# Patient Record
Sex: MALE | Race: WHITE | NOT HISPANIC OR LATINO | Employment: OTHER | URBAN - METROPOLITAN AREA
[De-identification: names, ages, dates, MRNs, and addresses within clinical notes are randomized per-mention and may not be internally consistent; named-entity substitution may affect disease eponyms.]

---

## 2020-06-19 ENCOUNTER — TELEPHONE (OUTPATIENT)
Dept: UROLOGY | Facility: MEDICAL CENTER | Age: 85
End: 2020-06-19

## 2020-07-17 ENCOUNTER — TELEPHONE (OUTPATIENT)
Dept: UROLOGY | Facility: AMBULATORY SURGERY CENTER | Age: 85
End: 2020-07-17

## 2020-07-17 NOTE — TELEPHONE ENCOUNTER
Called pt to confirm appointment/covid screening  Phone rang and then went silent  Unable to leave message

## 2020-07-20 ENCOUNTER — OFFICE VISIT (OUTPATIENT)
Dept: UROLOGY | Facility: HOSPITAL | Age: 85
End: 2020-07-20
Payer: COMMERCIAL

## 2020-07-20 ENCOUNTER — TELEPHONE (OUTPATIENT)
Dept: UROLOGY | Facility: HOSPITAL | Age: 85
End: 2020-07-20

## 2020-07-20 VITALS
HEIGHT: 69 IN | SYSTOLIC BLOOD PRESSURE: 132 MMHG | BODY MASS INDEX: 26.07 KG/M2 | WEIGHT: 176 LBS | DIASTOLIC BLOOD PRESSURE: 72 MMHG | TEMPERATURE: 97 F | HEART RATE: 59 BPM

## 2020-07-20 DIAGNOSIS — R31.0 GROSS HEMATURIA: Primary | ICD-10-CM

## 2020-07-20 PROCEDURE — 99204 OFFICE O/P NEW MOD 45 MIN: CPT | Performed by: UROLOGY

## 2020-07-20 NOTE — PROGRESS NOTES
Assessment/Plan:    No problem-specific Assessment & Plan notes found for this encounter  Diagnoses and all orders for this visit:    Gross hematuria  -     CT abdomen pelvis w wo contrast; Future    Other orders  -     betamethasone valerate (VALISONE) 0 1 % cream; Apply topically 2 (two) times a day  -     Multiple Vitamins-Minerals (ONE DAILY MULTIVITAMIN MEN PO); Take by mouth  -     Cholecalciferol (VITAMIN D3) 50 MCG (2000 UT) CHEW; Chew  -     Benzocaine (ORAL GEL ANESTHETIC MT); Apply to the mouth or throat  -     Carboxymeth-Glyc-Polysorb PF (Refresh Optive Akbar-3) 0 5-1-0 5 % SOLN; Apply to eye  -     hydrocortisone 2 5 % cream; Apply topically 4 (four) times a day as needed          Total visit time was 60 minutes of which over 50% was spent on counseling  Subjective:     Patient ID: Beena Willson is a 81 y o  male    81-year-old male presents for evaluation of gross hematuria  The patient states he had 2 episodes of painless gross hematuria  He states the last episode was approximately 3 weeks ago  He has not had any more hematuria since  He states that the episodes last between 1 and 3 days  He denies any clots  He states the beginning of the stream would be completely blood and then it would lighten towards the end  He may have had some mild dysuria but denies any significant discomfort or pain  He denies any history of smoking  He does have a family history of bladder cancer in his father who was also nonsmoker  He has no other complaints  The following portions of the patient's history were reviewed and updated as appropriate: allergies, current medications, past family history, past medical history, past social history, past surgical history and problem list     Review of Systems   Constitutional: Negative  HENT: Negative  Eyes: Negative  Respiratory: Negative  Cardiovascular: Negative  Gastrointestinal: Negative  Endocrine: Negative      Genitourinary: As noted per HPI   Musculoskeletal: Negative  Skin: Negative  Allergic/Immunologic: Negative  Neurological: Negative  Hematological: Negative  Psychiatric/Behavioral: Negative  AUA SYMPTOM SCORE      Most Recent Value   AUA SYMPTOM SCORE   How often have you had a sensation of not emptying your bladder completely after you finished urinating? 1   How often have you had to urinate again less than two hours after you finished urinating? 2   How often have you found you stopped and started again several times when you urinate? 2   How often have you found it difficult to postpone urination? 1   How often have you had a weak urinary stream?  0   How often have you had to push or strain to begin urination? 0   How many times did you most typically get up to urinate from the time you went to bed at night until the time you got up in the morning? 5   Quality of Life: If you were to spend the rest of your life with your urinary condition just the way it is now, how would you feel about that?  0   AUA SYMPTOM SCORE  11              Objective:    Physical Exam   Constitutional: He is oriented to person, place, and time  He appears well-developed and well-nourished  Neck: Normal range of motion  Cardiovascular: Intact distal pulses  Pulmonary/Chest: Effort normal    Abdominal: Soft  Bowel sounds are normal  He exhibits no distension and no mass  There is no tenderness  There is no rebound and no guarding  Genitourinary: Rectum normal    Genitourinary Comments: Prostate 50 g, smooth, no nodules, nontender  Musculoskeletal: Normal range of motion  Neurological: He is alert and oriented to person, place, and time  Skin: Skin is warm and dry  Psychiatric: He has a normal mood and affect  Vitals reviewed          Results  No results found for: PSA  No results found for: GLUCOSE, CALCIUM, NA, K, CO2, CL, BUN, CREATININE  No results found for: WBC, HGB, HCT, MCV, PLT    No results found for this or any previous visit (from the past 1 hour(s)) ]

## 2020-07-21 ENCOUNTER — APPOINTMENT (OUTPATIENT)
Dept: LAB | Facility: HOSPITAL | Age: 85
End: 2020-07-21
Payer: COMMERCIAL

## 2020-07-21 DIAGNOSIS — R31.0 GROSS HEMATURIA: ICD-10-CM

## 2020-07-21 LAB
ANION GAP SERPL CALCULATED.3IONS-SCNC: 6 MMOL/L (ref 4–13)
BUN SERPL-MCNC: 20 MG/DL (ref 5–25)
CALCIUM SERPL-MCNC: 9.5 MG/DL (ref 8.3–10.1)
CHLORIDE SERPL-SCNC: 103 MMOL/L (ref 100–108)
CO2 SERPL-SCNC: 30 MMOL/L (ref 21–32)
CREAT SERPL-MCNC: 1.07 MG/DL (ref 0.6–1.3)
GFR SERPL CREATININE-BSD FRML MDRD: 63 ML/MIN/1.73SQ M
GLUCOSE P FAST SERPL-MCNC: 95 MG/DL (ref 65–99)
POTASSIUM SERPL-SCNC: 4.2 MMOL/L (ref 3.5–5.3)
SODIUM SERPL-SCNC: 139 MMOL/L (ref 136–145)

## 2020-07-21 PROCEDURE — 36415 COLL VENOUS BLD VENIPUNCTURE: CPT

## 2020-07-21 PROCEDURE — 80048 BASIC METABOLIC PNL TOTAL CA: CPT

## 2020-07-24 ENCOUNTER — HOSPITAL ENCOUNTER (OUTPATIENT)
Dept: CT IMAGING | Facility: HOSPITAL | Age: 85
Discharge: HOME | End: 2020-07-24
Attending: UROLOGY
Payer: COMMERCIAL

## 2020-07-24 DIAGNOSIS — R31.0 GROSS HEMATURIA: ICD-10-CM

## 2020-07-24 PROCEDURE — 74178 CT ABD&PLV WO CNTR FLWD CNTR: CPT

## 2020-07-24 RX ADMIN — IOHEXOL 100 ML: 350 INJECTION, SOLUTION INTRAVENOUS at 13:56

## 2020-07-27 ENCOUNTER — TELEPHONE (OUTPATIENT)
Dept: UROLOGY | Facility: MEDICAL CENTER | Age: 85
End: 2020-07-27

## 2020-07-27 NOTE — TELEPHONE ENCOUNTER
Patient returned call, patient confirmed appt for 7/28/2020 at 10:00am  Also, patient is inquiring CT results    Patient can be reached at [phone]

## 2020-07-27 NOTE — TELEPHONE ENCOUNTER
Patient of Dr Michelle Harrison seen in [address] [place] [address] office  Radiology called to advise cat scan results are abnormal please review epic

## 2020-07-27 NOTE — TELEPHONE ENCOUNTER
Patient of Benson/Shashank  History of gross hematuria  Last seen 7/20/2020, ordered for CT and cysto (which is scheduled 8/28/2020)  Will route to provider to review CT

## 2020-07-27 NOTE — TELEPHONE ENCOUNTER
Call placed to patient, son Harish Mora answered  I did speak with him per communication consent form  Advised that patient had called for results, advised that they would be discussed at length during appointment tomorrow  Son verbalized understanding and agrees with plan

## 2020-07-27 NOTE — TELEPHONE ENCOUNTER
Call placed to patient, left message to call office back  Office number provided on voicemail  When patient returns call, will offer 7/28 10am at [facility]

## 2020-07-28 ENCOUNTER — PROCEDURE VISIT (OUTPATIENT)
Dept: UROLOGY | Facility: HOSPITAL | Age: 85
End: 2020-07-28
Payer: COMMERCIAL

## 2020-07-28 VITALS
DIASTOLIC BLOOD PRESSURE: 62 MMHG | BODY MASS INDEX: 26.07 KG/M2 | TEMPERATURE: 97 F | WEIGHT: 176 LBS | HEIGHT: 69 IN | SYSTOLIC BLOOD PRESSURE: 138 MMHG | HEART RATE: 87 BPM

## 2020-07-28 DIAGNOSIS — R31.0 GROSS HEMATURIA: Primary | ICD-10-CM

## 2020-07-28 DIAGNOSIS — N32.89 BLADDER MASS: ICD-10-CM

## 2020-07-28 LAB
SL AMB  POCT GLUCOSE, UA: NORMAL
SL AMB LEUKOCYTE ESTERASE,UA: NORMAL
SL AMB POCT BILIRUBIN,UA: NORMAL
SL AMB POCT BLOOD,UA: NORMAL
SL AMB POCT CLARITY,UA: CLEAR
SL AMB POCT COLOR,UA: YELLOW
SL AMB POCT KETONES,UA: NORMAL
SL AMB POCT NITRITE,UA: NORMAL
SL AMB POCT PH,UA: 6
SL AMB POCT SPECIFIC GRAVITY,UA: 1.02
SL AMB POCT URINE PROTEIN: NORMAL
SL AMB POCT UROBILINOGEN: 0.2

## 2020-07-28 PROCEDURE — 81002 URINALYSIS NONAUTO W/O SCOPE: CPT | Performed by: UROLOGY

## 2020-07-28 PROCEDURE — 99214 OFFICE O/P EST MOD 30 MIN: CPT | Performed by: UROLOGY

## 2020-07-28 RX ORDER — ASPIRIN 81 MG/1
TABLET, CHEWABLE ORAL
COMMUNITY
Start: 2012-09-19

## 2020-07-28 RX ORDER — CEFAZOLIN SODIUM 1 G/50ML
1000 SOLUTION INTRAVENOUS ONCE
Status: CANCELLED | OUTPATIENT
Start: 2020-07-28 | End: 2020-07-28

## 2020-07-28 RX ORDER — ACETAMINOPHEN 160 MG
TABLET,DISINTEGRATING ORAL DAILY
COMMUNITY
Start: 2019-10-11

## 2020-07-28 NOTE — ASSESSMENT & PLAN NOTE
I reviewed the CT results with the patient  Unfortunately there was a large bladder mass  We will need to proceed with transurethral resection of bladder tumor  We will hold off on office cystoscopy today  We discussed the possibility of left-sided ureteroscopy given the findings of possible left ureteral involvement  Risks and benefits were discussed and he was consented for the surgery  We will schedule this in the near future

## 2020-07-28 NOTE — PROGRESS NOTES
Assessment/Plan:    Bladder mass  I reviewed the CT results with the patient  Unfortunately there was a large bladder mass  We will need to proceed with transurethral resection of bladder tumor  We will hold off on office cystoscopy today  We discussed the possibility of left-sided ureteroscopy given the findings of possible left ureteral involvement  Risks and benefits were discussed and he was consented for the surgery  We will schedule this in the near future  Diagnoses and all orders for this visit:    Gross hematuria  -     POCT urine dip    Bladder mass  -     Case request operating room: TRANSURETHRAL RESECTION OF BLADDER TUMOR (TURBT), CYSTOSCOPY RETROGRADE PYELOGRAM WITH INSERTION STENT URETERAL, URETEROSCOPY; Standing  -     PAT Covid Screening; Future  -     Case request operating room: TRANSURETHRAL RESECTION OF BLADDER TUMOR (TURBT), CYSTOSCOPY RETROGRADE PYELOGRAM WITH INSERTION STENT URETERAL, URETEROSCOPY    Other orders  -     Cholecalciferol (VITAMIN D3) 50 MCG (2000 UT) capsule; Take by mouth Daily  -     Discontinue: Multiple Vitamin (MULTI VITAMIN DAILY PO); once a day  -     aspirin 81 mg chewable tablet; chew 1 tablet (81MG)  by oral route  every day  -     Diet NPO; Sips with meds; Standing  -     Place sequential compression device; Standing  -     ceFAZolin (ANCEF) IVPB (premix) 1,000 mg 50 mL          Total visit time was 25 minutes of which over 50% was spent on counseling  Subjective:     Patient ID: Liborio Tesfaye is a 81 y o  male    81-year-old male presents for follow-up of gross hematuria  He is here for cystoscopy and review of recent CT scan  He has no new complaints  The following portions of the patient's history were reviewed and updated as appropriate: allergies, current medications, past family history, past medical history, past social history, past surgical history and problem list     Review of Systems   Constitutional: Negative  HENT: Negative  Eyes: Negative  Respiratory: Negative  Cardiovascular: Negative  Gastrointestinal: Negative  Endocrine: Negative  Genitourinary:        As noted per HPI   Musculoskeletal: Negative  Skin: Negative  Allergic/Immunologic: Negative  Neurological: Negative  Hematological: Negative  Psychiatric/Behavioral: Negative  Objective:    Physical Exam   Constitutional: He is oriented to person, place, and time  He appears well-developed and well-nourished  Neck: Normal range of motion  Cardiovascular: Intact distal pulses  Pulmonary/Chest: Effort normal    Abdominal: Soft  Bowel sounds are normal  He exhibits no distension and no mass  There is no tenderness  There is no rebound and no guarding  Musculoskeletal: Normal range of motion  Neurological: He is alert and oriented to person, place, and time  Skin: Skin is warm and dry  Psychiatric: He has a normal mood and affect  Vitals reviewed          Results  No results found for: PSA  Lab Results   Component Value Date    CALCIUM 9 5 07/21/2020    K 4 2 07/21/2020    CO2 30 07/21/2020     07/21/2020    BUN 20 07/21/2020    CREATININE 1 07 07/21/2020     No results found for: WBC, HGB, HCT, MCV, PLT    Recent Results (from the past 1 hour(s))   POCT urine dip    Collection Time: 07/28/20 10:02 AM   Result Value Ref Range    LEUKOCYTE ESTERASE,UA trace     NITRITE,UA -     SL AMB POCT UROBILINOGEN 0 2     POCT URINE PROTEIN +      PH,UA 6 0     BLOOD,UA ++     SPECIFIC GRAVITY,UA 1 025     KETONES,UA -     BILIRUBIN,UA -     GLUCOSE, UA -      COLOR,UA yellow     CLARITY,UA clear    ]

## 2020-07-29 ENCOUNTER — TELEPHONE (OUTPATIENT)
Dept: UROLOGY | Facility: AMBULATORY SURGERY CENTER | Age: 85
End: 2020-07-29

## 2020-07-29 NOTE — TELEPHONE ENCOUNTER
Confirmed 9/14 @   [facility] [address] with Dr Sandra Mendoza for TURBT  Instructions and testing reviewed  Paperwork sent  POST OP made 10/2 @11:30  Patient has questions about his medication from Dr Sandra Mendoza and inquired about BCG  Please call to answer questions  [phone]  Thank you!

## 2020-07-29 NOTE — TELEPHONE ENCOUNTER
Called Toni Sharma back and went over the surgery and what BCG would consist of but that we don't know if he would receive BCG treatment  It all depends on result of biopsy, which takes about 2 weeks to get the results  He understood  He also had questions about his medications if he was to continue his Vit D3, aspirin and multivitiamin and or if he is to stop this medication prior to surgery   Please advise

## 2020-07-30 NOTE — TELEPHONE ENCOUNTER
Patient called and advised that he spoke with a friend that is a physician and he advised him that he should get the surgery sooner than 09/14/2020  Please advise

## 2020-07-30 NOTE — TELEPHONE ENCOUNTER
LMOM to inform Patient his surgery date is earliest available and was confirmed by Dr De La Cruz Has to acceptable time frame  Provided # for return call

## 2020-07-30 NOTE — TELEPHONE ENCOUNTER
Patient returned call  Advised patient of Matthieu Kramer message patient was upset and advised that he would like this taken care of earlier and would not mind going to another hospital  Please advise

## 2020-07-30 NOTE — TELEPHONE ENCOUNTER
Lm for Talon Elizabeth that he would need to stop aspirin and multivitamin prior to surgery   Also , that arturo had been trying to cantact chayo

## 2020-07-31 NOTE — TELEPHONE ENCOUNTER
Spoke to Patient and advised him that I spoke to Dr Sofia Martinez and he was comfortable with this timeframe for surgery  He was ok with it at this call and thanked me for looking into it  I did offer to speak to Dr Sofia Martinez and see if another Surgeon in the practice could do this, but Patient stated he would like to stay with Dr Sofia Martinez  I assured him that if I had any cancellations I would move his date up  By the end of the call Patient was satisfied with plan going forward  Reminded him to call the office if he had any change in symptoms or concerns

## 2020-08-06 ENCOUNTER — TELEPHONE (OUTPATIENT)
Dept: UROLOGY | Facility: AMBULATORY SURGERY CENTER | Age: 85
End: 2020-08-06

## 2020-08-06 ENCOUNTER — TELEPHONE (OUTPATIENT)
Dept: UROLOGY | Facility: MEDICAL CENTER | Age: 85
End: 2020-08-06

## 2020-08-06 NOTE — TELEPHONE ENCOUNTER
Patient returned phone call stated that he is passing blood clots and have bloody urine   I personally spoke to Dr Anay Keen as he advised patient to drink a lot of water to clear out bladder   If patient is unable to pee or becomes lighted or dizzy he should give our office a call or go to the ER   Patient stated he feels fine just bloody urine and wanted to make sure it was ok , will call us back if symptoms worsen

## 2020-08-06 NOTE — TELEPHONE ENCOUNTER
Patient of Dr Luisana Newman seen in [address] [place] [address]  Patient states he started to get clots and blood in his urine yesterday  Please advise    Patient would also like to know if it is safe to get the shingles vaccine since he is due for upcoming surgery  Please advise

## 2020-08-06 NOTE — TELEPHONE ENCOUNTER
Called patient to address symptoms   No answer left detailed message on machine   Instructed patient to call our office back

## 2020-08-17 ENCOUNTER — TELEPHONE (OUTPATIENT)
Dept: UROLOGY | Facility: MEDICAL CENTER | Age: 85
End: 2020-08-17

## 2020-08-17 NOTE — TELEPHONE ENCOUNTER
Patient of Toño Mcnair in the [place] office  Patient calling to inquire if he may have the shingles vaccines prior to surgery  Or if he should wait until post op    Please call at [phone]  Thank you

## 2020-08-17 NOTE — TELEPHONE ENCOUNTER
Call placed to patient, advised per provider to wait to have shingles vaccine after surgery  Patient verbalized understanding and agrees with plan

## 2020-08-17 NOTE — TELEPHONE ENCOUNTER
Patient is schedule for TUBT on 9/14 he is questioning if he can have shingles vaccine prior to surgery

## 2020-08-31 ENCOUNTER — APPOINTMENT (OUTPATIENT)
Dept: LAB | Facility: HOSPITAL | Age: 85
End: 2020-08-31
Attending: UROLOGY
Payer: COMMERCIAL

## 2020-08-31 ENCOUNTER — TELEPHONE (OUTPATIENT)
Dept: UROLOGY | Facility: HOSPITAL | Age: 85
End: 2020-08-31

## 2020-08-31 ENCOUNTER — HOSPITAL ENCOUNTER (OUTPATIENT)
Dept: NON INVASIVE DIAGNOSTICS | Facility: HOSPITAL | Age: 85
Discharge: HOME | End: 2020-08-31
Attending: UROLOGY
Payer: COMMERCIAL

## 2020-08-31 DIAGNOSIS — N32.89 BLADDER MASS: ICD-10-CM

## 2020-08-31 LAB
ATRIAL RATE: 58 BPM
P AXIS: 19 DEGREES
PR INTERVAL: 176 MS
QRS AXIS: -43 DEGREES
QRSD INTERVAL: 122 MS
QT INTERVAL: 440 MS
QTC INTERVAL: 431 MS
T WAVE AXIS: -19 DEGREES
VENTRICULAR RATE: 58 BPM

## 2020-08-31 PROCEDURE — 93005 ELECTROCARDIOGRAM TRACING: CPT

## 2020-08-31 PROCEDURE — 87086 URINE CULTURE/COLONY COUNT: CPT

## 2020-08-31 PROCEDURE — 93010 ELECTROCARDIOGRAM REPORT: CPT | Performed by: INTERNAL MEDICINE

## 2020-09-01 LAB — BACTERIA UR CULT: NORMAL

## 2020-09-04 ENCOUNTER — ANESTHESIA EVENT (OUTPATIENT)
Dept: PERIOP | Facility: HOSPITAL | Age: 85
End: 2020-09-04
Payer: COMMERCIAL

## 2020-09-04 NOTE — PRE-PROCEDURE INSTRUCTIONS
Pre-Surgery Instructions:   Medication Instructions    Benzocaine (ORAL GEL ANESTHETIC MT) Instructed patient per Anesthesia Guidelines   Carboxymeth-Glyc-Polysorb PF (Refresh Optive Akbar-3) 0 5-1-0 5 % SOLN Instructed patient per Anesthesia Guidelines   Cholecalciferol (VITAMIN D3) 50 MCG (2000 UT) capsule Instructed patient per Anesthesia Guidelines   Multiple Vitamins-Minerals (ONE DAILY MULTIVITAMIN MEN PO) Instructed patient per Anesthesia Guidelines  Pre-op Showering Instructions for Surgery Patients    Before your operation, you play an important role in decreasing your risk for infection by washing with special antiseptic soap  This is an effective way to reduce bacteria on the skin which may help to prevent infections at the surgical site  Please read the following directions in advance  1  In the week before your operation, purchase a 4 ounce bottle of antiseptic soap containing chlorhexidine gluconate (CHG)  4%  Some brand names include: Aplicare®, Endure, and Hibiclens®  The cost is usually less than $5 00   For your convenience, the [employer] carries the soap   It may also be available at your doctors office or pre-admission testing center, and at most retail pharmacies   If you are allergic or sensitive to soaps containing CHG, please let your doctor know so another antiseptic can be suggested   CHG antiseptic soap is for external use only  2   The day before your operation, follow these instructions carefully to get ready   Please clean linens (sheets) on your bed; you should sleep on clean sheets after your evening shower   Get clean towels and washcloth ready - you need enough for 2 showers   Set aside clean underwear, pajamas, and clothing to wear after the showers     Reminders:   DO NOT use any other soap or body rinse on your skin during or after the antiseptic showers   DO NOT use lotion, powder, deodorant, or perfume/aftershave of any kind on your skin after your antiseptic shower   DO NOT shave any body parts in the 24 hours/day before your operation   DO NOT get the antiseptic soap in your eyes, ears, nose, mouth, or vaginal area    3  You will need to shower the night before AND the morning of your surgery  Shower 1:   The first evening before the operation, take the first shower   First, shampoo your hair with regular shampoo and rinse it completely before you use the antiseptic soap  After washing and rinsing your hair, rinse your body   Next, use a clean washcloth to apply the antiseptic soap and wash your body from the neck down to your toes using ½ bottle of the antiseptic soap  You will use the other ½ bottle for the second shower   Clean the area where your incision will be; lather this area well for about 2 minutes   If you are having head or neck surgery, wash areas with the antiseptic soap   Rinse yourself completely with running water   Use a clean towel to dry off   Wear clean underwear and clothing/pajamas  Shower 2   The morning of your operation, take the second shower following the same steps as Shower 1 using the second ½ of the bottle of antiseptic soap   Use clean cloths and towels to wash and dry yourself   Wear clean underwear and clothing  You will receive a phone call from hospital for arrival time  Please call surgeons office if any changes in your condition  Wear easy on/off clothing; consider type of surgery;  valuables and jewelry please keep at home  **COVID-19  education done  Please: No contacts or eye make up or artificial eyelashes    Please bring special ordered sling or braces if needed for  Your particular surgery  n/a    Please secure transportation     Follow pre surgery showering or cleaning instructions as  Reviewed by nurse or surgeons office   Patient will use an antibacterial soap Dial      Questions answered and concerns addressed

## 2020-09-14 ENCOUNTER — ANESTHESIA (OUTPATIENT)
Dept: PERIOP | Facility: HOSPITAL | Age: 85
End: 2020-09-14
Payer: COMMERCIAL

## 2020-09-14 ENCOUNTER — HOSPITAL ENCOUNTER (OUTPATIENT)
Facility: HOSPITAL | Age: 85
Setting detail: OUTPATIENT SURGERY
Discharge: HOME | End: 2020-09-14
Attending: UROLOGY | Admitting: UROLOGY
Payer: COMMERCIAL

## 2020-09-14 ENCOUNTER — APPOINTMENT (OUTPATIENT)
Dept: RADIOLOGY | Facility: HOSPITAL | Age: 85
End: 2020-09-14
Payer: COMMERCIAL

## 2020-09-14 VITALS
OXYGEN SATURATION: 94 % | HEIGHT: 70 IN | SYSTOLIC BLOOD PRESSURE: 147 MMHG | BODY MASS INDEX: 24.71 KG/M2 | WEIGHT: 172.6 LBS | RESPIRATION RATE: 18 BRPM | TEMPERATURE: 98.6 F | DIASTOLIC BLOOD PRESSURE: 66 MMHG | HEART RATE: 56 BPM

## 2020-09-14 VITALS — HEART RATE: 69 BPM

## 2020-09-14 DIAGNOSIS — N32.89 BLADDER MASS: Primary | ICD-10-CM

## 2020-09-14 PROCEDURE — 88363 XM ARCHIVE TISSUE MOLEC ANAL: CPT | Performed by: PATHOLOGY

## 2020-09-14 PROCEDURE — NC001 PR NO CHARGE: Performed by: UROLOGY

## 2020-09-14 PROCEDURE — 88307 TISSUE EXAM BY PATHOLOGIST: CPT | Performed by: PATHOLOGY

## 2020-09-14 PROCEDURE — 52240 CYSTOSCOPY AND TREATMENT: CPT | Performed by: UROLOGY

## 2020-09-14 RX ORDER — PROPOFOL 10 MG/ML
INJECTION, EMULSION INTRAVENOUS AS NEEDED
Status: DISCONTINUED | OUTPATIENT
Start: 2020-09-14 | End: 2020-09-14

## 2020-09-14 RX ORDER — DEXAMETHASONE SODIUM PHOSPHATE 10 MG/ML
INJECTION, SOLUTION INTRAMUSCULAR; INTRAVENOUS AS NEEDED
Status: DISCONTINUED | OUTPATIENT
Start: 2020-09-14 | End: 2020-09-14

## 2020-09-14 RX ORDER — GLYCINE 1.5 G/100ML
SOLUTION IRRIGATION AS NEEDED
Status: DISCONTINUED | OUTPATIENT
Start: 2020-09-14 | End: 2020-09-14 | Stop reason: HOSPADM

## 2020-09-14 RX ORDER — MAGNESIUM HYDROXIDE 1200 MG/15ML
LIQUID ORAL AS NEEDED
Status: DISCONTINUED | OUTPATIENT
Start: 2020-09-14 | End: 2020-09-14 | Stop reason: HOSPADM

## 2020-09-14 RX ORDER — FENTANYL CITRATE 50 UG/ML
INJECTION, SOLUTION INTRAMUSCULAR; INTRAVENOUS AS NEEDED
Status: DISCONTINUED | OUTPATIENT
Start: 2020-09-14 | End: 2020-09-14

## 2020-09-14 RX ORDER — FENTANYL CITRATE/PF 50 MCG/ML
25 SYRINGE (ML) INJECTION
Status: DISCONTINUED | OUTPATIENT
Start: 2020-09-14 | End: 2020-09-14 | Stop reason: HOSPADM

## 2020-09-14 RX ORDER — PHENAZOPYRIDINE HYDROCHLORIDE 100 MG/1
100 TABLET, FILM COATED ORAL 3 TIMES DAILY PRN
Qty: 20 TABLET | Refills: 0 | Status: SHIPPED | OUTPATIENT
Start: 2020-09-14

## 2020-09-14 RX ORDER — CEFAZOLIN SODIUM 1 G/50ML
1000 SOLUTION INTRAVENOUS ONCE
Status: COMPLETED | OUTPATIENT
Start: 2020-09-14 | End: 2020-09-14

## 2020-09-14 RX ORDER — TRAMADOL HYDROCHLORIDE 50 MG/1
50 TABLET ORAL EVERY 6 HOURS PRN
Status: DISCONTINUED | OUTPATIENT
Start: 2020-09-14 | End: 2020-09-14 | Stop reason: HOSPADM

## 2020-09-14 RX ORDER — PROPOFOL 10 MG/ML
INJECTION, EMULSION INTRAVENOUS CONTINUOUS PRN
Status: DISCONTINUED | OUTPATIENT
Start: 2020-09-14 | End: 2020-09-14

## 2020-09-14 RX ORDER — HYDROMORPHONE HCL/PF 1 MG/ML
0.5 SYRINGE (ML) INJECTION
Status: DISCONTINUED | OUTPATIENT
Start: 2020-09-14 | End: 2020-09-14 | Stop reason: HOSPADM

## 2020-09-14 RX ORDER — SODIUM CHLORIDE, SODIUM LACTATE, POTASSIUM CHLORIDE, CALCIUM CHLORIDE 600; 310; 30; 20 MG/100ML; MG/100ML; MG/100ML; MG/100ML
75 INJECTION, SOLUTION INTRAVENOUS CONTINUOUS
Status: DISCONTINUED | OUTPATIENT
Start: 2020-09-14 | End: 2020-09-14 | Stop reason: HOSPADM

## 2020-09-14 RX ORDER — NEOSTIGMINE METHYLSULFATE 1 MG/ML
INJECTION INTRAVENOUS AS NEEDED
Status: DISCONTINUED | OUTPATIENT
Start: 2020-09-14 | End: 2020-09-14

## 2020-09-14 RX ORDER — TRAMADOL HYDROCHLORIDE 50 MG/1
50 TABLET ORAL EVERY 6 HOURS PRN
Qty: 20 TABLET | Refills: 0 | Status: SHIPPED | OUTPATIENT
Start: 2020-09-14 | End: 2020-09-24

## 2020-09-14 RX ORDER — GLYCOPYRROLATE 0.2 MG/ML
INJECTION INTRAMUSCULAR; INTRAVENOUS AS NEEDED
Status: DISCONTINUED | OUTPATIENT
Start: 2020-09-14 | End: 2020-09-14

## 2020-09-14 RX ORDER — ROCURONIUM BROMIDE 10 MG/ML
INJECTION, SOLUTION INTRAVENOUS AS NEEDED
Status: DISCONTINUED | OUTPATIENT
Start: 2020-09-14 | End: 2020-09-14

## 2020-09-14 RX ORDER — CEPHALEXIN 500 MG/1
500 CAPSULE ORAL EVERY 8 HOURS SCHEDULED
Qty: 9 CAPSULE | Refills: 0 | Status: SHIPPED | OUTPATIENT
Start: 2020-09-14 | End: 2020-09-14 | Stop reason: SDUPTHER

## 2020-09-14 RX ORDER — HYDRALAZINE HYDROCHLORIDE 20 MG/ML
5 INJECTION INTRAMUSCULAR; INTRAVENOUS ONCE
Status: COMPLETED | OUTPATIENT
Start: 2020-09-14 | End: 2020-09-14

## 2020-09-14 RX ORDER — CEPHALEXIN 500 MG/1
500 CAPSULE ORAL EVERY 8 HOURS SCHEDULED
Qty: 9 CAPSULE | Refills: 0 | Status: SHIPPED | OUTPATIENT
Start: 2020-09-14 | End: 2020-09-17

## 2020-09-14 RX ORDER — ONDANSETRON 2 MG/ML
INJECTION INTRAMUSCULAR; INTRAVENOUS AS NEEDED
Status: DISCONTINUED | OUTPATIENT
Start: 2020-09-14 | End: 2020-09-14

## 2020-09-14 RX ADMIN — ONDANSETRON 4 MG: 2 INJECTION INTRAMUSCULAR; INTRAVENOUS at 08:04

## 2020-09-14 RX ADMIN — NEOSTIGMINE METHYLSULFATE 3 MG: 1 INJECTION, SOLUTION INTRAVENOUS at 08:50

## 2020-09-14 RX ADMIN — FENTANYL CITRATE 100 MCG: 50 INJECTION, SOLUTION INTRAMUSCULAR; INTRAVENOUS at 08:12

## 2020-09-14 RX ADMIN — CEFAZOLIN SODIUM 1000 MG: 1 SOLUTION INTRAVENOUS at 07:35

## 2020-09-14 RX ADMIN — FENTANYL CITRATE 25 MCG: 50 INJECTION, SOLUTION INTRAMUSCULAR; INTRAVENOUS at 09:19

## 2020-09-14 RX ADMIN — GLYCOPYRROLATE 0.4 MG: 0.2 INJECTION, SOLUTION INTRAMUSCULAR; INTRAVENOUS at 08:50

## 2020-09-14 RX ADMIN — ROCURONIUM BROMIDE 35 MG: 10 INJECTION, SOLUTION INTRAVENOUS at 07:42

## 2020-09-14 RX ADMIN — FENTANYL CITRATE 50 MCG: 50 INJECTION, SOLUTION INTRAMUSCULAR; INTRAVENOUS at 07:42

## 2020-09-14 RX ADMIN — HYDRALAZINE HYDROCHLORIDE 5 MG: 20 INJECTION INTRAMUSCULAR; INTRAVENOUS at 10:06

## 2020-09-14 RX ADMIN — SODIUM CHLORIDE, SODIUM LACTATE, POTASSIUM CHLORIDE, AND CALCIUM CHLORIDE 75 ML/HR: .6; .31; .03; .02 INJECTION, SOLUTION INTRAVENOUS at 07:09

## 2020-09-14 RX ADMIN — PROPOFOL 100 MG: 10 INJECTION, EMULSION INTRAVENOUS at 07:42

## 2020-09-14 RX ADMIN — FENTANYL CITRATE 50 MCG: 50 INJECTION, SOLUTION INTRAMUSCULAR; INTRAVENOUS at 07:35

## 2020-09-14 RX ADMIN — PROPOFOL 75 MCG/KG/MIN: 10 INJECTION, EMULSION INTRAVENOUS at 07:42

## 2020-09-14 RX ADMIN — DEXAMETHASONE SODIUM PHOSPHATE 4 MG: 10 INJECTION, SOLUTION INTRAMUSCULAR; INTRAVENOUS at 08:04

## 2020-09-14 NOTE — DISCHARGE INSTRUCTIONS
CYSTOSCOPY, TURBT, PROSTATE BIOPSY, BLADDER BIOPSY   DISCHARGE INSTRUCTIONS    Care after your surgery:  1  You may have burning or red colored urine the first 24 hours  Your burning should  stop in 24 hours and your urine should clear in 24-48 hours  2  You should drink more fluids unless Dr Adry Hwang advises you not to    3  You should return to normal activities when your urine is clear again  4  You may have a small amount of red drainage from your rectum if you had a prostate  biopsy performed  This should stop in 24 hours  5  Take pain medication as prescribed by your doctor  Call Dr Adry Hwang if you have any of the followin  You can not urinate or you have active or persistent bleeding, clots, back pain, or  pain when you urinate  2  You have chills, fever above 101 degrees, or feel sick  3  You have persistent nausea or vomiting, persistent dizziness, or lightheadedness  4  Call Dr Adry Hwang if you have any questions or concerns about your procedure at:  [phone]      Follow-up with Dr Adry Hwang in 1-2 weeks  Hold your aspirin for 1 week  The office will contact you to schedule Spangler removal on Friday  Spangler Catheter Placement and Care   WHAT YOU NEED TO KNOW:   A Spangler catheter is a sterile tube that is inserted into your bladder to drain urine  It is also called an indwelling urinary catheter  The tip of the catheter has a small balloon filled with solution that holds the catheter inside your bladder  DISCHARGE INSTRUCTIONS:   Return to the emergency department if:   · Your catheter comes out  · You suddenly have material that looks like sand in the tubing or drainage bag  · No urine is draining into the bag and you have checked the system  · You have pain in your hip, back, pelvis, or lower abdomen  · You are confused or cannot think clearly  Contact your healthcare provider if:   · You have a fever      · You have bladder spasms for more than 1 day after the catheter is placed  · You see blood in the tubing or drainage bag  · You have a rash or itching where the catheter tube is secured to your skin  · Urine leaks from or around the catheter, tubing, or drainage bag  · The closed drainage system has accidently come open or apart  · You see a layer of crystals inside the tubing  · You have questions or concerns about your condition or care  Care for your Spangler catheter:   · Clean your genital area 2 times every day  Clean your catheter and the area around where it was inserted  Use soap and water  Clean your anal opening and catheter area after every bowel movement  · Secure the catheter tube  so you do not pull or move the catheter  This helps prevent pain and bladder spasms  Healthcare providers will show you how to use medical tape or a strap to secure the catheter tube to your body  · Keep a closed drainage system  Your Spangler catheter should always be attached to the drainage bag to form a closed system  Do not disconnect any part of the closed system unless you need to change the bag  Care for your drainage bag:   · Ask if a leg bag is right for you  A leg bag can be worn under your clothes  Ask your healthcare provider for more information about a leg bag  · Keep the drainage bag below the level of your waist   This helps stop urine from moving back up the tubing and into your bladder  Do not loop or kink the tubing  This can cause urine to back up and collect in your bladder  Do not let the drainage bag touch or lie on the floor  · Empty the drainage bag when needed  The weight of a full drainage bag can be painful  Empty the drainage bag every 3 to 6 hours or when it is ? full  · Clean and change the drainage bag as directed  Ask your healthcare provider how often you should change the drainage bag and what cleaning solution to use  Wear disposable gloves when you change the bag   Do not allow the end of the catheter or tubing to touch anything  Clean the ends with an alcohol pad before you reconnect them  What to do if problems develop:   · No urine is draining into the bag:      ¨ Check for kinks in the tubing and straighten them out  ¨ Check the tape or strap used to secure the catheter tube to your skin  Make sure it is not blocking the tube  ¨ Make sure you are not sitting or lying on the tubing  ¨ Make sure the urine bag is hanging below the level of your waist     · Urine leaks from or around the catheter, tubing, or drainage bag:  Check if the closed drainage system has accidently come open or apart  Clean the catheter and tubing ends with a new alcohol pad and reconnect them  Prevent an infection:   · Wash your hands often  Wash before and after you touch your catheter, tubing, or drainage bag  Use soap and water  Wear clean disposable gloves when you care for your catheter or disconnect the drainage bag  Wash your hands before you prepare or eat food  · Drink liquids as directed  Ask your healthcare provider how much liquid to drink each day and which liquids are best for you  Liquids will help flush your kidneys and bladder to help prevent infection  Follow up with your healthcare provider as directed:  Write down your questions so you remember to ask them during your visits  © 2017 [address] [facility] Information is for End User's use only and may not be sold, redistributed or otherwise used for commercial purposes  All illustrations and images included in CareNotes® are the copyrighted property of A D [employer] M [employer]  or Rahat Gandhi  The above information is an  only  It is not intended as medical advice for individual conditions or treatments  Talk to your doctor, nurse or pharmacist before following any medical regimen to see if it is safe and effective for you  Urinary Leg Bag   WHAT YOU NEED TO KNOW:   What is a urinary leg bag?   A urinary leg bag holds urine that drains from your catheter  It fits under your clothes and allows you to do your normal daily activities  How do I use a urinary leg bag? · Wash your hands  before and after you touch your catheter, tubing, or drainage bag  Use soap and water  This reduces the risk of infection  · Strap your leg bag to your thigh or calf  Make sure the straps are comfortable  The straps can cause problems with blood flow in your leg if they are too tight  · Clean the tip of the drainage tube with alcohol  before attaching it to your catheter  This helps prevent bacteria from getting into your catheter  · The connecting tube should not pull on your catheter  Skin breakdown can occur if there is constant pulling on the catheter  · Check the tube often to make sure it is not kinked or twisted  Blockage in the tube can cause urine to back up into your bladder  Your urine must flow straight through the tube into your leg bag  · Always keep the leg bag below your bladder  This prevents urine from the bag going back into your bladder, which may cause an infection  · Empty your leg bag when it is ½ full, or every 3 hours  A full bag may break or disconnect from the catheter  · Change to your bedside bag before you go to bed  Your bedside bag can hold more urine  Do not use your leg bag at night because it could become too full or break  · Clean your leg bag after every use  Fill the bag with 2 parts vinegar and 3 parts water  Let it soak for 20 minutes, then rinse and let dry  Follow your healthcare provider's instruction on replacing your leg bag with a new one  CARE AGREEMENT:   You have the right to help plan your care  Learn about your health condition and how it may be treated  Discuss treatment options with your caregivers to decide what care you want to receive  You always have the right to refuse treatment  The above information is an  only   It is not intended as medical advice for individual conditions or treatments  Talk to your doctor, nurse or pharmacist before following any medical regimen to see if it is safe and effective for you  © 2017 [address] [employer] Information is for End User's use only and may not be sold, redistributed or otherwise used for commercial purposes  All illustrations and images included in CareNotes® are the copyrighted property of A D A M [employer]  or Rahat Gandhi

## 2020-09-14 NOTE — OP NOTE
OPERATIVE REPORT  PATIENT NAME: Sadia Marquez    :  1935  MRN: [id number]  Pt Location: [facility] OR ROOM 01    SURGERY DATE: 2020    Surgeon(s) and Role:     Shanon Dillard MD - Primary    Preop Diagnosis:  Bladder mass [N32 89]    Post-Op Diagnosis Codes: * Bladder mass [N32 89]    Procedure(s) (LRB):  TRANSURETHRAL RESECTION OF BLADDER TUMOR (TURBT) (N/A) greater than 5 cm    Specimen(s):  ID Type Source Tests Collected by Time Destination   1 : Bladder tumor Tissue Urinary Bladder TISSUE EXAM Imtiaz Whitman MD 2020 [id number]    2 : Deep Bladder biopsy Tissue Urinary Bladder TISSUE EXAM Imtiaz Whitman MD 2020 [id number]        Estimated Blood Loss:   Minimal    Drains:  Urethral Catheter Non-latex 18 Fr  (Active)   Number of days: 0       Anesthesia Type:   General    Operative Indications:  Bladder mass [N32 89]      Operative Findings:  Large 5 5 cm infiltrative necrotic mass noted along the left lateral wall of the bladder  Large necrotic, infiltrative bladder mass noted along the left lateral wall  Left ureteral orifice was uninvolved  Complications:   None    Procedure and Technique:  After informed consent was obtained the patient was brought to the operating room  Preoperative antibiotics were given for infection prophylaxis  Bilateral sequential compressive devices placed on lower extremities for DVT prophylaxis  A time-out was performed to identify the patient, surgery to be performed, and correct laterality  The patient was placed under general anesthesia prepped and draped in standard sterile fashion in dorsal lithotomy position  A 22 [place] rigid cystoscope was inserted per urethra into the bladder and the bladder was inspected with both 30 and 70 degree lenses  A large mass was noted along the left lateral wall just above the left ureteral orifice  The mass was infiltrative and necrotic  It was not very papillary    I then placed a guidewire up the left ureteral orifice to make sure we had identified it correctly and that it was uninvolved with the tumor  It was  The cystoscope was then removed and a 26 [employer] dual flow resectoscope was inserted  The tumor was then resected with loop electrocautery  The bladder tumor chips were then evacuated with an [employer] evacuator and sent off as a specimen  We then took deep biopsies of the resection bed and sent that as a separate specimen  Using electrocautery the resection site was fulgurated and good hemostasis was obtained  The bladder was inspected under low volumes and no bleeding was noted  The resectoscope was then removed and a 18 [place] Spangler catheter was placed  The patient was then woken and taken to the postanesthesia care unit stable condition     I was present for the entire procedure    Patient Disposition:  PACU     SIGNATURE: Francisco Garcia MD  DATE: September 14, 2020  TIME: 8:50 AM

## 2020-09-14 NOTE — ANESTHESIA POSTPROCEDURE EVALUATION
Post-Op Assessment Note    CV Status:  Stable       Mental Status:  Combative and confused   Hydration Status:  Stable   PONV Controlled:  None      Post Op Vitals Reviewed: Yes      Staff: CRNA   Comments: O2 satb95%, hr 92        No complications documented      BP     Temp      Pulse     Resp      SpO2

## 2020-09-14 NOTE — ANESTHESIA PREPROCEDURE EVALUATION
Procedure:  TRANSURETHRAL RESECTION OF BLADDER TUMOR (TURBT) (N/A Bladder)  CYSTOSCOPY RETROGRADE PYELOGRAM WITH INSERTION STENT URETERAL (Left Bladder)  URETEROSCOPY (Left Ureter)    Relevant Problems   ANESTHESIA (within normal limits)      CARDIO (within normal limits)      ENDO (within normal limits)      GI/HEPATIC (within normal limits)      /RENAL (within normal limits)  hematuria      GYN (within normal limits)      HEMATOLOGY (within normal limits)      MUSCULOSKELETAL (within normal limits)      NEURO/PSYCH (within normal limits)      PULMONARY (within normal limits)        Physical Exam    Airway    Mallampati score: II  TM Distance: >3 FB  Neck ROM: full     Dental   No notable dental hx     Cardiovascular  Rhythm: regular, Rate: normal, Cardiovascular exam normal    Pulmonary  Pulmonary exam normal Breath sounds clear to auscultation,     Other Findings        Anesthesia Plan  ASA Score- 2     Anesthesia Type- general with ASA Monitors  Additional Monitors:   Airway Plan: LMA  Comment: Benefits and risks of planned anesthetic discussed with patient, and agrees to proceed  I, Dr Parmjit Mena, the attending anesthesiologist, have personally seen and evaluated the patient prior to anesthetic care  I have reviewed the preanesthetic record, and other medical records if appropriate to the anesthetic care  If a CRNA is involved in the case, I have reviewed the CRNA assessment, if present, and agree  The patient is in a suitable condition to proceed with my formulated anesthetic plan          Plan Factors-    Chart reviewed  EKG reviewed  Induction- intravenous  Postoperative Plan- Plan for postoperative opioid use  Informed Consent- Anesthetic plan and risks discussed with patient  I personally reviewed this patient with the CRNA  Discussed and agreed on the Anesthesia Plan with the CRNA  Markus Martel

## 2020-09-14 NOTE — H&P
Patient seen and examined  No changes in health or examination since his last office visit that is copied below  Plan for today is to proceed with TURBT  His vital signs today are:  BP (!) 200/86   Pulse 77   Temp 98 °F (36 7 °C) (Temporal)   Resp 20   Ht 5' 10" (1 778 m)   Wt 78 3 kg (172 lb 9 6 oz)   SpO2 99%   BMI 24 77 kg/m²   Lungs are could auscultation bilaterally  Heart is regular rate and rhythm  Progress Notes     Assessment/Plan:     Bladder mass  I reviewed the CT results with the patient  Unfortunately there was a large bladder mass  We will need to proceed with transurethral resection of bladder tumor  We will hold off on office cystoscopy today  We discussed the possibility of left-sided ureteroscopy given the findings of possible left ureteral involvement  Risks and benefits were discussed and he was consented for the surgery  We will schedule this in the near future         Diagnoses and all orders for this visit:     Gross hematuria  -     POCT urine dip     Bladder mass  -     Case request operating room: TRANSURETHRAL RESECTION OF BLADDER TUMOR (TURBT), CYSTOSCOPY RETROGRADE PYELOGRAM WITH INSERTION STENT URETERAL, URETEROSCOPY; Standing  -     PAT Covid Screening; Future  -     Case request operating room: TRANSURETHRAL RESECTION OF BLADDER TUMOR (TURBT), CYSTOSCOPY RETROGRADE PYELOGRAM WITH INSERTION STENT URETERAL, URETEROSCOPY     Other orders  -     Cholecalciferol (VITAMIN D3) 50 MCG (2000 UT) capsule; Take by mouth Daily  -     Discontinue: Multiple Vitamin (MULTI VITAMIN DAILY PO); once a day  -     aspirin 81 mg chewable tablet; chew 1 tablet (81MG)  by oral route  every day  -     Diet NPO; Sips with meds; Standing  -     Place sequential compression device; Standing  -     ceFAZolin (ANCEF) IVPB (premix) 1,000 mg 50 mL            Total visit time was 25 minutes of which over 50% was spent on counseling      Subjective:      Patient ID: Gabrielle Peralta is a 81 y  o  male     52-year-old male presents for follow-up of gross hematuria  He is here for cystoscopy and review of recent CT scan  He has no new complaints            The following portions of the patient's history were reviewed and updated as appropriate: allergies, current medications, past family history, past medical history, past social history, past surgical history and problem list      Review of Systems   Constitutional: Negative  HENT: Negative  Eyes: Negative  Respiratory: Negative  Cardiovascular: Negative  Gastrointestinal: Negative  Endocrine: Negative  Genitourinary:        As noted per HPI   Musculoskeletal: Negative  Skin: Negative  Allergic/Immunologic: Negative  Neurological: Negative  Hematological: Negative  Psychiatric/Behavioral: Negative                       Objective:     Physical Exam   Constitutional: He is oriented to person, place, and time  He appears well-developed and well-nourished  Neck: Normal range of motion  Cardiovascular: Intact distal pulses  Pulmonary/Chest: Effort normal    Abdominal: Soft  Bowel sounds are normal  He exhibits no distension and no mass  There is no tenderness  There is no rebound and no guarding  Musculoskeletal: Normal range of motion  Neurological: He is alert and oriented to person, place, and time  Skin: Skin is warm and dry  Psychiatric: He has a normal mood and affect     Vitals reviewed            Results  No results found for: PSA    Lab Results  Component Value Date    CALCIUM 9 5 07/21/2020    K 4 2 07/21/2020    CO2 30 07/21/2020     07/21/2020    BUN 20 07/21/2020    CREATININE 1 07 07/21/2020    No results found for: WBC, HGB, HCT, MCV, PLT

## 2020-09-15 ENCOUNTER — TELEPHONE (OUTPATIENT)
Dept: UROLOGY | Facility: AMBULATORY SURGERY CENTER | Age: 85
End: 2020-09-15

## 2020-09-15 NOTE — TELEPHONE ENCOUNTER
Post Op Note    Beena Willson is a 81 y o  male s/p TURBT performed 09/14/2020  Beena Willson is a patient of Dr Gwyn Pack and is seen at the [facility]  How would you rate your pain on a scale from 1 to 10, 10 being the worst pain ever? 0  Have you had a fever? No  Have your bowel movements been regular? No just starting to eat granola bars    If the patient has a iniguez- are you comfortable caring for your iniguez? No Is it draining urine? Yes urine is clearing up - an orangish color  Do you have any other questions or concerns that I can address at this time?  Patient has no problems coming to [place] office

## 2020-09-18 ENCOUNTER — PROCEDURE VISIT (OUTPATIENT)
Dept: UROLOGY | Facility: AMBULATORY SURGERY CENTER | Age: 85
End: 2020-09-18
Payer: COMMERCIAL

## 2020-09-18 VITALS
TEMPERATURE: 96.9 F | HEIGHT: 70 IN | BODY MASS INDEX: 24.77 KG/M2 | HEART RATE: 90 BPM | DIASTOLIC BLOOD PRESSURE: 68 MMHG | SYSTOLIC BLOOD PRESSURE: 130 MMHG

## 2020-09-18 DIAGNOSIS — Z46.6 ENCOUNTER FOR FOLEY CATHETER REMOVAL: Primary | ICD-10-CM

## 2020-09-18 PROCEDURE — 99211 OFF/OP EST MAY X REQ PHY/QHP: CPT

## 2020-09-18 NOTE — PROGRESS NOTES
9/18/2020    Beena Willson is a 81 y o  male  [id number]    Diagnosis:  Chief Complaint     Post-op          Patient presents for iniguez removal s/p TRANSURETHRAL RESECTION OF BLADDER TUMOR (TURBT) (N/A Bladder)  on 09/14/2020 with Dr Luis Alberto Kaiser:  · Patient to follow up as scheduled 10/19/2020 in [facility] with Dr Gwyn Pack for post op appointment  · Patient to call office with questions or concerns in the interim  Procedure:  Patient arrived to office in wheelchair due to "weak knees/trouble walking"  Two person assist to procedure table  Iniguze catheter removed after deflation of an intact balloon  Patient tolerated well  Encouraged patient to hydrate well with water and avoid bladder irritants and constipation  He knows he may return early if uncomfortable and unable to urinate  Patient agrees to this plan  Knows to call office/go to ER with fever, chills, nausea, vomiting, or difficulty/inabilty to urinate      Vitals:    09/18/20 0907   BP: 130/68   BP Location: Left arm   Patient Position: Sitting   Cuff Size: Adult   Pulse: 90   Temp: (!) 96 9 °F (36 1 °C)   Height: 5' 10" (1 778 m)         Kitty Lauren, RN, BSN

## 2020-10-13 ENCOUNTER — PATIENT OUTREACH (OUTPATIENT)
Dept: UROLOGY | Facility: CLINIC | Age: 85
End: 2020-10-13

## 2020-10-13 ENCOUNTER — OFFICE VISIT (OUTPATIENT)
Dept: UROLOGY | Facility: HOSPITAL | Age: 85
End: 2020-10-13
Payer: COMMERCIAL

## 2020-10-13 VITALS
WEIGHT: 174 LBS | TEMPERATURE: 97 F | DIASTOLIC BLOOD PRESSURE: 82 MMHG | SYSTOLIC BLOOD PRESSURE: 132 MMHG | HEART RATE: 70 BPM | HEIGHT: 70 IN | BODY MASS INDEX: 24.91 KG/M2

## 2020-10-13 DIAGNOSIS — C67.9 MALIGNANT NEOPLASM OF URINARY BLADDER, UNSPECIFIED SITE (HCC): Primary | ICD-10-CM

## 2020-10-13 PROCEDURE — 99215 OFFICE O/P EST HI 40 MIN: CPT | Performed by: UROLOGY

## 2020-10-14 ENCOUNTER — APPOINTMENT (OUTPATIENT)
Dept: LAB | Facility: HOSPITAL | Age: 85
End: 2020-10-14
Attending: UROLOGY
Payer: COMMERCIAL

## 2020-10-14 DIAGNOSIS — C67.9 MALIGNANT NEOPLASM OF URINARY BLADDER, UNSPECIFIED SITE (HCC): ICD-10-CM

## 2020-10-14 LAB
ALBUMIN SERPL BCP-MCNC: 3.8 G/DL (ref 3.5–5)
ALP SERPL-CCNC: 77 U/L (ref 46–116)
ALT SERPL W P-5'-P-CCNC: 22 U/L (ref 12–78)
ANION GAP SERPL CALCULATED.3IONS-SCNC: 7 MMOL/L (ref 4–13)
AST SERPL W P-5'-P-CCNC: 17 U/L (ref 5–45)
BASOPHILS # BLD AUTO: 0.04 THOUSANDS/ΜL (ref 0–0.1)
BASOPHILS NFR BLD AUTO: 1 % (ref 0–1)
BILIRUB SERPL-MCNC: 0.6 MG/DL (ref 0.2–1)
BUN SERPL-MCNC: 19 MG/DL (ref 5–25)
CALCIUM SERPL-MCNC: 9.3 MG/DL (ref 8.3–10.1)
CHLORIDE SERPL-SCNC: 101 MMOL/L (ref 100–108)
CO2 SERPL-SCNC: 29 MMOL/L (ref 21–32)
CREAT SERPL-MCNC: 1.08 MG/DL (ref 0.6–1.3)
EOSINOPHIL # BLD AUTO: 0.14 THOUSAND/ΜL (ref 0–0.61)
EOSINOPHIL NFR BLD AUTO: 2 % (ref 0–6)
ERYTHROCYTE [DISTWIDTH] IN BLOOD BY AUTOMATED COUNT: 11.9 % (ref 11.6–15.1)
GFR SERPL CREATININE-BSD FRML MDRD: 63 ML/MIN/1.73SQ M
GLUCOSE SERPL-MCNC: 106 MG/DL (ref 65–140)
HCT VFR BLD AUTO: 43.6 % (ref 36.5–49.3)
HGB BLD-MCNC: 14.5 G/DL (ref 12–17)
IMM GRANULOCYTES # BLD AUTO: 0.02 THOUSAND/UL (ref 0–0.2)
IMM GRANULOCYTES NFR BLD AUTO: 0 % (ref 0–2)
LYMPHOCYTES # BLD AUTO: 1.54 THOUSANDS/ΜL (ref 0.6–4.47)
LYMPHOCYTES NFR BLD AUTO: 20 % (ref 14–44)
MCH RBC QN AUTO: 33.4 PG (ref 26.8–34.3)
MCHC RBC AUTO-ENTMCNC: 33.3 G/DL (ref 31.4–37.4)
MCV RBC AUTO: 101 FL (ref 82–98)
MONOCYTES # BLD AUTO: 0.69 THOUSAND/ΜL (ref 0.17–1.22)
MONOCYTES NFR BLD AUTO: 9 % (ref 4–12)
NEUTROPHILS # BLD AUTO: 5.36 THOUSANDS/ΜL (ref 1.85–7.62)
NEUTS SEG NFR BLD AUTO: 68 % (ref 43–75)
NRBC BLD AUTO-RTO: 0 /100 WBCS
PLATELET # BLD AUTO: 169 THOUSANDS/UL (ref 149–390)
PMV BLD AUTO: 9.3 FL (ref 8.9–12.7)
POTASSIUM SERPL-SCNC: 4.2 MMOL/L (ref 3.5–5.3)
PROT SERPL-MCNC: 8.2 G/DL (ref 6.4–8.2)
RBC # BLD AUTO: 4.34 MILLION/UL (ref 3.88–5.62)
SODIUM SERPL-SCNC: 137 MMOL/L (ref 136–145)
WBC # BLD AUTO: 7.79 THOUSAND/UL (ref 4.31–10.16)

## 2020-10-14 PROCEDURE — 36415 COLL VENOUS BLD VENIPUNCTURE: CPT

## 2020-10-14 PROCEDURE — 85025 COMPLETE CBC W/AUTO DIFF WBC: CPT

## 2020-10-14 PROCEDURE — 80053 COMPREHEN METABOLIC PANEL: CPT

## 2020-10-15 ENCOUNTER — TELEPHONE (OUTPATIENT)
Dept: SURGICAL ONCOLOGY | Facility: CLINIC | Age: 85
End: 2020-10-15

## 2020-10-19 ENCOUNTER — HOSPITAL ENCOUNTER (OUTPATIENT)
Dept: CT IMAGING | Facility: HOSPITAL | Age: 85
Discharge: HOME | End: 2020-10-19
Attending: UROLOGY
Payer: COMMERCIAL

## 2020-10-19 DIAGNOSIS — C67.9 MALIGNANT NEOPLASM OF URINARY BLADDER, UNSPECIFIED SITE (HCC): ICD-10-CM

## 2020-10-19 PROCEDURE — 74177 CT ABD & PELVIS W/CONTRAST: CPT

## 2020-10-19 PROCEDURE — G1004 CDSM NDSC: HCPCS

## 2020-10-19 PROCEDURE — 71260 CT THORAX DX C+: CPT

## 2020-10-19 RX ADMIN — IOHEXOL 100 ML: 350 INJECTION, SOLUTION INTRAVENOUS at 10:11

## 2020-10-21 ENCOUNTER — APPOINTMENT (EMERGENCY)
Dept: CT IMAGING | Facility: HOSPITAL | Age: 85
End: 2020-10-21
Payer: COMMERCIAL

## 2020-10-21 ENCOUNTER — HOSPITAL ENCOUNTER (EMERGENCY)
Facility: HOSPITAL | Age: 85
Discharge: HOME | End: 2020-10-21
Attending: EMERGENCY MEDICINE | Admitting: EMERGENCY MEDICINE
Payer: COMMERCIAL

## 2020-10-21 VITALS
DIASTOLIC BLOOD PRESSURE: 72 MMHG | HEIGHT: 70 IN | TEMPERATURE: 97.1 F | RESPIRATION RATE: 18 BRPM | HEART RATE: 83 BPM | WEIGHT: 172.19 LBS | OXYGEN SATURATION: 95 % | SYSTOLIC BLOOD PRESSURE: 132 MMHG | BODY MASS INDEX: 24.65 KG/M2

## 2020-10-21 DIAGNOSIS — I26.99 PULMONARY EMBOLISM ON RIGHT (HCC): Primary | ICD-10-CM

## 2020-10-21 LAB
ALBUMIN SERPL BCP-MCNC: 3.2 G/DL (ref 3.5–5)
ALP SERPL-CCNC: 84 U/L (ref 46–116)
ALT SERPL W P-5'-P-CCNC: 46 U/L (ref 12–78)
ANION GAP SERPL CALCULATED.3IONS-SCNC: 6 MMOL/L (ref 4–13)
APTT PPP: 28 SECONDS (ref 23–37)
AST SERPL W P-5'-P-CCNC: 30 U/L (ref 5–45)
ATRIAL RATE: 91 BPM
BASOPHILS # BLD AUTO: 0.02 THOUSANDS/ΜL (ref 0–0.1)
BASOPHILS NFR BLD AUTO: 0 % (ref 0–1)
BILIRUB SERPL-MCNC: 0.5 MG/DL (ref 0.2–1)
BUN SERPL-MCNC: 24 MG/DL (ref 5–25)
CALCIUM ALBUM COR SERPL-MCNC: 9.6 MG/DL (ref 8.3–10.1)
CALCIUM SERPL-MCNC: 9 MG/DL (ref 8.3–10.1)
CHLORIDE SERPL-SCNC: 99 MMOL/L (ref 100–108)
CO2 SERPL-SCNC: 29 MMOL/L (ref 21–32)
CREAT SERPL-MCNC: 1.1 MG/DL (ref 0.6–1.3)
EOSINOPHIL # BLD AUTO: 0.06 THOUSAND/ΜL (ref 0–0.61)
EOSINOPHIL NFR BLD AUTO: 1 % (ref 0–6)
ERYTHROCYTE [DISTWIDTH] IN BLOOD BY AUTOMATED COUNT: 11.8 % (ref 11.6–15.1)
GFR SERPL CREATININE-BSD FRML MDRD: 61 ML/MIN/1.73SQ M
GLUCOSE SERPL-MCNC: 117 MG/DL (ref 65–140)
HCT VFR BLD AUTO: 39 % (ref 36.5–49.3)
HGB BLD-MCNC: 13.2 G/DL (ref 12–17)
IMM GRANULOCYTES # BLD AUTO: 0.02 THOUSAND/UL (ref 0–0.2)
IMM GRANULOCYTES NFR BLD AUTO: 0 % (ref 0–2)
INR PPP: 1.1 (ref 0.84–1.19)
LYMPHOCYTES # BLD AUTO: 1.3 THOUSANDS/ΜL (ref 0.6–4.47)
LYMPHOCYTES NFR BLD AUTO: 13 % (ref 14–44)
MCH RBC QN AUTO: 33.3 PG (ref 26.8–34.3)
MCHC RBC AUTO-ENTMCNC: 33.8 G/DL (ref 31.4–37.4)
MCV RBC AUTO: 99 FL (ref 82–98)
MONOCYTES # BLD AUTO: 0.92 THOUSAND/ΜL (ref 0.17–1.22)
MONOCYTES NFR BLD AUTO: 10 % (ref 4–12)
NEUTROPHILS # BLD AUTO: 7.35 THOUSANDS/ΜL (ref 1.85–7.62)
NEUTS SEG NFR BLD AUTO: 76 % (ref 43–75)
NRBC BLD AUTO-RTO: 0 /100 WBCS
NT-PROBNP SERPL-MCNC: 167 PG/ML
P AXIS: 79 DEGREES
PLATELET # BLD AUTO: 205 THOUSANDS/UL (ref 149–390)
PMV BLD AUTO: 9.2 FL (ref 8.9–12.7)
POTASSIUM SERPL-SCNC: 3.8 MMOL/L (ref 3.5–5.3)
PR INTERVAL: 166 MS
PROT SERPL-MCNC: 8.3 G/DL (ref 6.4–8.2)
PROTHROMBIN TIME: 14.3 SECONDS (ref 11.6–14.5)
QRS AXIS: -47 DEGREES
QRSD INTERVAL: 114 MS
QT INTERVAL: 374 MS
QTC INTERVAL: 460 MS
RBC # BLD AUTO: 3.96 MILLION/UL (ref 3.88–5.62)
SODIUM SERPL-SCNC: 134 MMOL/L (ref 136–145)
T WAVE AXIS: 3 DEGREES
TROPONIN I SERPL-MCNC: <0.02 NG/ML
VENTRICULAR RATE: 91 BPM
WBC # BLD AUTO: 9.67 THOUSAND/UL (ref 4.31–10.16)

## 2020-10-21 PROCEDURE — 85610 PROTHROMBIN TIME: CPT | Performed by: PHYSICIAN ASSISTANT

## 2020-10-21 PROCEDURE — 93010 ELECTROCARDIOGRAM REPORT: CPT | Performed by: INTERNAL MEDICINE

## 2020-10-21 PROCEDURE — G1004 CDSM NDSC: HCPCS

## 2020-10-21 PROCEDURE — 80053 COMPREHEN METABOLIC PANEL: CPT | Performed by: PHYSICIAN ASSISTANT

## 2020-10-21 PROCEDURE — 99285 EMERGENCY DEPT VISIT HI MDM: CPT | Performed by: PHYSICIAN ASSISTANT

## 2020-10-21 PROCEDURE — 85025 COMPLETE CBC W/AUTO DIFF WBC: CPT | Performed by: PHYSICIAN ASSISTANT

## 2020-10-21 PROCEDURE — 71275 CT ANGIOGRAPHY CHEST: CPT

## 2020-10-21 PROCEDURE — 85730 THROMBOPLASTIN TIME PARTIAL: CPT | Performed by: PHYSICIAN ASSISTANT

## 2020-10-21 PROCEDURE — 99284 EMERGENCY DEPT VISIT MOD MDM: CPT

## 2020-10-21 PROCEDURE — 36415 COLL VENOUS BLD VENIPUNCTURE: CPT | Performed by: PHYSICIAN ASSISTANT

## 2020-10-21 PROCEDURE — 84484 ASSAY OF TROPONIN QUANT: CPT | Performed by: PHYSICIAN ASSISTANT

## 2020-10-21 PROCEDURE — 83880 ASSAY OF NATRIURETIC PEPTIDE: CPT | Performed by: PHYSICIAN ASSISTANT

## 2020-10-21 PROCEDURE — 93005 ELECTROCARDIOGRAM TRACING: CPT

## 2020-10-21 RX ADMIN — IOHEXOL 100 ML: 350 INJECTION, SOLUTION INTRAVENOUS at 14:45

## 2020-10-21 RX ADMIN — RIVAROXABAN 15 MG: 15 TABLET, FILM COATED ORAL at 15:22

## 2020-10-22 ENCOUNTER — RADIATION ONCOLOGY CONSULT (OUTPATIENT)
Dept: RADIATION ONCOLOGY | Facility: HOSPITAL | Age: 85
End: 2020-10-22
Attending: RADIOLOGY
Payer: COMMERCIAL

## 2020-10-22 VITALS
SYSTOLIC BLOOD PRESSURE: 128 MMHG | HEART RATE: 87 BPM | HEIGHT: 70 IN | TEMPERATURE: 98.6 F | OXYGEN SATURATION: 96 % | WEIGHT: 173.4 LBS | DIASTOLIC BLOOD PRESSURE: 80 MMHG | BODY MASS INDEX: 24.82 KG/M2 | RESPIRATION RATE: 16 BRPM

## 2020-10-22 DIAGNOSIS — C67.9 MALIGNANT NEOPLASM OF URINARY BLADDER, UNSPECIFIED SITE (HCC): Primary | ICD-10-CM

## 2020-10-22 PROCEDURE — G0463 HOSPITAL OUTPT CLINIC VISIT: HCPCS | Performed by: RADIOLOGY

## 2020-10-22 PROCEDURE — 99211 OFF/OP EST MAY X REQ PHY/QHP: CPT | Performed by: RADIOLOGY

## 2020-10-26 ENCOUNTER — PATIENT OUTREACH (OUTPATIENT)
Dept: UROLOGY | Facility: AMBULATORY SURGERY CENTER | Age: 85
End: 2020-10-26

## 2020-10-29 ENCOUNTER — HOSPITAL ENCOUNTER (OUTPATIENT)
Dept: RADIOLOGY | Age: 85
Discharge: HOME | End: 2020-10-29
Payer: COMMERCIAL

## 2020-10-29 DIAGNOSIS — C67.9 MALIGNANT NEOPLASM OF URINARY BLADDER, UNSPECIFIED SITE (HCC): ICD-10-CM

## 2020-10-29 LAB — GLUCOSE SERPL-MCNC: 85 MG/DL (ref 65–140)

## 2020-10-29 PROCEDURE — 82948 REAGENT STRIP/BLOOD GLUCOSE: CPT

## 2020-10-29 PROCEDURE — G1004 CDSM NDSC: HCPCS

## 2020-10-29 PROCEDURE — A9552 F18 FDG: HCPCS

## 2020-10-29 PROCEDURE — 78815 PET IMAGE W/CT SKULL-THIGH: CPT

## 2020-10-30 ENCOUNTER — TELEPHONE (OUTPATIENT)
Dept: RADIATION ONCOLOGY | Facility: HOSPITAL | Age: 85
End: 2020-10-30

## 2020-11-02 ENCOUNTER — PATIENT OUTREACH (OUTPATIENT)
Dept: UROLOGY | Facility: AMBULATORY SURGERY CENTER | Age: 85
End: 2020-11-02

## 2020-11-02 ENCOUNTER — TELEPHONE (OUTPATIENT)
Dept: RADIATION ONCOLOGY | Facility: HOSPITAL | Age: 85
End: 2020-11-02

## 2020-11-02 DIAGNOSIS — C67.9 MALIGNANT NEOPLASM OF URINARY BLADDER, UNSPECIFIED SITE (HCC): Primary | ICD-10-CM

## 2021-01-28 ENCOUNTER — IMMUNIZATIONS (OUTPATIENT)
Dept: FAMILY MEDICINE CLINIC | Facility: HOSPITAL | Age: 86
End: 2021-01-28

## 2021-01-28 DIAGNOSIS — Z23 ENCOUNTER FOR IMMUNIZATION: Primary | ICD-10-CM

## 2021-01-28 PROCEDURE — 91301 SARS-COV-2 / COVID-19 MRNA VACCINE (MODERNA) 100 MCG: CPT | Performed by: OBSTETRICS & GYNECOLOGY

## 2021-01-28 PROCEDURE — 0011A SARS-COV-2 / COVID-19 MRNA VACCINE (MODERNA) 100 MCG: CPT | Performed by: OBSTETRICS & GYNECOLOGY

## 2021-02-10 ENCOUNTER — APPOINTMENT (EMERGENCY)
Dept: RADIOLOGY | Facility: HOSPITAL | Age: 86
DRG: 064 | End: 2021-02-10
Payer: COMMERCIAL

## 2021-02-10 ENCOUNTER — HOSPITAL ENCOUNTER (INPATIENT)
Facility: HOSPITAL | Age: 86
LOS: 10 days | Discharge: HOSPICE | DRG: 064 | End: 2021-02-20
Attending: SURGERY | Admitting: INTERNAL MEDICINE
Payer: COMMERCIAL

## 2021-02-10 ENCOUNTER — APPOINTMENT (INPATIENT)
Dept: RADIOLOGY | Facility: HOSPITAL | Age: 86
DRG: 064 | End: 2021-02-10
Payer: COMMERCIAL

## 2021-02-10 DIAGNOSIS — N17.0 ATN (ACUTE TUBULAR NECROSIS) (HCC): ICD-10-CM

## 2021-02-10 DIAGNOSIS — Z51.5 END OF LIFE CARE: ICD-10-CM

## 2021-02-10 DIAGNOSIS — R47.01 EXPRESSIVE APHASIA: ICD-10-CM

## 2021-02-10 DIAGNOSIS — R47.01 APHASIA DETERMINED BY EXAMINATION: Primary | ICD-10-CM

## 2021-02-10 DIAGNOSIS — N13.30 BILATERAL HYDRONEPHROSIS: ICD-10-CM

## 2021-02-10 DIAGNOSIS — Z93.6 NEPHROSTOMY STATUS (HCC): ICD-10-CM

## 2021-02-10 DIAGNOSIS — N17.9 AKI (ACUTE KIDNEY INJURY) (HCC): ICD-10-CM
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
## 2021-02-10 PROBLEM — C78.00: Status: ACTIVE | Noted: 2021-02-10

## 2021-02-10 PROBLEM — I63.9 STROKE (HCC): Status: ACTIVE | Noted: 2021-02-10

## 2021-02-10 PROBLEM — C67.9: Status: ACTIVE | Noted: 2021-02-10

## 2021-02-10 PROBLEM — I26.99 PULMONARY EMBOLI (HCC): Status: ACTIVE | Noted: 2021-02-10

## 2021-02-10 LAB
ABO GROUP BLD: NORMAL
ANION GAP SERPL CALCULATED.3IONS-SCNC: 7 MMOL/L (ref 4–13)
APTT PPP: 30 SECONDS (ref 23–37)
BASE EXCESS BLDA CALC-SCNC: 2 MMOL/L (ref -2–3)
BASOPHILS # BLD AUTO: 0.03 THOUSANDS/ΜL (ref 0–0.1)
BASOPHILS NFR BLD AUTO: 0 % (ref 0–1)
BLD GP AB SCN SERPL QL: NEGATIVE
BUN SERPL-MCNC: 44 MG/DL (ref 5–25)
CALCIUM SERPL-MCNC: 10.7 MG/DL (ref 8.3–10.1)
CHLORIDE SERPL-SCNC: 105 MMOL/L (ref 100–108)
CO2 SERPL-SCNC: 25 MMOL/L (ref 21–32)
CREAT SERPL-MCNC: 1.68 MG/DL (ref 0.6–1.3)
EOSINOPHIL # BLD AUTO: 0.12 THOUSAND/ΜL (ref 0–0.61)
EOSINOPHIL NFR BLD AUTO: 1 % (ref 0–6)
ERYTHROCYTE [DISTWIDTH] IN BLOOD BY AUTOMATED COUNT: 13.2 % (ref 11.6–15.1)
ERYTHROCYTE [DISTWIDTH] IN BLOOD BY AUTOMATED COUNT: 13.3 % (ref 11.6–15.1)
GFR SERPL CREATININE-BSD FRML MDRD: 36 ML/MIN/1.73SQ M
GLUCOSE SERPL-MCNC: 127 MG/DL (ref 65–140)
GLUCOSE SERPL-MCNC: 132 MG/DL (ref 65–140)
HCO3 BLDA-SCNC: 27.2 MMOL/L (ref 24–30)
HCT VFR BLD AUTO: 32.7 % (ref 36.5–49.3)
HCT VFR BLD AUTO: 36.1 % (ref 36.5–49.3)
HCT VFR BLD CALC: 33 % (ref 36.5–49.3)
HGB BLD-MCNC: 10.7 G/DL (ref 12–17)
HGB BLD-MCNC: 11.4 G/DL (ref 12–17)
HGB BLDA-MCNC: 11.2 G/DL (ref 12–17)
IMM GRANULOCYTES # BLD AUTO: 0.1 THOUSAND/UL (ref 0–0.2)
IMM GRANULOCYTES NFR BLD AUTO: 1 % (ref 0–2)
INR PPP: 1.3 (ref 0.84–1.19)
LYMPHOCYTES # BLD AUTO: 1.55 THOUSANDS/ΜL (ref 0.6–4.47)
LYMPHOCYTES NFR BLD AUTO: 16 % (ref 14–44)
MCH RBC QN AUTO: 32.4 PG (ref 26.8–34.3)
MCH RBC QN AUTO: 32.5 PG (ref 26.8–34.3)
MCHC RBC AUTO-ENTMCNC: 31.6 G/DL (ref 31.4–37.4)
MCHC RBC AUTO-ENTMCNC: 32.7 G/DL (ref 31.4–37.4)
MCV RBC AUTO: 103 FL (ref 82–98)
MCV RBC AUTO: 99 FL (ref 82–98)
MONOCYTES # BLD AUTO: 0.94 THOUSAND/ΜL (ref 0.17–1.22)
MONOCYTES NFR BLD AUTO: 9 % (ref 4–12)
NEUTROPHILS # BLD AUTO: 7.24 THOUSANDS/ΜL (ref 1.85–7.62)
NEUTS SEG NFR BLD AUTO: 73 % (ref 43–75)
NRBC BLD AUTO-RTO: 0 /100 WBCS
PCO2 BLD: 28 MMOL/L (ref 21–32)
PCO2 BLD: 41.6 MM HG (ref 42–50)
PH BLD: 7.42 [PH] (ref 7.3–7.4)
PLATELET # BLD AUTO: 190 THOUSANDS/UL (ref 149–390)
PLATELET # BLD AUTO: 211 THOUSANDS/UL (ref 149–390)
PMV BLD AUTO: 9.2 FL (ref 8.9–12.7)
PMV BLD AUTO: 9.5 FL (ref 8.9–12.7)
PO2 BLD: 24 MM HG (ref 35–45)
POTASSIUM BLD-SCNC: 4.2 MMOL/L (ref 3.5–5.3)
POTASSIUM SERPL-SCNC: 4.1 MMOL/L (ref 3.5–5.3)
PROTHROMBIN TIME: 16.2 SECONDS (ref 11.6–14.5)
RBC # BLD AUTO: 3.29 MILLION/UL (ref 3.88–5.62)
RBC # BLD AUTO: 3.52 MILLION/UL (ref 3.88–5.62)
RH BLD: NEGATIVE
SAO2 % BLD FROM PO2: 44 % (ref 60–85)
SODIUM BLD-SCNC: 137 MMOL/L (ref 136–145)
SODIUM SERPL-SCNC: 137 MMOL/L (ref 136–145)
SPECIMEN EXPIRATION DATE: NORMAL
SPECIMEN SOURCE: ABNORMAL
TROPONIN I SERPL-MCNC: 0.04 NG/ML
WBC # BLD AUTO: 10.65 THOUSAND/UL (ref 4.31–10.16)
WBC # BLD AUTO: 9.98 THOUSAND/UL (ref 4.31–10.16)

## 2021-02-10 PROCEDURE — 82803 BLOOD GASES ANY COMBINATION: CPT

## 2021-02-10 PROCEDURE — 82947 ASSAY GLUCOSE BLOOD QUANT: CPT

## 2021-02-10 PROCEDURE — 72125 CT NECK SPINE W/O DYE: CPT

## 2021-02-10 PROCEDURE — 99204 OFFICE O/P NEW MOD 45 MIN: CPT | Performed by: SURGERY

## 2021-02-10 PROCEDURE — 85610 PROTHROMBIN TIME: CPT | Performed by: SURGERY

## 2021-02-10 PROCEDURE — 70450 CT HEAD/BRAIN W/O DYE: CPT

## 2021-02-10 PROCEDURE — 99285 EMERGENCY DEPT VISIT HI MDM: CPT

## 2021-02-10 PROCEDURE — 84132 ASSAY OF SERUM POTASSIUM: CPT

## 2021-02-10 PROCEDURE — 86850 RBC ANTIBODY SCREEN: CPT | Performed by: SURGERY

## 2021-02-10 PROCEDURE — G1004 CDSM NDSC: HCPCS

## 2021-02-10 PROCEDURE — 84484 ASSAY OF TROPONIN QUANT: CPT | Performed by: SURGERY

## 2021-02-10 PROCEDURE — 99223 1ST HOSP IP/OBS HIGH 75: CPT | Performed by: INTERNAL MEDICINE

## 2021-02-10 PROCEDURE — 85014 HEMATOCRIT: CPT

## 2021-02-10 PROCEDURE — 86900 BLOOD TYPING SEROLOGIC ABO: CPT | Performed by: SURGERY

## 2021-02-10 PROCEDURE — 90715 TDAP VACCINE 7 YRS/> IM: CPT | Performed by: EMERGENCY MEDICINE

## 2021-02-10 PROCEDURE — NC001 PR NO CHARGE: Performed by: SURGERY

## 2021-02-10 PROCEDURE — 85730 THROMBOPLASTIN TIME PARTIAL: CPT | Performed by: SURGERY

## 2021-02-10 PROCEDURE — 85027 COMPLETE CBC AUTOMATED: CPT | Performed by: INTERNAL MEDICINE

## 2021-02-10 PROCEDURE — 36415 COLL VENOUS BLD VENIPUNCTURE: CPT | Performed by: EMERGENCY MEDICINE

## 2021-02-10 PROCEDURE — 70496 CT ANGIOGRAPHY HEAD: CPT

## 2021-02-10 PROCEDURE — 85025 COMPLETE CBC W/AUTO DIFF WBC: CPT | Performed by: SURGERY

## 2021-02-10 PROCEDURE — 90471 IMMUNIZATION ADMIN: CPT

## 2021-02-10 PROCEDURE — 86901 BLOOD TYPING SEROLOGIC RH(D): CPT | Performed by: SURGERY

## 2021-02-10 PROCEDURE — 93005 ELECTROCARDIOGRAM TRACING: CPT

## 2021-02-10 PROCEDURE — 70498 CT ANGIOGRAPHY NECK: CPT

## 2021-02-10 PROCEDURE — 84295 ASSAY OF SERUM SODIUM: CPT

## 2021-02-10 PROCEDURE — 80048 BASIC METABOLIC PNL TOTAL CA: CPT | Performed by: SURGERY

## 2021-02-10 PROCEDURE — 99284 EMERGENCY DEPT VISIT MOD MDM: CPT | Performed by: PSYCHIATRY & NEUROLOGY

## 2021-02-10 PROCEDURE — NC001 PR NO CHARGE: Performed by: EMERGENCY MEDICINE

## 2021-02-10 RX ORDER — RIVAROXABAN 20 MG/1
20 TABLET, FILM COATED ORAL
COMMUNITY
Start: 2021-01-19 | End: 2021-02-20 | Stop reason: HOSPADM

## 2021-02-10 RX ORDER — HEPARIN SODIUM 10000 [USP'U]/100ML
300-2000 INJECTION, SOLUTION INTRAVENOUS
Status: DISCONTINUED | OUTPATIENT
Start: 2021-02-10 | End: 2021-02-12

## 2021-02-10 RX ORDER — ALBUMIN, HUMAN INJ 5% 5 %
12.5 SOLUTION INTRAVENOUS ONCE
Status: DISCONTINUED | OUTPATIENT
Start: 2021-02-10 | End: 2021-02-10

## 2021-02-10 RX ORDER — SENNOSIDES 8.6 MG
1 TABLET ORAL DAILY
Status: DISCONTINUED | OUTPATIENT
Start: 2021-02-11 | End: 2021-02-17

## 2021-02-10 RX ORDER — ACETAMINOPHEN 325 MG/1
650 TABLET ORAL EVERY 4 HOURS PRN
Status: DISCONTINUED | OUTPATIENT
Start: 2021-02-10 | End: 2021-02-20 | Stop reason: HOSPADM

## 2021-02-10 RX ORDER — MAGNESIUM HYDROXIDE/ALUMINUM HYDROXICE/SIMETHICONE 120; 1200; 1200 MG/30ML; MG/30ML; MG/30ML
30 SUSPENSION ORAL EVERY 6 HOURS PRN
Status: DISCONTINUED | OUTPATIENT
Start: 2021-02-10 | End: 2021-02-20 | Stop reason: HOSPADM

## 2021-02-10 RX ORDER — ASPIRIN 300 MG/1
600 SUPPOSITORY RECTAL ONCE
Status: COMPLETED | OUTPATIENT
Start: 2021-02-10 | End: 2021-02-10

## 2021-02-10 RX ORDER — ATORVASTATIN CALCIUM 40 MG/1
40 TABLET, FILM COATED ORAL EVERY EVENING
Status: DISCONTINUED | OUTPATIENT
Start: 2021-02-10 | End: 2021-02-17

## 2021-02-10 RX ORDER — ONDANSETRON 2 MG/ML
4 INJECTION INTRAMUSCULAR; INTRAVENOUS EVERY 6 HOURS PRN
Status: DISCONTINUED | OUTPATIENT
Start: 2021-02-10 | End: 2021-02-20 | Stop reason: HOSPADM

## 2021-02-10 RX ORDER — DOCUSATE SODIUM 100 MG/1
100 CAPSULE, LIQUID FILLED ORAL 2 TIMES DAILY
Status: DISCONTINUED | OUTPATIENT
Start: 2021-02-10 | End: 2021-02-17

## 2021-02-10 RX ORDER — SODIUM CHLORIDE 9 MG/ML
125 INJECTION, SOLUTION INTRAVENOUS CONTINUOUS
Status: DISCONTINUED | OUTPATIENT
Start: 2021-02-10 | End: 2021-02-11

## 2021-02-10 RX ORDER — SODIUM CHLORIDE 9 MG/ML
75 INJECTION, SOLUTION INTRAVENOUS CONTINUOUS
Status: DISCONTINUED | OUTPATIENT
Start: 2021-02-10 | End: 2021-02-10

## 2021-02-10 RX ORDER — HEPARIN SODIUM 10000 [USP'U]/100ML
3-30 INJECTION, SOLUTION INTRAVENOUS
Status: DISCONTINUED | OUTPATIENT
Start: 2021-02-10 | End: 2021-02-10

## 2021-02-10 RX ORDER — ASPIRIN 81 MG/1
81 TABLET, CHEWABLE ORAL DAILY
Status: DISCONTINUED | OUTPATIENT
Start: 2021-02-11 | End: 2021-02-11

## 2021-02-10 RX ADMIN — TETANUS TOXOID, REDUCED DIPHTHERIA TOXOID AND ACELLULAR PERTUSSIS VACCINE, ADSORBED 0.5 ML: 5; 2.5; 8; 8; 2.5 SUSPENSION INTRAMUSCULAR at 14:07

## 2021-02-10 RX ADMIN — ASPIRIN 600 MG: 300 SUPPOSITORY RECTAL at 16:55

## 2021-02-10 RX ADMIN — IOHEXOL 85 ML: 350 INJECTION, SOLUTION INTRAVENOUS at 15:38

## 2021-02-10 RX ADMIN — SODIUM CHLORIDE 75 ML/HR: 0.9 INJECTION, SOLUTION INTRAVENOUS at 17:44

## 2021-02-10 NOTE — ED NOTES
Per Dr Heather Slaughter neuro would like pts pressure to stay high but not above 941 systolic      Vernon Foster  02/10/21 3763

## 2021-02-10 NOTE — H&P
H&P Exam - Trauma   Ishan Root 81 y o  male MRN: [id number]  Unit/Bed#: TR 02 Encounter: [id number]    Assessment/Plan   Trauma Alert: Level B  Model of Arrival: Ambulance  Trauma Team: Attending Susanna Covington and Residents Leona Rangel and Ciro garner  Consultants: None    Trauma Active Problems: Fall, generalized weakness/malaise, intermittent aphasia, chronic anticoagulation with Xarelto     Trauma Plan: CT head/C-spine, cardiac workup     Chief Complaint: no complaints     History of Present Illness   HPI:  Ishan Root is a 81 y o  male with a past medical history of metastatic bladder cancer, 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, who presents for witnessed fall at home  Per EMS, patient c/o generalized weakness and malaise over today, fell at home, wife witnessed the fall  No LOC  Per EMS, no neuro deficits other than intermittent expressive aphasia  On exam, no focal neurologic deficits, GCS 15, alert/oriented x4  Upon further questioning, noticed to have expressive aphasia  Unknown last known normal, unable to confirm history with spouse  Discussed with neurology, consult placed, recommend CTA now, admit to medicine for MRI/stroke pathway  Mechanism:Fall    Review of Systems   Constitutional: Negative  HENT: Negative  Eyes: Negative  Respiratory: Negative  Cardiovascular: Negative  Gastrointestinal: Negative  Endocrine: Negative  Genitourinary: Negative  Musculoskeletal: Negative  Skin: Negative  Allergic/Immunologic: Negative  Neurological: Positive for weakness  Negative for dizziness, numbness and headaches  Hematological: Negative  Psychiatric/Behavioral: Negative  All other systems reviewed and are negative  12-point, complete review of systems was reviewed and negative except as stated above  Historical Information   History is obtained from the patient and EMS    No past medical history on file  No past surgical history on file    Social History   Social History     Substance and Sexual Activity   Alcohol Use Not on file     Social History     Substance and Sexual Activity   Drug Use Not on file     Social History     Tobacco Use   Smoking Status Not on file     No existing history information found  No existing history information found  Immunization History   Administered Date(s) Administered    Tdap 02/10/2021     Last Tetanus: unknown  Family History: Non-contributory      Meds/Allergies   all current active meds have been reviewed    No Known Allergies      PHYSICAL EXAM      Objective   Vitals:   First set: Temperature: 97 5 °F (36 4 °C) (02/10/21 1400)  Pulse: (!) 113 (02/10/21 1400)  Respirations: 18 (02/10/21 1400)  Blood Pressure: (!) 174/73 (02/10/21 1400)    Primary Survey:   (A) Airway: intact  (B) Breathing: bilateral symmetric breath sounds  (C) Circulation: Pulses:   normal  (D) Disabliity:  GCS Total:  15  (E) Expose:  Completed    Secondary Survey:   Physical Exam  Vitals signs reviewed  Constitutional:       Appearance: He is well-developed  He is not diaphoretic  HENT:      Head: Normocephalic and atraumatic  Right Ear: Tympanic membrane and external ear normal  No hemotympanum  Left Ear: Tympanic membrane and external ear normal  No hemotympanum  Nose: Nose normal       Mouth/Throat:      Pharynx: Uvula midline  Eyes:      General: No scleral icterus  Conjunctiva/sclera: Conjunctivae normal       Pupils: Pupils are equal, round, and reactive to light  Neck:      Musculoskeletal: Normal range of motion  No spinous process tenderness  Vascular: No JVD  Trachea: Phonation normal  No tracheal deviation  Cardiovascular:      Rate and Rhythm: Normal rate and regular rhythm  Pulses:           Carotid pulses are 2+ on the right side and 2+ on the left side  Femoral pulses are 2+ on the right side and 2+ on the left side  Dorsalis pedis pulses are 2+ on the right side and 2+ on the left side  Heart sounds: Normal heart sounds  No murmur  Pulmonary:      Effort: Pulmonary effort is normal  No respiratory distress  Breath sounds: Normal breath sounds  Chest:      Chest wall: No tenderness or crepitus  Abdominal:      General: Bowel sounds are normal  There is no distension  Palpations: Abdomen is soft  Abdomen is not rigid  Tenderness: There is no abdominal tenderness  There is no guarding  Musculoskeletal: Normal range of motion  Right shoulder: Normal       Left shoulder: Normal       Right elbow: Normal      Left elbow: Normal       Right wrist: Normal       Left wrist: Normal       Right hip: Normal       Left hip: Normal       Right knee: Normal       Left knee: Normal       Right ankle: Normal       Left ankle: Normal       Cervical back: Normal       Thoracic back: Normal       Lumbar back: Normal    Skin:     General: Skin is warm and dry  Capillary Refill: Capillary refill takes less than 2 seconds  Neurological:      Mental Status: He is alert and oriented to person, place, and time  GCS: GCS eye subscore is 4  GCS verbal subscore is 5  GCS motor subscore is 6  Cranial Nerves: No cranial nerve deficit  Sensory: No sensory deficit  Motor: No abnormal muscle tone  Comments: Patient is alert and oriented to person, place, time, and situation  Speech demonstrates intermittent expressive aphasia  No difficulty with naming objects, repetition intact  Cranial nerves II-XII intact  Sensation is intact bilaterally upper and lower extremities  Normal muscle tone, no clonus, no atrophy  5/5 muscle strength bilaterally upper extremities, 5/5 muscle strength bilaterally lower extremities  No pronator drift  No facial droop  Psychiatric:         Mood and Affect: Mood normal          Behavior: Behavior normal          Thought Content:  Thought content normal          Judgment: Judgment normal          Invasive Devices     Peripheral Intravenous Line Peripheral IV 02/10/21 Left Arm less than 1 day    Peripheral IV 02/10/21 Left Forearm less than 1 day                Lab Results: Results: I have personally reviewed pertinent reports  Imaging/EKG Studies: Results: I have personally reviewed pertinent reports      Other Studies: eFAST neg    Code Status: No Order  Advance Directive and Living Will:      Power of :    POLST:

## 2021-02-10 NOTE — ASSESSMENT & PLAN NOTE
Stroke pathway  CT head: No acute intracranial abnormality  Microangiopathic changes    Not a candidate for TPA due to xeralto and trauma  Telemetry monitoring  CTA of head and neck shows M3 occlusion   MRI of brain pending  NIH stroke scale   Speech and swallow evaluation at bedside  Neuro checks  Aspirin 81 mg  Statin 40 mg daily   Hold xeralto for now, await instructions from neurology  Ot/pt consult

## 2021-02-10 NOTE — H&P
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)  Assessment & Plan  Metastatic Bladder cancer:   Patient has recurrent hematuria and being followed by urology  He has muscle invasive urothelial cancer  He follows at [facility] for his cancer care  CTA chest:  Multiple somewhat ill-defined biapical pulmonary nodules measuring up to 1 4 cm in the left upper lobe  While these may be postinflammatory/postinfectious in nature, underlying malignancy to be excluded  Oncology consult  Urology consult     Pulmonary emboli [facility])  Assessment & Plan  Patient was noted to have PE and started on xeralto  Would hold until neurology clears     Stroke [facility])  Assessment & Plan  Stroke pathway  CT head: No acute intracranial abnormality  Microangiopathic changes  Not a candidate for TPA due to xeralto and trauma  Telemetry monitoring  CTA of head and neck shows M3 occlusion   MRI of brain pending  NIH stroke scale   Speech and swallow evaluation at bedside  Neuro checks  Aspirin 81 mg  Statin 40 mg daily   Hold xeralto for now, await instructions from neurology  Ot/pt consult      [address]  He got up from lunch table and fell backwards, he remember falling backwards, but does not remember since that time  He was noted to have expressive aphasia noted on exam by ED     Fall at home witnessed at home  Trauma did primary survey and need to follow up with him  So far negative work up     VTE Prophylaxis: Pharmacologic VTE Prophylaxis contraindicated due to stroke pathway  / sequential compression device   Code Status: full code   POLST: POLST is not applicable to this patient  Discussion with family: family at bedside updated     Anticipated Length of Stay:  Patient will be admitted on an Inpatient basis with an anticipated length of stay of  More than 2 midnights  Justification for Hospital Stay: for stroke with aphasia     Total Time for Visit, including Counseling / Coordination of Care: 45 minutes  Greater than 50% of this total time spent on direct patient counseling and coordination of care  Chief Complaint:   Fall     History of Present Illness:    Logan Gray is a 81 y o  male who presents with history of bladder cancer [place] [facility], has PE on xeralto presented after a fall at home  Initially a trauma admission he was cleared for fractures, head laceration  Patient was noted to have expressive aphasia on exam  He was having trouble finding words  Head CT was negative  Stroke alert was called, not a candidate for TPA  Will keep him NPO for speech evaluation  Review of Systems:    Review of Systems   Constitutional: Positive for activity change and fatigue  HENT: Negative  Eyes: Negative  Respiratory: Negative  Cardiovascular: Negative  Gastrointestinal: Negative  Genitourinary: Negative  Musculoskeletal: Positive for gait problem  Skin: Negative  Neurological: Positive for speech difficulty and weakness  Negative for dizziness  Psychiatric/Behavioral: Positive for confusion  Past Medical and Surgical History:     No past medical history on file  No past surgical history on file  Meds/Allergies:    Prior to Admission medications    Not on File     I have reviewed home medications with patient personally      Allergies: No Known Allergies    Social History:     Marital Status: /Civil Union   Occupation:    Patient Pre-hospital Living Situation: lives at home   Patient Pre-hospital Level of Mobility: able to ambulate   Patient Pre-hospital Diet Restrictions: cardiac now  Substance Use History:   Social History     Substance and Sexual Activity   Alcohol Use Not on file     Social History     Tobacco Use   Smoking Status Not on file     Social History     Substance and Sexual Activity   Drug Use Not on file       Family History:    No family history on file  Physical Exam:     Vitals:   Blood Pressure: (!) 188/102 (02/10/21 1600)  Pulse: (!) 110 (02/10/21 1600)  Temperature: 97 5 °F (36 4 °C) (02/10/21 1400)  Temp Source: Oral (02/10/21 1400)  Respirations: 18 (02/10/21 1600)  Weight - Scale: 64 9 kg (143 lb 1 3 oz) (02/10/21 1400)  SpO2: 98 % (02/10/21 1600)    Physical Exam  Vitals signs and nursing note reviewed  HENT:      Head: Normocephalic and atraumatic  Nose: Nose normal    Eyes:      Pupils: Pupils are equal, round, and reactive to light  Neck:      Musculoskeletal: Normal range of motion and neck supple  Cardiovascular:      Rate and Rhythm: Normal rate  Heart sounds: No murmur  No friction rub  No gallop  Abdominal:      General: Abdomen is flat  Bowel sounds are normal  There is no distension  Palpations: Abdomen is soft  There is no mass  Tenderness: There is no abdominal tenderness  There is no right CVA tenderness, left CVA tenderness, guarding or rebound  Hernia: No hernia is present  Musculoskeletal: Normal range of motion  General: No swelling, tenderness, deformity or signs of injury  Right lower leg: No edema  Left lower leg: No edema  Skin:     General: Skin is warm and dry  Neurological:      General: No focal deficit present  Mental Status: He is alert  Mental status is at baseline  He is disoriented  Cranial Nerves: No cranial nerve deficit  Sensory: No sensory deficit  Motor: No weakness        Coordination: Coordination normal       Gait: Gait normal       Deep Tendon Reflexes: Reflexes normal    Psychiatric:         Mood and Affect: Mood normal          Behavior: Behavior normal          Thought Content: Thought content normal          Judgment: Judgment normal               Additional Data:     Lab Results: I have personally reviewed pertinent reports  Results from last 7 days   Lab Units 02/10/21  1407   WBC Thousand/uL 9 98   HEMOGLOBIN g/dL 11 4*   HEMATOCRIT % 36 1*   PLATELETS Thousands/uL 211   NEUTROS PCT % 73   LYMPHS PCT % 16   MONOS PCT % 9   EOS PCT % 1     Results from last 7 days   Lab Units 02/10/21  1407   SODIUM mmol/L 137   POTASSIUM mmol/L 4 1   CHLORIDE mmol/L 105   CO2 mmol/L 25   BUN mg/dL 44*   CREATININE mg/dL 1 68*   ANION GAP mmol/L 7   CALCIUM mg/dL 10 7*   GLUCOSE RANDOM mg/dL 127     Results from last 7 days   Lab Units 02/10/21  1407   INR  1 30*                   Imaging: I have personally reviewed pertinent reports  TRAUMA - CT head wo contrast   Final Result by Jaja Alonzo MD (02/10 1450)      No acute intracranial abnormality  Microangiopathic changes  I personally discussed this study with Kaylin García on 2/10/2021 at 2:50 PM                   Workstation performed: [id number]         TRAUMA - CT spine cervical wo contrast   Final Result by Jaja Alonzo MD (02/10 1450)         1  No acute cervical spine fracture or traumatic malalignment  2   Multiple somewhat ill-defined biapical pulmonary nodules measuring up to 1 4 cm in the left upper lobe  While these may be postinflammatory/postinfectious in nature, underlying malignancy to be excluded  Recommend dedicated CT of the entire chest      Tube useful to compare to any prior studies the patient may have had  See above               I personally discussed this study with Kaylin García on 2/10/2021 at 2:39 PM                Workstation performed: [id number]         XR trauma multiple   Final Result by Dagoberto Andres MD (02/10 1343)   No acute traumatic injury   No active cardiopulmonary disease                   Workstation performed: [id number]         XR chest 1 view    (Results Pending)   CTA head and neck w wo contrast    (Results Pending)       EKG, Pathology, and Other Studies Reviewed on Admission:   · EKG:  Pending     Allscripts / Epic Records Reviewed: Yes     ** Please Note: This note has been constructed using a voice recognition system   **

## 2021-02-10 NOTE — ASSESSMENT & PLAN NOTE
Metastatic Bladder cancer:   Patient has recurrent hematuria and being followed by urology  He has muscle invasive urothelial cancer  He follows at [facility] for his cancer care  CTA chest:  Multiple somewhat ill-defined biapical pulmonary nodules measuring up to 1 4 cm in the left upper lobe  While these may be postinflammatory/postinfectious in nature, underlying malignancy to be excluded       Oncology consult  Urology consult

## 2021-02-10 NOTE — PROCEDURES
Procedure  POC FAST US    Date/Time: 2/10/2021 4:24 PM  Performed by: Yazmin Franks MD  Authorized by: Yazmin Franks MD     Patient location:  Trauma  Other Assisting Provider: Yes (comment) (Dr Darren Welch)    Procedure details:     Exam Type:  Diagnostic    Indications: blunt abdominal trauma      Technique: FAST      Views obtained:  Heart - Pericardial sac, RUQ - Vanessa's Pouch, LUQ - Splenorenal space and Suprapubic - Pouch of Deion    Image quality: diagnostic      Image availability:  Not obtained due to urgency  FAST Findings:     RUQ (Hepatorenal) free fluid: absent      LUQ (Splenorenal) free fluid: absent      Suprapubic free fluid: absent      Cardiac wall motion: identified      Pericardial effusion: absent    Interpretation:     Impressions: negative

## 2021-02-10 NOTE — ED NOTES
Patient was GCS 15 upon arrival  Patient now unable to tell me his  or where he is  Dr Mary Rivera with medicine made aware        Florentino Dalton  02/10/21 2221

## 2021-02-10 NOTE — ASSESSMENT & PLAN NOTE
New per family, he has word finding difficulty and expressive aphasia   Speech at time is fluid but worse at times  Stroke pathway and monitor closely   Speech and swallow evaluation at bedside  NPO for clearance  OT consult

## 2021-02-10 NOTE — ASSESSMENT & PLAN NOTE
He got up from lunch table and fell backwards, he remember falling backwards, but does not remember since that time  He was noted to have expressive aphasia noted on exam by ED     Fall at home witnessed at home  Trauma did primary survey and need to follow up with him  So far negative work up

## 2021-02-11 ENCOUNTER — APPOINTMENT (INPATIENT)
Dept: NON INVASIVE DIAGNOSTICS | Facility: HOSPITAL | Age: 86
DRG: 064 | End: 2021-02-11
Payer: COMMERCIAL

## 2021-02-11 ENCOUNTER — APPOINTMENT (INPATIENT)
Dept: RADIOLOGY | Facility: HOSPITAL | Age: 86
DRG: 064 | End: 2021-02-11
Payer: COMMERCIAL

## 2021-02-11 PROBLEM — R79.89 ELEVATED SERUM CREATININE: Status: ACTIVE | Noted: 2021-02-11

## 2021-02-11 LAB
ALBUMIN SERPL BCP-MCNC: 3.1 G/DL (ref 3.5–5)
ALP SERPL-CCNC: 68 U/L (ref 46–116)
ALT SERPL W P-5'-P-CCNC: 20 U/L (ref 12–78)
ANION GAP SERPL CALCULATED.3IONS-SCNC: 10 MMOL/L (ref 4–13)
APTT PPP: 114 SECONDS (ref 23–37)
APTT PPP: 32 SECONDS (ref 23–37)
APTT PPP: 39 SECONDS (ref 23–37)
APTT PPP: 43 SECONDS (ref 23–37)
AST SERPL W P-5'-P-CCNC: 45 U/L (ref 5–45)
ATRIAL RATE: 99 BPM
BASOPHILS # BLD AUTO: 0.01 THOUSANDS/ΜL (ref 0–0.1)
BASOPHILS NFR BLD AUTO: 0 % (ref 0–1)
BILIRUB SERPL-MCNC: 0.55 MG/DL (ref 0.2–1)
BUN SERPL-MCNC: 42 MG/DL (ref 5–25)
CALCIUM ALBUM COR SERPL-MCNC: 9.9 MG/DL (ref 8.3–10.1)
CALCIUM SERPL-MCNC: 9.2 MG/DL (ref 8.3–10.1)
CHLORIDE SERPL-SCNC: 112 MMOL/L (ref 100–108)
CHOLEST SERPL-MCNC: 95 MG/DL (ref 50–200)
CO2 SERPL-SCNC: 19 MMOL/L (ref 21–32)
CREAT SERPL-MCNC: 1.91 MG/DL (ref 0.6–1.3)
EOSINOPHIL # BLD AUTO: 0.05 THOUSAND/ΜL (ref 0–0.61)
EOSINOPHIL NFR BLD AUTO: 1 % (ref 0–6)
ERYTHROCYTE [DISTWIDTH] IN BLOOD BY AUTOMATED COUNT: 13.2 % (ref 11.6–15.1)
EST. AVERAGE GLUCOSE BLD GHB EST-MCNC: 120 MG/DL
GFR SERPL CREATININE-BSD FRML MDRD: 31 ML/MIN/1.73SQ M
GLUCOSE SERPL-MCNC: 114 MG/DL (ref 65–140)
GLUCOSE SERPL-MCNC: 78 MG/DL (ref 65–140)
GLUCOSE SERPL-MCNC: 87 MG/DL (ref 65–140)
HBA1C MFR BLD: 5.8 %
HCT VFR BLD AUTO: 28.3 % (ref 36.5–49.3)
HDLC SERPL-MCNC: 36 MG/DL
HGB BLD-MCNC: 8.9 G/DL (ref 12–17)
IMM GRANULOCYTES # BLD AUTO: 0.07 THOUSAND/UL (ref 0–0.2)
IMM GRANULOCYTES NFR BLD AUTO: 1 % (ref 0–2)
INR PPP: 1.34 (ref 0.84–1.19)
LDLC SERPL CALC-MCNC: 46 MG/DL (ref 0–100)
LYMPHOCYTES # BLD AUTO: 1.08 THOUSANDS/ΜL (ref 0.6–4.47)
LYMPHOCYTES NFR BLD AUTO: 13 % (ref 14–44)
MAGNESIUM SERPL-MCNC: 1.7 MG/DL (ref 1.6–2.6)
MCH RBC QN AUTO: 32.4 PG (ref 26.8–34.3)
MCHC RBC AUTO-ENTMCNC: 31.4 G/DL (ref 31.4–37.4)
MCV RBC AUTO: 103 FL (ref 82–98)
MONOCYTES # BLD AUTO: 0.84 THOUSAND/ΜL (ref 0.17–1.22)
MONOCYTES NFR BLD AUTO: 10 % (ref 4–12)
NEUTROPHILS # BLD AUTO: 6.58 THOUSANDS/ΜL (ref 1.85–7.62)
NEUTS SEG NFR BLD AUTO: 75 % (ref 43–75)
NRBC BLD AUTO-RTO: 0 /100 WBCS
P AXIS: 75 DEGREES
PHOSPHATE SERPL-MCNC: 3.1 MG/DL (ref 2.3–4.1)
PLATELET # BLD AUTO: 152 THOUSANDS/UL (ref 149–390)
PMV BLD AUTO: 9.2 FL (ref 8.9–12.7)
POTASSIUM SERPL-SCNC: 3.5 MMOL/L (ref 3.5–5.3)
PR INTERVAL: 178 MS
PROT SERPL-MCNC: 6.2 G/DL (ref 6.4–8.2)
PROTHROMBIN TIME: 16.6 SECONDS (ref 11.6–14.5)
QRS AXIS: -70 DEGREES
QRSD INTERVAL: 114 MS
QT INTERVAL: 338 MS
QTC INTERVAL: 433 MS
RBC # BLD AUTO: 2.75 MILLION/UL (ref 3.88–5.62)
SODIUM SERPL-SCNC: 141 MMOL/L (ref 136–145)
T WAVE AXIS: 11 DEGREES
TRIGL SERPL-MCNC: 65 MG/DL
VENTRICULAR RATE: 99 BPM
WBC # BLD AUTO: 8.63 THOUSAND/UL (ref 4.31–10.16)

## 2021-02-11 PROCEDURE — 85730 THROMBOPLASTIN TIME PARTIAL: CPT

## 2021-02-11 PROCEDURE — A9585 GADOBUTROL INJECTION: HCPCS | Performed by: INTERNAL MEDICINE

## 2021-02-11 PROCEDURE — 84100 ASSAY OF PHOSPHORUS: CPT | Performed by: INTERNAL MEDICINE

## 2021-02-11 PROCEDURE — 85025 COMPLETE CBC W/AUTO DIFF WBC: CPT | Performed by: INTERNAL MEDICINE

## 2021-02-11 PROCEDURE — G1004 CDSM NDSC: HCPCS

## 2021-02-11 PROCEDURE — 36415 COLL VENOUS BLD VENIPUNCTURE: CPT | Performed by: INTERNAL MEDICINE

## 2021-02-11 PROCEDURE — 93010 ELECTROCARDIOGRAM REPORT: CPT | Performed by: INTERNAL MEDICINE

## 2021-02-11 PROCEDURE — NC001 PR NO CHARGE: Performed by: PHYSICIAN ASSISTANT

## 2021-02-11 PROCEDURE — 85610 PROTHROMBIN TIME: CPT | Performed by: INTERNAL MEDICINE

## 2021-02-11 PROCEDURE — 92610 EVALUATE SWALLOWING FUNCTION: CPT

## 2021-02-11 PROCEDURE — 99215 OFFICE O/P EST HI 40 MIN: CPT | Performed by: PHYSICIAN ASSISTANT

## 2021-02-11 PROCEDURE — 85730 THROMBOPLASTIN TIME PARTIAL: CPT | Performed by: PHYSICIAN ASSISTANT

## 2021-02-11 PROCEDURE — 80053 COMPREHEN METABOLIC PANEL: CPT | Performed by: INTERNAL MEDICINE

## 2021-02-11 PROCEDURE — 85730 THROMBOPLASTIN TIME PARTIAL: CPT | Performed by: INTERNAL MEDICINE

## 2021-02-11 PROCEDURE — 82948 REAGENT STRIP/BLOOD GLUCOSE: CPT

## 2021-02-11 PROCEDURE — 93306 TTE W/DOPPLER COMPLETE: CPT | Performed by: INTERNAL MEDICINE

## 2021-02-11 PROCEDURE — 83735 ASSAY OF MAGNESIUM: CPT | Performed by: INTERNAL MEDICINE

## 2021-02-11 PROCEDURE — 99232 SBSQ HOSP IP/OBS MODERATE 35: CPT | Performed by: SURGERY

## 2021-02-11 PROCEDURE — 99232 SBSQ HOSP IP/OBS MODERATE 35: CPT | Performed by: NURSE PRACTITIONER

## 2021-02-11 PROCEDURE — 80061 LIPID PANEL: CPT | Performed by: INTERNAL MEDICINE

## 2021-02-11 PROCEDURE — 93306 TTE W/DOPPLER COMPLETE: CPT

## 2021-02-11 PROCEDURE — 70553 MRI BRAIN STEM W/O & W/DYE: CPT

## 2021-02-11 PROCEDURE — 83036 HEMOGLOBIN GLYCOSYLATED A1C: CPT | Performed by: INTERNAL MEDICINE

## 2021-02-11 RX ORDER — MIDODRINE HYDROCHLORIDE 5 MG/1
2.5 TABLET ORAL
Status: DISCONTINUED | OUTPATIENT
Start: 2021-02-11 | End: 2021-02-16

## 2021-02-11 RX ORDER — SODIUM CHLORIDE, SODIUM GLUCONATE, SODIUM ACETATE, POTASSIUM CHLORIDE, MAGNESIUM CHLORIDE, SODIUM PHOSPHATE, DIBASIC, AND POTASSIUM PHOSPHATE .53; .5; .37; .037; .03; .012; .00082 G/100ML; G/100ML; G/100ML; G/100ML; G/100ML; G/100ML; G/100ML
100 INJECTION, SOLUTION INTRAVENOUS CONTINUOUS
Status: DISCONTINUED | OUTPATIENT
Start: 2021-02-11 | End: 2021-02-15

## 2021-02-11 RX ORDER — POTASSIUM CHLORIDE 20 MEQ/1
20 TABLET, EXTENDED RELEASE ORAL ONCE
Status: DISCONTINUED | OUTPATIENT
Start: 2021-02-11 | End: 2021-02-11

## 2021-02-11 RX ORDER — POTASSIUM CHLORIDE 14.9 MG/ML
20 INJECTION INTRAVENOUS ONCE
Status: COMPLETED | OUTPATIENT
Start: 2021-02-11 | End: 2021-02-11

## 2021-02-11 RX ORDER — ALBUMIN (HUMAN) 12.5 G/50ML
25 SOLUTION INTRAVENOUS ONCE
Status: COMPLETED | OUTPATIENT
Start: 2021-02-11 | End: 2021-02-11

## 2021-02-11 RX ORDER — ALBUMIN, HUMAN INJ 5% 5 %
12.5 SOLUTION INTRAVENOUS ONCE
Status: COMPLETED | OUTPATIENT
Start: 2021-02-11 | End: 2021-02-11

## 2021-02-11 RX ADMIN — POTASSIUM CHLORIDE 20 MEQ: 14.9 INJECTION, SOLUTION INTRAVENOUS at 13:33

## 2021-02-11 RX ADMIN — ALBUMIN (HUMAN) 12.5 G: 12.5 INJECTION, SOLUTION INTRAVENOUS at 02:26

## 2021-02-11 RX ADMIN — ALBUMIN (HUMAN) 25 G: 0.25 INJECTION, SOLUTION INTRAVENOUS at 04:41

## 2021-02-11 RX ADMIN — GADOBUTROL 7 ML: 604.72 INJECTION INTRAVENOUS at 17:46

## 2021-02-11 RX ADMIN — MAGNESIUM OXIDE TAB 400 MG (241.3 MG ELEMENTAL MG) 400 MG: 400 (241.3 MG) TAB at 18:20

## 2021-02-11 RX ADMIN — CARBIDOPA AND LEVODOPA 2.5 MG: 50; 200 TABLET, EXTENDED RELEASE ORAL at 18:20

## 2021-02-11 RX ADMIN — HEPARIN SODIUM 500 UNITS/HR: 10000 INJECTION, SOLUTION INTRAVENOUS at 00:00

## 2021-02-11 RX ADMIN — SODIUM CHLORIDE, SODIUM GLUCONATE, SODIUM ACETATE, POTASSIUM CHLORIDE, MAGNESIUM CHLORIDE, SODIUM PHOSPHATE, DIBASIC, AND POTASSIUM PHOSPHATE 100 ML/HR: .53; .5; .37; .037; .03; .012; .00082 INJECTION, SOLUTION INTRAVENOUS at 07:03

## 2021-02-11 RX ADMIN — HEPARIN SODIUM 600 UNITS/HR: 10000 INJECTION, SOLUTION INTRAVENOUS at 07:20

## 2021-02-11 RX ADMIN — ATORVASTATIN CALCIUM 40 MG: 40 TABLET, FILM COATED ORAL at 18:22

## 2021-02-11 NOTE — ASSESSMENT & PLAN NOTE
Witnessed fall at home  Per reports, he got up from lunch table and fell backwards, he remember falling backwards, but does not remember since that time  He was noted to have expressive aphasia noted on exam by ED     · Appreciate trauma input   · As there is no traumatic injury, trauma has signed off  · Continue PT/OT   · Fall precautions

## 2021-02-11 NOTE — ASSESSMENT & PLAN NOTE
POA, Cr 1 68, unclear baseline   Cr today: 1 91  · Started Isolyte 100 mL/hr   · Avoid nephrotoxic agents and relative hypotension   · Monitor Cr in am

## 2021-02-11 NOTE — ASSESSMENT & PLAN NOTE
- Patient follows with [facility] as an outpatient and is involved with clinical trial receiving atezolizumab  - PA-MARIANNE Author Gist reached out at patient's wife request to discuss case and plan of care with patient's oncologist, Dr Doreen Drake and awaiting a return phone call  Today, reached out to Dr DOZIER [facility] office, left message requesting a return call

## 2021-02-11 NOTE — ASSESSMENT & PLAN NOTE
Patient was noted to have PE and started on Xarelto   · Holding Xarelto   · Continue Heparin gtt per neurology

## 2021-02-11 NOTE — ASSESSMENT & PLAN NOTE
Bladder cancer with muscle invasive urothelial cancer and known metastases to the lung, currently enrolled in clinical trial at [address] of DVT/PE on Xarelto   Patient has recurrent hematuria and being followed by urology  CTA chest:  Multiple somewhat ill-defined biapical pulmonary nodules measuring up to 1 4 cm in the left upper lobe  While these may be postinflammatory/postinfectious in nature, underlying malignancy to be excluded       · Oncology consult  · Urology consult

## 2021-02-11 NOTE — PROGRESS NOTES
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 currently enrolled in clinical trial at [facility], DVT/PE on [address] admitted after a fall and noted to have expressive aphasia  Overnight was noted to have worsening aphasia  Stat CTH completed and no acute findings noted  Neurology on call was contacted and recommended maintaining SBP >160 and <220  Plan:    - Continue on stroke pathway  - MRI brain with and without contrast pending     - Echocardiogram pending     - Hemoglobin A1c reviewed, 5 8     - Fasting lipid panel reviewed, total cholesterol 95, triglycerides 65, HDL 36, LDL 46     - Continue on Heparin gtt at this time, PTT 32 this AM  If MRI brain confirms ischemia, will need to consider alternative agent for anticoagulation (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), likely Lovenox in the setting of known malignancy  - Continue on atorvastatin 40 mg QPM     - Goal normothermia, euglycemia  - Allow for permissive hypertension, BP goal <220/110  Recommend maintain SBP >160 and <220  PRN antihypertensives as needed     - PT/OT/Speech therapy  - Stroke education     - Monitor exam and notify with changes  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 [facility])  Assessment & Plan   - Patient follows with [facility] as an outpatient and is involved with clinical trial receiving atezolizumab  - I have reached out at patient's wife request to discuss case and plan of care with patient's oncologist, Dr Shareen Lesches and I am awaiting a return phone call  Pulmonary emboli (HCC)  Assessment & Plan   - Maintained currently on Heparin gtt  PTT 32 this AM        Fall  Assessment & Plan   - Trauma signed off this AM, no traumatic injuries noted  Recommendations for outpatient neurological follow up have yet to be determined  Subjective:   Patient noted to have worsening aphasia overnight, repeat CTH unremarkable  Neurology contacted and recommended SBP goal 160-220  IVF was increased and patient received albumin overnight  ROS:  Unable to reliably assess secondary to aphasia  Medications  Scheduled Meds:  Current Facility-Administered Medications   Medication Dose Route Frequency Provider Last Rate    acetaminophen  650 mg Oral Q4H PRN Guy Calle MD      aluminum-magnesium hydroxide-simethicone  30 mL Oral Q6H PRN Guy Calle MD      aspirin  81 mg Oral Daily Guy Calle MD      atorvastatin  40 mg Oral QPM Guy Calle MD      docusate sodium  100 mg Oral BID Guy Calle MD      heparin (porcine)  300-2,000 Units/hr Intravenous Titrated [place] ESTHER Smith-C 600 Units/hr (02/11/21 0720)    insulin lispro  1-5 Units Subcutaneous TID AC Guy Calle MD      multi-electrolyte  100 mL/hr Intravenous Continuous [place], PA-C 100 mL/hr (02/11/21 0703)    ondansetron  4 mg Intravenous Q6H PRN Guy Calle MD      senna  1 tablet Oral Daily Guy Calle MD       Continuous Infusions:heparin (porcine), 300-2,000 Units/hr, Last Rate: 600 Units/hr (02/11/21 0720)  multi-electrolyte, 100 mL/hr, Last Rate: 100 mL/hr (02/11/21 0703)      PRN Meds:   acetaminophen    aluminum-magnesium hydroxide-simethicone    ondansetron    Vitals: Blood pressure 137/95, pulse 96, temperature 97 5 °F (36 4 °C), temperature source Oral, resp  rate 19, weight 64 9 kg (143 lb 1 3 oz), SpO2 94 %  ,There is no height or weight on file to calculate BMI  Physical Exam: /72   Pulse 96   Temp 97 5 °F (36 4 °C) (Oral)   Resp 16   Wt 64 9 kg (143 lb 1 3 oz)   SpO2 95%   General appearance: Awake and alert, word salad noted     Head: Normocephalic, without obvious abnormality, atraumatic  Eyes: negative findings: lids and lashes normal, conjunctivae and sclerae normal and pupils equal, round, reactive to light and accomodation  Lungs: clear to auscultation bilaterally  Heart: regular rate and rhythm  Abdomen: Soft, not distended  Extremities: extremities normal, warm and well-perfused; no cyanosis, clubbing, or edema  Skin: Skin color, texture, turgor normal  No rashes or lesions  Neurologic: Mental status: Awake and alert, able to follow some simple commands  Word salad noted, expressive>receptive aphasia  Cranial nerves: II: pupils equal, round, reactive to light and accommodation, III,VII: ptosis no ptosis noted, III,IV,VI: extraocular muscles extra-ocular motions intact, VII: upper facial muscle function normal bilaterally, VII: lower facial muscle function normal bilaterally  Sensory: Unable to reliably assess secondary to aphasia  Motor: Able to lift all extremities antigravity off of bed with no drift noted  Unable to formally assess motor strength secondary to aphasia       Labs  Recent Results (from the past 24 hour(s))   POCT Blood Gas (CG8+)    Collection Time: 02/10/21  2:03 PM   Result Value Ref Range    ph, Anupam ISTAT 7 423 (H) 7 300 - 7 400    pCO2, Anupam i-STAT 41 6 (L) 42 0 - 50 0 mm HG    pO2, Anupam i-STAT 24 0 (L) 35 0 - 45 0 mm HG    BE, i-STAT 2 -2 - 3 mmol/L    HCO3, Anupam i-STAT 27 2 24 0 - 30 0 mmol/L    CO2, i-STAT 28 21 - 32 mmol/L    O2 Sat, i-STAT 44 (L) 60 - 85 %    SODIUM, I-STAT 137 136 - 145 mmol/l    Potassium, i-STAT 4 2 3 5 - 5 3 mmol/L    Hct, i-STAT 33 (L) 36 5 - 49 3 %    Hgb, i-STAT 11 2 (L) 12 0 - 17 0 g/dl    Glucose, i-STAT 132 65 - 140 mg/dl    Specimen Type VENOUS    CBC and differential    Collection Time: 02/10/21  2:07 PM   Result Value Ref Range    WBC 9 98 4 31 - 10 16 Thousand/uL    RBC 3 52 (L) 3 88 - 5 62 Million/uL    Hemoglobin 11 4 (L) 12 0 - 17 0 g/dL    Hematocrit 36 1 (L) 36 5 - 49 3 %     (H) 82 - 98 fL    MCH 32 4 26 8 - 34 3 pg    MCHC 31 6 31 4 - 37 4 g/dL    RDW 13 3 11 6 - 15 1 %    MPV 9 5 8 9 - 12 7 fL    Platelets 012 329 - 761 Thousands/uL    nRBC 0 /100 WBCs    Neutrophils Relative 73 43 - 75 %    Immat GRANS % 1 0 - 2 %    Lymphocytes Relative 16 14 - 44 %    Monocytes Relative 9 4 - 12 %    Eosinophils Relative 1 0 - 6 %    Basophils Relative 0 0 - 1 %    Neutrophils Absolute 7 24 1 85 - 7 62 Thousands/µL    Immature Grans Absolute 0 10 0 00 - 0 20 Thousand/uL    Lymphocytes Absolute 1 55 0 60 - 4 47 Thousands/µL    Monocytes Absolute 0 94 0 17 - 1 22 Thousand/µL    Eosinophils Absolute 0 12 0 00 - 0 61 Thousand/µL    Basophils Absolute 0 03 0 00 - 0 10 Thousands/µL   Basic metabolic panel    Collection Time: 02/10/21  2:07 PM   Result Value Ref Range    Sodium 137 136 - 145 mmol/L    Potassium 4 1 3 5 - 5 3 mmol/L    Chloride 105 100 - 108 mmol/L    CO2 25 21 - 32 mmol/L    ANION GAP 7 4 - 13 mmol/L    BUN 44 (H) 5 - 25 mg/dL    Creatinine 1 68 (H) 0 60 - 1 30 mg/dL    Glucose 127 65 - 140 mg/dL    Calcium 10 7 (H) 8 3 - 10 1 mg/dL    eGFR 36 ml/min/1 73sq m   Protime-INR    Collection Time: 02/10/21  2:07 PM   Result Value Ref Range    Protime 16 2 (H) 11 6 - 14 5 seconds    INR 1 30 (H) 0 84 - 1 19   APTT    Collection Time: 02/10/21  2:07 PM   Result Value Ref Range    PTT 30 23 - 37 seconds   Type and screen    Collection Time: 02/10/21  2:07 PM   Result Value Ref Range    ABO Grouping O     Rh Factor Negative     Antibody Screen Negative     Specimen Expiration Date [id number]    Troponin I    Collection Time: 02/10/21  2:07 PM   Result Value Ref Range    Troponin I 0 04 <=0 04 ng/mL   ECG 12 lead    Collection Time: 02/10/21  8:01 PM   Result Value Ref Range    Ventricular Rate 99 BPM    Atrial Rate 99 BPM    NE Interval 178 ms    QRSD Interval 114 ms    QT Interval 338 ms    QTC Interval 433 ms    P Axis 75 degrees    QRS Axis -70 degrees    T Wave Axis 11 degrees   CBC    Collection Time: 02/10/21 11:34 PM   Result Value Ref Range WBC 10 65 (H) 4 31 - 10 16 Thousand/uL    RBC 3 29 (L) 3 88 - 5 62 Million/uL    Hemoglobin 10 7 (L) 12 0 - 17 0 g/dL    Hematocrit 32 7 (L) 36 5 - 49 3 %    MCV 99 (H) 82 - 98 fL    MCH 32 5 26 8 - 34 3 pg    MCHC 32 7 31 4 - 37 4 g/dL    RDW 13 2 11 6 - 15 1 %    Platelets 974 199 - 798 Thousands/uL    MPV 9 2 8 9 - 12 7 fL   APTT    Collection Time: 02/11/21 12:26 AM   Result Value Ref Range     (H) 23 - 37 seconds   Protime-INR    Collection Time: 02/11/21 12:26 AM   Result Value Ref Range    Protime 16 6 (H) 11 6 - 14 5 seconds    INR 1 34 (H) 0 84 - 1 19   Lipid Panel with Direct LDL reflex    Collection Time: 02/11/21  5:54 AM   Result Value Ref Range    Cholesterol 95 50 - 200 mg/dL    Triglycerides 65 <=150 mg/dL    HDL, Direct 36 (L) >=40 mg/dL    LDL Calculated 46 0 - 100 mg/dL   Hemoglobin A1c w/EAG Estimation    Collection Time: 02/11/21  5:54 AM   Result Value Ref Range    Hemoglobin A1C 5 8 (H) Normal 3 8-5 6%; PreDiabetic 5 7-6 4%;  Diabetic >=6 5%; Glycemic control for adults with diabetes <7 0% %     mg/dl   Comprehensive metabolic panel    Collection Time: 02/11/21  5:54 AM   Result Value Ref Range    Sodium 141 136 - 145 mmol/L    Potassium 3 5 3 5 - 5 3 mmol/L    Chloride 112 (H) 100 - 108 mmol/L    CO2 19 (L) 21 - 32 mmol/L    ANION GAP 10 4 - 13 mmol/L    BUN 42 (H) 5 - 25 mg/dL    Creatinine 1 91 (H) 0 60 - 1 30 mg/dL    Glucose 78 65 - 140 mg/dL    Calcium 9 2 8 3 - 10 1 mg/dL    Corrected Calcium 9 9 8 3 - 10 1 mg/dL    AST 45 5 - 45 U/L    ALT 20 12 - 78 U/L    Alkaline Phosphatase 68 46 - 116 U/L    Total Protein 6 2 (L) 6 4 - 8 2 g/dL    Albumin 3 1 (L) 3 5 - 5 0 g/dL    Total Bilirubin 0 55 0 20 - 1 00 mg/dL    eGFR 31 ml/min/1 73sq m   Magnesium    Collection Time: 02/11/21  5:54 AM   Result Value Ref Range    Magnesium 1 7 1 6 - 2 6 mg/dL   Phosphorus    Collection Time: 02/11/21  5:54 AM   Result Value Ref Range    Phosphorus 3 1 2 3 - 4 1 mg/dL   CBC and differential Collection Time: 02/11/21  5:54 AM   Result Value Ref Range    WBC 8 63 4 31 - 10 16 Thousand/uL    RBC 2 75 (L) 3 88 - 5 62 Million/uL    Hemoglobin 8 9 (L) 12 0 - 17 0 g/dL    Hematocrit 28 3 (L) 36 5 - 49 3 %     (H) 82 - 98 fL    MCH 32 4 26 8 - 34 3 pg    MCHC 31 4 31 4 - 37 4 g/dL    RDW 13 2 11 6 - 15 1 %    MPV 9 2 8 9 - 12 7 fL    Platelets 122 510 - 457 Thousands/uL    nRBC 0 /100 WBCs    Neutrophils Relative 75 43 - 75 %    Immat GRANS % 1 0 - 2 %    Lymphocytes Relative 13 (L) 14 - 44 %    Monocytes Relative 10 4 - 12 %    Eosinophils Relative 1 0 - 6 %    Basophils Relative 0 0 - 1 %    Neutrophils Absolute 6 58 1 85 - 7 62 Thousands/µL    Immature Grans Absolute 0 07 0 00 - 0 20 Thousand/uL    Lymphocytes Absolute 1 08 0 60 - 4 47 Thousands/µL    Monocytes Absolute 0 84 0 17 - 1 22 Thousand/µL    Eosinophils Absolute 0 05 0 00 - 0 61 Thousand/µL    Basophils Absolute 0 01 0 00 - 0 10 Thousands/µL   APTT - daily    Collection Time: 02/11/21  5:54 AM   Result Value Ref Range    PTT 32 23 - 37 seconds     Imaging   Personally reviewed images and reports in PACs  CTH- No evidence of acute vascular territorial infarction, intracranial hemorrhage or mass effect  VTE Prophylaxis: Heparin    Counseling / Coordination of Care  Total time spent today 35 minutes  Greater than 50% of total time was spent with the patient and / or family counseling and / or coordination of care  A description of the counseling / coordination of care: Time spent discussing plan of care and overnight events with patient's wife via telephone  Discussed that suspect patient had a stroke given significant speech deficit  Awaiting MRI brain for further evaluation  Contacted patient's oncology office at [facility] to discuss case and plan of care, awaiting a return phone call

## 2021-02-11 NOTE — ED NOTES
Full bed change completed  Condom cath applied due to patient not being able to express tolieting needs appropriately  Patient resting comfortably        Florentino Dalton  02/11/21 [address]  02/11/21 4708

## 2021-02-11 NOTE — PROGRESS NOTES
Notified by RN of worsening expressive aphasia  GCS 12-13 [id number]  Prior documented GCS 14-15  Neuro exam unchanged otherwise, no additional focal deficits  Discussed with on call neurology, appreciated  - Stat CTH: No evidence of acute vascular territorial infarction, intracranial hemorrhage or mass effect   - Neuro recommend to initiate non bolus stroke pathway heparin gtt, starting rate 500 units/hr    - IVF rate increased to 125cc/hr to allow for permissive HTN  SBP goal greater than 160, less than 220    - MRI brain w wo contrast pending   - Continue neuro checks, notify of any change

## 2021-02-11 NOTE — ASSESSMENT & PLAN NOTE
· Known history  Patient was on Xarelto   · Xarelto was held on admission and patient was initially placed on heparin GTT   Now on Lovenox 1mg/kg Q24hr as above

## 2021-02-11 NOTE — ASSESSMENT & PLAN NOTE
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, within the inferior parietal temporal region  Few scattered embolic infarcts in the right frontoparietal region and right cerebellar hemisphere  Possible central/cardiac source of emboli  No evidence of CNS metastases  · Echocardiogram LVEF 70% no regional wall abnormalities, grade 1 diastolic dysfunction, R ventricle, R atrium mildly dilated, mitral valve with mild/mod anular calcification, trace MR, TR and pulmonic valve regurgitation  AV with mild stenosis and trace regurgitation  · HgA1c 5 8, LDL 46, HDL 36, triglycerides  Plan:   - Continue on Heparin gtt at this time, PTT 37 this AM  Recommend therapeutic range 60-80 range per protocol    - Attending Neurologist and primary team discussed plan for anticoagulation  Initially recommended therapeutic Lovenox given suspected hypercoagulable state related to metastatic cancer, however patient currently with [facility]  Please see Attending Neurologist Attestation for further recommendations   - Continue on atorvastatin 40 mg QPM     - Goal normothermia, euglycemia  - Allow for permissive hypertension, BP goal -160 mmHg, avoid hypotension    - PT/OT/Speech therapy  - Stroke education     - Monitor exam and notify with changes  Imaging reviewed:  - 2/10/21 CTA head and neck:   · Left distal M2/and M3 occlusion   Imaging reviewed by Neurosurgeon Dr Tawana Salazar, no occlusion noted by him   · Multiple apical pulmonary nodules largest measuring 1 3 cm concerning for possible neoplasm  Based on current Fleischner Society 2017 Guidelines on incidental pulmonary nodule, follow-up nonemergent outpatient enhanced chest CT and/or PET/CT recommended for further evaluation  These could be neoplastic/metastatic, correlate clinically  - 2/10/21 CT head wo contrast: No evidence of acute vascular territorial infarction, intracranial hemorrhage or mass effect  - 2/10/21 CT cervical spine:   · No acute cervical spine fracture or traumatic malalignment  Multiple somewhat ill-defined biapical pulmonary nodules measuring up to 1 4 cm in the left upper lobe  While these may be postinflammatory/postinfectious in nature, underlying malignancy to be excluded    Recommend dedicated CT of the entire chest

## 2021-02-11 NOTE — UTILIZATION REVIEW
Notification of Inpatient Admission/Inpatient Authorization Request   This is a Notification of Inpatient Admission for [address]  Be advised that this patient was admitted to our facility under Inpatient Status  Contact Demetria Wilson at [phone] for additional admission information  Job Mendoza  DEPT  DEDICATED -[phone]  Patient Name:   Wallace Moy   YOB: 1935       [address]   [address]:   [address]  Tax ID: [id number]  NPI: [id number] Attending Provider/NPI:  Phone:  Address: Lizette Cotton [[id number]]  [phone]  Same as Facility   Place of Service Code: 24 Place of Service Name:  [address] [place] [address]   Start Date: 2/10/21 1506 Discharge Date & Time: No discharge date for patient encounter  Type of Admission: Inpatient Status Discharge Disposition (if discharged): Final discharge disposition not confirmed   Patient Diagnoses: Urgent care center as place of occurrence of external cause [[id number] [id number]]     Orders: Admission Orders (From admission, onward)     Ordered        02/10/21 1506  Inpatient Admission  Once                    Assigned Utilization Review Contact: Demetria Wilson  Utilization   Network Utilization Review Department  Phone: [phone]; Fax [phone]  Email: [email or web address]   ATTENTION PAYERS: Please call the assigned Utilization  directly with any questions or concerns ALL voicemails in the department are confidential  Send all requests for admission clinical reviews, approved or denied determinations and any other requests to dedicated fax number belonging to the campus where the patient is receiving treatment

## 2021-02-11 NOTE — ASSESSMENT & PLAN NOTE
· Per record review, on 2/10/21, patient was noted to be expressively aphasic, last known well at noon that day, with aphasia noted right after a fall  · Did not receive tPA due to Xarelto use  · Initial CT head with microangiopathic changes, no acute intracranial abnormality  CTA head/neck with left distal M2 and M3 occlusion however no occlusion was noted by Neurosurgeon per Neurology records  Repeat CT head from 2/10/21 due to worsening aphasia with no evidence of acute vascular territorial infarction, intracranial hemorrhage or mass effect  · MRI brain revealed cluster of acute to early subacute embolic infarcts in the left MCA territory, within the inferior parietal temporal region  Few scattered embolic infarcts in the frontoparietal region and right cerebellar hemisphere  Possible central/cardiac source  · Per Neurology, discontinue heparin GTT (PTT was not therapeutic) and switch to Lovenox (1mg/kg Q24hr, dosing per Neuro/Nephro/Pharmacy)  Neurology has been attempting to reach patient's Oncology team   · Continue goal permissive hypertension per Neuro, -160   Patient on midodrine per Neuro

## 2021-02-11 NOTE — SPEECH THERAPY NOTE
Speech Language/Pathology  Speech-Language Pathology Bedside Swallow Evaluation      Patient Name: Negro Rico    QLFUW'M Date: 2/11/2021     Problem List  Principal Problem:    Expressive aphasia  Active Problems:    Fall    Stroke [facility])    Pulmonary emboli ([address] )    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 [facility])      Past Medical History  No past medical history on file  Past Surgical History  No past surgical history on file  Summary   Pt presented with  s/s suggestive of mild oral and suspected mild pharyngeal dysphagia  Symptoms or concerns included suspected decreased control of solids mixed w/ liquids,  suspected pharyngeal swallow delay, suspected decreased hyolaryngeal elevation upon palpation and multiple swallows  Pt w/ delayed coughing with thin noted sherley following the solids to clear  This continued to occur w/ ongoing trials  Risk/s for Aspiration: mild    Recommended Diet: soft/level 3 diet and nectar thick liquids   Recommended Form of Meds: whole with puree   Aspiration precautions and swallowing strategies: upright posture, only feed when fully alert, slow rate of feeding and small bites/sips  Other Recommendations/Considerations: pt with difficulty following commands, at times will follow other times cannot  Will need some supervision        Current Medical Status  Pt is a 81 y o  male who presented to [facility] 2/10/2021 with  history of bladder cancer follows [facility], has PE on xeralto presented after a fall at home  Initially a trauma admission he was cleared for fractures, head laceration  Patient was noted to have expressive aphasia on exam  He was having trouble finding words  Head CT was negative  Stroke alert was called, not a candidate for TPA  Was kept  NPO for speech evaluation        Current Precautions:  Fall  Aspiration  Contact  C diff  Droplet  Seizure  Delirium    Allergies:  No known food allergies    Past medical history:  Please see H&P for details    Special Studies:  CT -No evidence of acute vascular territorial infarction, intracranial hemorrhage or mass effect   MRI-pending    Social/Education/Vocational Hx:  Pt lives with family    Swallow Information   Current Risks for Dysphagia & Aspiration: CVA and AMS  Current Symptoms/Concerns: change in MS  Current Diet: NPO   Baseline Diet: regular diet and thin liquids      Baseline Assessment   Behavior/Cognition: alert and difficulty following commands  Speech/Language Status: limited verbal output and pt w/ difficulty w/ speech output, will need eval  Patient Positioning: upright in bed  Pain Status/Interventions/Response to Interventions:   No report of or nonverbal indications of pain  Swallow Mechanism Exam  Facial: symmetrical  Labial: WFL  Lingual: WFL  Velum: unable to visualize  Mandible: adequate ROM  Dentition: adequate  Vocal quality:clear/adequate   Volitional Cough: weak   Respiratory Status: on RA     Consistencies Assessed and Performance   Consistencies Administered: thin liquids, nectar thick, puree, soft solids and hard solids  Liquids by straw/cup    Oral Stage: mild  Mastication was mildly prolonged with the solid materials administered today  Bolus formation and transfer were functional though slow, pt w/o gross oral residue  ?spill prior to swallow with the solids during mastication  Pharyngeal Stage: mild  Swallow Mechanics:  Swallowing initiation appeared prompt  Laryngeal rise was palpated and judged to be mildly slow  No coughing, throat clearing, change in vocal quality or respiratory status noted today       Esophageal Concerns: none reported    Strategies and Efficacy: -    Summary and Recommendations (see above)    Results Reviewed with: patient and RN     Treatment Recommended: - yes    Frequency of treatment: as able   Patient Stated Goal:    Dysphagia LTG  -Patient will demonstrate safe and effective oral intake (without overt s/s significant oral/pharyngeal dysphagia including s/s penetration or aspiration) for the highest appropriate diet level  Speech Therapy Prognosis   Prognosis: good    Prognosis Considerations: ? CVA

## 2021-02-11 NOTE — UTILIZATION REVIEW
Initial Clinical Review    Admission: Date/Time/Statement:   Admission Orders (From admission, onward)     Ordered        02/10/21 1506  Inpatient Admission  Once                   Orders Placed This Encounter   Procedures    Inpatient Admission     Standing Status:   Standing     Number of Occurrences:   1     Order Specific Question:   Level of Care     Answer:   Med Surg [16]     Order Specific Question:   Estimated length of stay     Answer:   More than 2 Midnights     Order Specific Question:   Certification     Answer:   I certify that inpatient services are medically necessary for this patient for a duration of greater than two midnights  See H&P and MD Progress Notes for additional information about the patient's course of treatment  ED Arrival Information     Expected Arrival Acuity Means of Arrival Escorted By Service Admission Type    - 2/10/2021 13:55 Emergent Ambulance [facility] Jermaine 84 from standing, at home        Chief Complaint   Patient presents with    Fall     fall backwards with + head strike     Assessment/Plan:  81year old male, presented to the ED @ [facility] from home via EMS  Admitted as Inpatient due to  Expressive Aphasia  Initially a trauma admission he was cleared for fractures, head laceration  Patient was noted to have expressive aphasia on exam  He was having trouble finding words  Head CT was negative  Stroke alert was called, not a candidate for TPA  Will keep him NPO for speech evaluation  Stroke:  Monitor on telemetry  CTA of head and neck shows M3 occlusion  MRI brain:  Pending  Consult speech and swallow at bedside  Neuro checks  ASA / Statin  Hold xeralto for now, waiting for Neuro consult,  Consult PT/OT/ST      VTE Prophylaxis: Pharmacologic VTE Prophylaxis contraindicated due to stroke pathway  / sequential compression device     02/10/2021  Consult Neuro:  No tPA due to xarelto use- last taken yesterday  Continue xarelto for now, if stroke noted on MRI-- would switch  Consider lovenox injections as concern for hypercoagulable state in the setting of bladder carcinoma  Recommend MRI brain w/w/o contrast to make sure no metastases      ED Triage Vitals   Temperature Pulse Respirations Blood Pressure SpO2   02/10/21 1400 02/10/21 1400 02/10/21 1400 02/10/21 1400 02/10/21 1400   97 5 °F (36 4 °C) (!) 113 18 (!) 174/73 100 %      Temp Source Heart Rate Source Patient Position - Orthostatic VS BP Location FiO2 (%)   02/10/21 1400 02/10/21 1400 02/10/21 1400 02/10/21 1800 --   Oral Monitor Lying Right arm       Pain Score       --                 Wt Readings from Last 1 Encounters:   02/10/21 64 9 kg (143 lb 1 3 oz)     Additional Vital Signs:   Date/Time  Temp  Pulse  Resp  BP  MAP (mmHg)  SpO2  O2 Device  Patient Position - Orthostatic VS   02/11/21 0945  --  96  19  144/70  101  95 %  --  --   02/11/21 0845  --  96  20  135/69  97  95 %  None (Room air)  --   02/11/21 0745  --  96  19  137/95  111  94 %  None (Room air)  --   02/11/21 0645  --  100  20  161/99  119  96 %  --  --   02/11/21 0618  --  102  18  153/73  --  96 %  --  --   02/11/21 0615  --  --  --  --  109  --  --  --   02/11/21 0600  --  96  17  137/66  --  100 %  --  --   02/11/21 0530  --  104  18  188/95Abnormal   126  93 %  --  --   02/11/21 0500  --  100  29Abnormal   --  --  93 %  --  --   02/11/21 0400  --  104  20  165/79  --  94 %  --  --   02/11/21 0315  --  102  20  144/75  100  96 %  --  --   02/11/21 0300  --  100  15  141/72  --  97 %  --  --   02/11/21 0200  --  104  27Abnormal   146/78  --  97 %  --  --   02/11/21 0145  --  106Abnormal   18  142/67  97  97 %  None (Room air)  --   02/11/21 0100  --  104  20  168/80  112  96 %  --  --   02/11/21 0000  --  102  18  146/76  --  97 %  --  --   02/10/21 2345  --  106Abnormal   --  150/87  110  95 %  --  --   02/10/21 2330  --  102  --  150/78  106  98 %  --  --   02/10/21 2230  -- 102  18  164/92  114  96 %  --  --   02/10/21 2101  --  100  18  154/88  --  98 %  None (Room air)  Lying   02/10/21 2000  --  100  19  141/80  --  97 %  --  --   02/10/21 1900  --  106Abnormal   20  121/85  --  98 %  --  --   02/10/21 1800  --  112Abnormal   22  177/119Abnormal   141  97 %  --  Lying   02/10/21 1700  --  114Abnormal   18  167/73  --  98 %  --  Lying   02/10/21 1600  --  110Abnormal   18  188/102Abnormal   --  98 %  --  --   02/10/21 1415  --  110Abnormal   18  150/69  --  --  --  --     Date and Time Eye Opening Best Verbal Response Best Motor Response Anahy Coma Scale Score   02/11/21 0600 4 3 6 13   02/11/21 0500 4 3 6 13   02/11/21 0400 4 3 6 13   02/11/21 0300 4 3 6 13   02/11/21 0200 4 3 6 13   02/11/21 0100 4 3 6 13   02/11/21 0000 4 3 6 13   02/10/21 2000 4 3 6 13   02/10/21 1900 4 3 6 13   02/10/21 1800 4 4 6 14   02/10/21 1700 4 4 6 14   02/10/21 1600 4 4 6 14   02/10/21 1415 4 5 6 15   02/10/21 1400 4 5 6 15     02/10/2021 @ 2304  CT head:  No evidence of acute vascular territorial infarction, intracranial hemorrhage or mass effect  02/10/2021 @ 1634  CTa Head/neck:  Left distal M2/and M3 occlusion  Multiple apical pulmonary nodules largest measuring 1 3 cm concerning for possible neoplasm  Based on current Fleischner Society 2017 Guidelines on incidental pulmonary nodule, follow-up nonemergent outpatient enhanced chest CT and/or PET/CT recommended for further evaluation  These could be neoplastic/metastatic, correlate clinically  02/10/2021 @ 1445  CT head (TRAUMA):  No acute intracranial abnormality   Microangiopathic changes  02/10/2021 @ 1425  CT C Spine (TRAUMA):  1   No acute cervical spine fracture or traumatic malalignment     2   Multiple somewhat ill-defined biapical pulmonary nodules measuring up to 1 4 cm in the left upper lobe   While these may be postinflammatory/postinfectious in nature, underlying malignancy to be excluded   Recommend dedicated CT of the entire chest     Tube useful to compare to any prior studies the patient may have had       02/10/2021 @ 1502  Chest X (TRAUMA):  No acute traumatic injury   No active cardiopulmonary disease     02/10/2021 @   EC, NSR    Pertinent Labs/Diagnostic Test Results:     Results from last 7 days   Lab Units 21  0554 02/10/21  2334 02/10/21  1407 02/10/21  1403   WBC Thousand/uL 8 63 10 65* 9 98  --    HEMOGLOBIN g/dL 8 9* 10 7* 11 4*  --    I STAT HEMOGLOBIN g/dl  --   --   --  11 2*   HEMATOCRIT % 28 3* 32 7* 36 1*  --    HEMATOCRIT, ISTAT %  --   --   --  33*   PLATELETS Thousands/uL 152 190 211  --    NEUTROS ABS Thousands/µL 6 58  --  7 24  --      Results from last 7 days   Lab Units 21  0554 02/10/21  1407 02/10/21  1403   SODIUM mmol/L 141 137  --    POTASSIUM mmol/L 3 5 4 1  --    CHLORIDE mmol/L 112* 105  --    CO2 mmol/L 19* 25  --    CO2, I-STAT mmol/L  --   --  28   ANION GAP mmol/L 10 7  --    BUN mg/dL 42* 44*  --    CREATININE mg/dL 1 91* 1 68*  --    EGFR ml/min/1 73sq m 31 36  --    CALCIUM mg/dL 9 2 10 7*  --    MAGNESIUM mg/dL 1 7  --   --    PHOSPHORUS mg/dL 3 1  --   --      Results from last 7 days   Lab Units 21  0554   AST U/L 45   ALT U/L 20   ALK PHOS U/L 68   TOTAL PROTEIN g/dL 6 2*   ALBUMIN g/dL 3 1*   TOTAL BILIRUBIN mg/dL 0 55     Results from last 7 days   Lab Units 21  0554 02/10/21  1407   GLUCOSE RANDOM mg/dL 78 127     Results from last 7 days   Lab Units 21  0554   HEMOGLOBIN A1C % 5 8*   EAG mg/dl 120     Results from last 7 days   Lab Units 02/10/21  1403   PH, AYUSH I-STAT  7 423*   PCO2, AYUSH ISTAT mm HG 41 6*   PO2, AYUSH ISTAT mm HG 24 0*   HCO3, AYUSH ISTAT mmol/L 27 2   I STAT BASE EXC mmol/L 2   I STAT O2 SAT % 44*     Results from last 7 days   Lab Units 02/10/21  1407   TROPONIN I ng/mL 0 04     Results from last 7 days   Lab Units 21  0554 21  0026 02/10/21  1407   PROTIME seconds  --  16 6* 16 2*   INR   --  1 34* 1 30*   PTT seconds 32 114* 30     ED Treatment:   Medication Administration from 02/10/2021 1349 to 02/11/2021 1007       Date/Time Order Dose Route Action     02/10/2021 1407 tetanus-diphtheria-acellular pertussis (BOOSTRIX) IM injection 0 5 mL 0 5 mL Intramuscular Given     02/10/2021 1655 aspirin rectal suppository 600 mg 600 mg Rectal Given     02/10/2021 1538 iohexol (OMNIPAQUE) 350 MG/ML injection (SINGLE-DOSE) 100 mL 85 mL Intravenous Given     02/10/2021 1744 sodium chloride 0 9 % infusion 75 mL/hr Intravenous New Bag     02/11/2021 0720 heparin (porcine) 25,000 units in 0 45% NaCl 250 mL infusion (premix) 600 Units/hr Intravenous New Bag     02/11/2021 0000 heparin (porcine) 25,000 units in 0 45% NaCl 250 mL infusion (premix) 500 Units/hr Intravenous New Bag     02/11/2021 0226 albumin human (FLEXBUMIN) 5 % injection 12 5 g 12 5 g Intravenous New Bag     02/11/2021 0441 albumin human (FLEXBUMIN) 25 % injection 25 g 25 g Intravenous New Bag     02/11/2021 0703 multi-electrolyte (PLASMALYTE-A/ISOLYTE-S PH 7 4) IV solution 100 mL/hr Intravenous New Bag        No past medical history on file  Present on Admission:   Stroke [facility])   Pulmonary emboli (HCC)   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 [facility])      Admitting Diagnosis: Urgent care center as place of occurrence of external cause [Y92 532]  Age/Sex: 81 y o  male  Admission Orders:  Scheduled Medications:  atorvastatin, 40 mg, Oral, QPM  docusate sodium, 100 mg, Oral, BID  insulin lispro, 1-5 Units, Subcutaneous, TID AC  senna, 1 tablet, Oral, Daily      Continuous IV Infusions:  heparin (porcine), 300-2,000 Units/hr, Intravenous, Titrated  multi-electrolyte, 100 mL/hr, Intravenous, Continuous      PRN Meds:  acetaminophen, 650 mg, Oral, Q4H PRN  aluminum-magnesium hydroxide-simethicone, 30 mL, Oral, Q6H PRN  ondansetron, 4 mg, Intravenous, Q6H PRN      Dysphagia assessment > NPO  Neuro checks q1h x4h, q2hx 8hours then q4h x 72h    MRI brain: Pending  Consult PT/OT/ST  Telemetry  [place] SCDs  IP CONSULT TO NEUROLOGY  IP CONSULT TO PHYSICAL MEDICINE REHAB  IP CONSULT TO NEUROLOGY  IP CONSULT TO CASE MANAGEMENT  IP CONSULT TO NUTRITION SERVICES    Network Utilization Review Department  ATTENTION: Please call with any questions or concerns to [phone] and carefully listen to the prompts so that you are directed to the right person  All voicemails are confidential   Robyn Salguero all requests for admission clinical reviews, approved or denied determinations and any other requests to dedicated fax number below belonging to the campus where the patient is receiving treatment   List of dedicated fax numbers for the Facilities:  [address] DENIALS (Administrative/Medical Necessity) [phone]   [address] (Maternity/NICU/Pediatrics) [phone]   [address] Judie Montanez 2178 (  Galilea Sinha "Michelle" 103) [address] [place] [address] [address]uy Garcia [address] [place] [address] [phone]

## 2021-02-11 NOTE — ASSESSMENT & PLAN NOTE
CT head: No acute intracranial abnormality  Microangiopathic changes  CTA of head and neck shows M3 occlusion   Not a candidate for TPA due to xeralto and trauma  · MRI of brain and echocardiogram pending  · Telemetry monitor   · NIH stroke scale   · Speech and swallow evaluation at bedside  · Neuro checks  · Aspirin 81 mg  · Statin 40 mg daily   · Hold xeralto for now  Continue heparin gtt per neurology     · PT/OT

## 2021-02-11 NOTE — PROGRESS NOTES
Progress Note - Tertiary Trauma [place]   Nelly Tuttle 81 y o  male MRN: [id number]  Unit/Bed#: ED 08 Encounter: [id number]    Assessment: No traumatic injuries, fall from standing, chronically anticoagulated with Xarelto, distal left M3 occlusion with resultant expressive aphasia    Clinical Plan:   patient admitted to medicine on 2/10 for CVA, w/ neurology consult  No traumatic injuries  Trauma will sign off    Prophylaxis: Heparin    Disposition: continue current level of care, defer to [facility] for further care    Code status:  Level 1 - Full Code    Consultants: neurology     Is the patient 65 years or older?: YES:    1  Before the illness or injury that brought you to the Emergency, did you need someone to help you on a regular basis? 1=Yes   2  Since the illness or injury that brought you to the Emergency, have you needed more help than usual to take care of yourself? 1=Yes   3  Have you been hospitalized for one or more nights during the past 6 months (excluding a stay in the Emergency Department)? 1=Yes   4  In general, do you see well? 0=Yes   5  In general, do you have serious problems with your memory? 0=No   6  Do you take more than three different medications everyday? 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, who presented as level B trauma for fall w/o LOC  CT imaging no traumatic findings  Noted to be aphasic, [facility] Sunday  Obtained CTA and consulted neurology, given ASA  CTA demonstrated left distal M3 occlusion  Patient admitted to medicine, stroke pathway       Active medications:           Current Facility-Administered Medications:     acetaminophen (TYLENOL) tablet 650 mg, 650 mg, Oral, Q4H PRN    aluminum-magnesium hydroxide-simethicone (MYLANTA) oral suspension 30 mL, 30 mL, Oral, Q6H PRN    atorvastatin (LIPITOR) tablet 40 mg, 40 mg, Oral, QPM    docusate sodium (COLACE) capsule 100 mg, 100 mg, Oral, BID, Stopped at 02/11/21 0802    heparin (porcine) 25,000 units in 0 45% NaCl 250 mL infusion (premix), 300-2,000 Units/hr, Intravenous, Titrated, 600 Units/hr at 02/11/21 0720    insulin lispro (HumaLOG) 100 units/mL subcutaneous injection 1-5 Units, 1-5 Units, Subcutaneous, TID AC **AND** Fingerstick Glucose (POCT), , , TID AC    multi-electrolyte (PLASMALYTE-A/ISOLYTE-S PH 7 4) IV solution, 100 mL/hr, Intravenous, Continuous, 100 mL/hr at 02/11/21 0703    ondansetron (ZOFRAN) injection 4 mg, 4 mg, Intravenous, Q6H PRN    senna (SENOKOT) tablet 8 6 mg, 1 tablet, Oral, Daily, Stopped at 02/11/21 0802    Current Outpatient Medications:     Xarelto 20 MG tablet      OBJECTIVE:     Vitals:   Vitals:    02/11/21 0845   BP: 135/69   Pulse: 96   Resp: 20   Temp:    SpO2: 95%       Physical Exam:   GENERAL APPEARANCE: NAD, resting comfortably in bed  NEURO: good sensation throughout, 5/5 motor strength throughout  Able to answer yes/no questions, otherwise expressive aphasia demonstrated  HEENT: NC/AT, no obvious signs of trauma  EOMs intact, pupils 1mm symmetric and sluggish  CV: RRR, No M/R/G, B/L DP/Radial Pulses 2+   LUNGS: CTAB, Chest rise equal B/L  ABD: NT/ND, BSx4  MSK: No signs of edema/ erythema noted  SKIN: No obvious signs of skin breakdown noted, warm/dry       I/O:   I/O     None          Invasive Devices: Invasive Devices     Peripheral Intravenous Line            Peripheral IV 02/10/21 Left Arm less than 1 day    Peripheral IV 02/10/21 Left Forearm less than 1 day                  Imaging:   Cta Head And Neck W Wo Contrast    Result Date: 2/10/2021  Impression: Left distal M2/and M3 occlusion   Multiple apical pulmonary nodules largest measuring 1 3 cm concerning for possible neoplasm  Based on current Fleischner Society 2017 Guidelines on incidental pulmonary nodule, follow-up nonemergent outpatient enhanced chest CT and/or PET/CT recommended for further evaluation  These could be neoplastic/metastatic, correlate clinically  I personally discussed this study with Sue Barba on 2/10/2021 at 4:37 PM  Workstation performed: [id number]     Ct Head Wo Contrast    Result Date: 2/10/2021  Impression: No evidence of acute vascular territorial infarction, intracranial hemorrhage or mass effect  Workstation performed: [id number]     Trauma - Ct Head Wo Contrast    Result Date: 2/10/2021  Impression: No acute intracranial abnormality  Microangiopathic changes  I personally discussed this study with Shashank Houston on 2/10/2021 at 2:50 PM  Workstation performed: [id number]     Trauma - Ct Spine Cervical Wo Contrast    Result Date: 2/10/2021  Impression: 1  No acute cervical spine fracture or traumatic malalignment  2   Multiple somewhat ill-defined biapical pulmonary nodules measuring up to 1 4 cm in the left upper lobe  While these may be postinflammatory/postinfectious in nature, underlying malignancy to be excluded  Recommend dedicated CT of the entire chest   Tube useful to compare to any prior studies the patient may have had  See above    I personally discussed this study with Shashank Houston on 2/10/2021 at 2:39 PM  Workstation performed: [id number]     Xr Trauma Multiple    Result Date: 2/10/2021  Impression: No acute traumatic injury No active cardiopulmonary disease  Workstation performed: [id number]       Labs:   CBC:   Lab Results   Component Value Date    WBC 8 63 02/11/2021    HGB 8 9 (L) 02/11/2021    HCT 28 3 (L) 02/11/2021     (H) 02/11/2021     02/11/2021    MCH 32 4 02/11/2021    MCHC 31 4 02/11/2021    RDW 13 2 02/11/2021    MPV 9 2 02/11/2021    NRBC 0 02/11/2021

## 2021-02-11 NOTE — ASSESSMENT & PLAN NOTE
New per family, he has word finding difficulty and expressive aphasia  · Continues with expressive aphasia   · CT head: "No acute intracranial abnormality    Microangiopathic changes "  · CTA head/neck: "Left distal M2/and M3 occlusion "  · Appreciate neurology input  · Continue stroke pathway   · Follow-up MRI brain and echocardiogram   · A1c, 5 8  · Lipid panel, total cholesterol 95, triglycerides 65, HDL 36, LDL 46  · Per neurology, continue heparin gtt  · If MRI confirms ischemia, will need to consider a different agent for anticoagulation as this would be considered Xarelto failure   · Likely Lovenox in the setting of malignancy   · Allow for permissive HTN  · Continue IVF   · PT/OT/ST   · Neuro checks

## 2021-02-11 NOTE — ASSESSMENT & PLAN NOTE
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

## 2021-02-12 ENCOUNTER — TELEPHONE (OUTPATIENT)
Dept: NEUROLOGY | Facility: CLINIC | Age: 86
End: 2021-02-12

## 2021-02-12 PROBLEM — I63.9 CVA (CEREBRAL VASCULAR ACCIDENT) (HCC): Status: ACTIVE | Noted: 2021-02-10

## 2021-02-12 LAB
ABO GROUP BLD: NORMAL
ALBUMIN SERPL BCP-MCNC: 3.4 G/DL (ref 3.5–5)
ALP SERPL-CCNC: 84 U/L (ref 46–116)
ALT SERPL W P-5'-P-CCNC: 24 U/L (ref 12–78)
ANION GAP SERPL CALCULATED.3IONS-SCNC: 10 MMOL/L (ref 4–13)
APTT PPP: 36 SECONDS (ref 23–37)
APTT PPP: 37 SECONDS (ref 23–37)
APTT PPP: 40 SECONDS (ref 23–37)
AST SERPL W P-5'-P-CCNC: 61 U/L (ref 5–45)
BACTERIA UR QL AUTO: ABNORMAL /HPF
BASOPHILS # BLD AUTO: 0.03 THOUSANDS/ΜL (ref 0–0.1)
BASOPHILS NFR BLD AUTO: 0 % (ref 0–1)
BILIRUB SERPL-MCNC: 0.54 MG/DL (ref 0.2–1)
BILIRUB UR QL STRIP: NEGATIVE
BUN SERPL-MCNC: 54 MG/DL (ref 5–25)
CALCIUM ALBUM COR SERPL-MCNC: 11.3 MG/DL (ref 8.3–10.1)
CALCIUM SERPL-MCNC: 10.8 MG/DL (ref 8.3–10.1)
CHLORIDE SERPL-SCNC: 108 MMOL/L (ref 100–108)
CLARITY UR: CLEAR
CO2 SERPL-SCNC: 22 MMOL/L (ref 21–32)
COLOR UR: YELLOW
CREAT SERPL-MCNC: 3.08 MG/DL (ref 0.6–1.3)
EOSINOPHIL # BLD AUTO: 0.05 THOUSAND/ΜL (ref 0–0.61)
EOSINOPHIL NFR BLD AUTO: 1 % (ref 0–6)
ERYTHROCYTE [DISTWIDTH] IN BLOOD BY AUTOMATED COUNT: 13.2 % (ref 11.6–15.1)
GFR SERPL CREATININE-BSD FRML MDRD: 18 ML/MIN/1.73SQ M
GLUCOSE SERPL-MCNC: 101 MG/DL (ref 65–140)
GLUCOSE SERPL-MCNC: 160 MG/DL (ref 65–140)
GLUCOSE SERPL-MCNC: 88 MG/DL (ref 65–140)
GLUCOSE SERPL-MCNC: 98 MG/DL (ref 65–140)
GLUCOSE UR STRIP-MCNC: NEGATIVE MG/DL
HCT VFR BLD AUTO: 35.6 % (ref 36.5–49.3)
HGB BLD-MCNC: 11.5 G/DL (ref 12–17)
HGB UR QL STRIP.AUTO: ABNORMAL
IMM GRANULOCYTES # BLD AUTO: 0.08 THOUSAND/UL (ref 0–0.2)
IMM GRANULOCYTES NFR BLD AUTO: 1 % (ref 0–2)
KETONES UR STRIP-MCNC: NEGATIVE MG/DL
LEUKOCYTE ESTERASE UR QL STRIP: NEGATIVE
LYMPHOCYTES # BLD AUTO: 0.78 THOUSANDS/ΜL (ref 0.6–4.47)
LYMPHOCYTES NFR BLD AUTO: 9 % (ref 14–44)
MAGNESIUM SERPL-MCNC: 2.1 MG/DL (ref 1.6–2.6)
MCH RBC QN AUTO: 32.5 PG (ref 26.8–34.3)
MCHC RBC AUTO-ENTMCNC: 32.3 G/DL (ref 31.4–37.4)
MCV RBC AUTO: 101 FL (ref 82–98)
MONOCYTES # BLD AUTO: 0.63 THOUSAND/ΜL (ref 0.17–1.22)
MONOCYTES NFR BLD AUTO: 7 % (ref 4–12)
NEUTROPHILS # BLD AUTO: 7.58 THOUSANDS/ΜL (ref 1.85–7.62)
NEUTS SEG NFR BLD AUTO: 82 % (ref 43–75)
NITRITE UR QL STRIP: NEGATIVE
NON-SQ EPI CELLS URNS QL MICRO: ABNORMAL /HPF
NRBC BLD AUTO-RTO: 0 /100 WBCS
PH UR STRIP.AUTO: 6 [PH]
PHOSPHATE SERPL-MCNC: 4 MG/DL (ref 2.3–4.1)
PLATELET # BLD AUTO: 214 THOUSANDS/UL (ref 149–390)
PMV BLD AUTO: 9.8 FL (ref 8.9–12.7)
POTASSIUM SERPL-SCNC: 4.1 MMOL/L (ref 3.5–5.3)
PROT SERPL-MCNC: 7.4 G/DL (ref 6.4–8.2)
PROT UR STRIP-MCNC: ABNORMAL MG/DL
RBC # BLD AUTO: 3.54 MILLION/UL (ref 3.88–5.62)
RBC #/AREA URNS AUTO: ABNORMAL /HPF
RH BLD: NEGATIVE
SODIUM SERPL-SCNC: 140 MMOL/L (ref 136–145)
SP GR UR STRIP.AUTO: 1.02 (ref 1–1.03)
UROBILINOGEN UR QL STRIP.AUTO: 0.2 E.U./DL
WBC # BLD AUTO: 9.15 THOUSAND/UL (ref 4.31–10.16)
WBC #/AREA URNS AUTO: ABNORMAL /HPF

## 2021-02-12 PROCEDURE — 80053 COMPREHEN METABOLIC PANEL: CPT | Performed by: NURSE PRACTITIONER

## 2021-02-12 PROCEDURE — 99233 SBSQ HOSP IP/OBS HIGH 50: CPT | Performed by: NURSE PRACTITIONER

## 2021-02-12 PROCEDURE — 85025 COMPLETE CBC W/AUTO DIFF WBC: CPT | Performed by: NURSE PRACTITIONER

## 2021-02-12 PROCEDURE — 97163 PT EVAL HIGH COMPLEX 45 MIN: CPT

## 2021-02-12 PROCEDURE — 84100 ASSAY OF PHOSPHORUS: CPT | Performed by: NURSE PRACTITIONER

## 2021-02-12 PROCEDURE — 85730 THROMBOPLASTIN TIME PARTIAL: CPT | Performed by: PHYSICIAN ASSISTANT

## 2021-02-12 PROCEDURE — 83735 ASSAY OF MAGNESIUM: CPT | Performed by: NURSE PRACTITIONER

## 2021-02-12 PROCEDURE — 99232 SBSQ HOSP IP/OBS MODERATE 35: CPT | Performed by: PHYSICIAN ASSISTANT

## 2021-02-12 PROCEDURE — 99223 1ST HOSP IP/OBS HIGH 75: CPT | Performed by: INTERNAL MEDICINE

## 2021-02-12 PROCEDURE — 85730 THROMBOPLASTIN TIME PARTIAL: CPT | Performed by: INTERNAL MEDICINE

## 2021-02-12 PROCEDURE — 81001 URINALYSIS AUTO W/SCOPE: CPT | Performed by: PHYSICIAN ASSISTANT

## 2021-02-12 PROCEDURE — 82948 REAGENT STRIP/BLOOD GLUCOSE: CPT

## 2021-02-12 PROCEDURE — 97167 OT EVAL HIGH COMPLEX 60 MIN: CPT

## 2021-02-12 RX ORDER — FUROSEMIDE 10 MG/ML
80 INJECTION INTRAMUSCULAR; INTRAVENOUS ONCE
Status: COMPLETED | OUTPATIENT
Start: 2021-02-12 | End: 2021-02-12

## 2021-02-12 RX ORDER — FUROSEMIDE 10 MG/ML
20 SYRINGE (ML) INJECTION CONTINUOUS
Status: DISCONTINUED | OUTPATIENT
Start: 2021-02-12 | End: 2021-02-16

## 2021-02-12 RX ADMIN — MAGNESIUM OXIDE TAB 400 MG (241.3 MG ELEMENTAL MG) 400 MG: 400 (241.3 MG) TAB at 08:55

## 2021-02-12 RX ADMIN — SENNOSIDES 8.6 MG: 8.6 TABLET, FILM COATED ORAL at 08:55

## 2021-02-12 RX ADMIN — Medication 20 MG/HR: at 15:33

## 2021-02-12 RX ADMIN — CARBIDOPA AND LEVODOPA 2.5 MG: 50; 200 TABLET, EXTENDED RELEASE ORAL at 13:29

## 2021-02-12 RX ADMIN — SODIUM CHLORIDE, SODIUM GLUCONATE, SODIUM ACETATE, POTASSIUM CHLORIDE, MAGNESIUM CHLORIDE, SODIUM PHOSPHATE, DIBASIC, AND POTASSIUM PHOSPHATE 100 ML/HR: .53; .5; .37; .037; .03; .012; .00082 INJECTION, SOLUTION INTRAVENOUS at 06:43

## 2021-02-12 RX ADMIN — ENOXAPARIN SODIUM 60 MG: 80 INJECTION SUBCUTANEOUS at 18:01

## 2021-02-12 RX ADMIN — SODIUM CHLORIDE, SODIUM GLUCONATE, SODIUM ACETATE, POTASSIUM CHLORIDE, MAGNESIUM CHLORIDE, SODIUM PHOSPHATE, DIBASIC, AND POTASSIUM PHOSPHATE 100 ML/HR: .53; .5; .37; .037; .03; .012; .00082 INJECTION, SOLUTION INTRAVENOUS at 16:33

## 2021-02-12 RX ADMIN — DOCUSATE SODIUM 100 MG: 100 CAPSULE, LIQUID FILLED ORAL at 08:55

## 2021-02-12 RX ADMIN — HEPARIN SODIUM 850 UNITS/HR: 10000 INJECTION, SOLUTION INTRAVENOUS at 11:18

## 2021-02-12 RX ADMIN — DOCUSATE SODIUM 100 MG: 100 CAPSULE, LIQUID FILLED ORAL at 16:32

## 2021-02-12 RX ADMIN — CARBIDOPA AND LEVODOPA 2.5 MG: 50; 200 TABLET, EXTENDED RELEASE ORAL at 06:34

## 2021-02-12 RX ADMIN — FUROSEMIDE 80 MG: 10 INJECTION, SOLUTION INTRAMUSCULAR; INTRAVENOUS at 14:09

## 2021-02-12 RX ADMIN — ATORVASTATIN CALCIUM 40 MG: 40 TABLET, FILM COATED ORAL at 16:32

## 2021-02-12 NOTE — CASE MANAGEMENT
Spoke with wife and discussed therapy recommendation for IP rehab  Discussed acute rehab and STR choices  She does not want to make a decision until she speaks with her   She is aware he is aphasic  Discussed with nurse to see if facetime could be done with wife    TCF wife, she would like referral to [facility] and [facility]    Referrals entered in [facility]    PMR consult requested

## 2021-02-12 NOTE — PLAN OF CARE
Problem: Prexisting or High Potential for Compromised Skin Integrity  Goal: Skin integrity is maintained or improved  Description: INTERVENTIONS:  - Identify patients at risk for skin breakdown  - Assess and monitor skin integrity  - Assess and monitor nutrition and hydration status  - Monitor labs   - Assess for incontinence   - Turn and reposition patient  - Assist with mobility/ambulation  - Relieve pressure over bony prominences  - Avoid friction and shearing  - Provide appropriate hygiene as needed including keeping skin clean and dry  - Evaluate need for skin moisturizer/barrier cream  - Collaborate with interdisciplinary team   - Patient/family teaching  - Consider wound care consult   Outcome: Progressing     Problem: PAIN - ADULT  Goal: Verbalizes/displays adequate comfort level or baseline comfort level  Description: Interventions:  - Encourage patient to monitor pain and request assistance  - Assess pain using appropriate pain scale  - Administer analgesics based on type and severity of pain and evaluate response  - Implement non-pharmacological measures as appropriate and evaluate response  - Consider cultural and social influences on pain and pain management  - Notify physician/advanced practitioner if interventions unsuccessful or patient reports new pain  Outcome: Progressing     Problem: INFECTION - ADULT  Goal: Absence or prevention of progression during hospitalization  Description: INTERVENTIONS:  - Assess and monitor for signs and symptoms of infection  - Monitor lab/diagnostic results  - Monitor all insertion sites, i e  indwelling lines, tubes, and drains  - Monitor endotracheal if appropriate and nasal secretions for changes in amount and color  - [place] appropriate cooling/warming therapies per order  - Administer medications as ordered  - Instruct and encourage patient and family to use good hand hygiene technique  - Identify and instruct in appropriate isolation precautions for identified infection/condition  Outcome: Progressing  Goal: Absence of fever/infection during neutropenic period  Description: INTERVENTIONS:  - Monitor WBC    Outcome: Progressing     Problem: SAFETY ADULT  Goal: Patient will remain free of falls  Description: INTERVENTIONS:  - Assess patient frequently for physical needs  -  Identify cognitive and physical deficits and behaviors that affect risk of falls    -  [place] fall precautions as indicated by assessment   - Educate patient/family on patient safety including physical limitations  - Instruct patient to call for assistance with activity based on assessment  - Modify environment to reduce risk of injury  - Consider OT/PT consult to assist with strengthening/mobility  Outcome: Progressing  Goal: Maintain or return to baseline ADL function  Description: INTERVENTIONS:  -  Assess patient's ability to carry out ADLs; assess patient's baseline for ADL function and identify physical deficits which impact ability to perform ADLs (bathing, care of mouth/teeth, toileting, grooming, dressing, etc )  - Assess/evaluate cause of self-care deficits   - Assess range of motion  - Assess patient's mobility; develop plan if impaired  - Assess patient's need for assistive devices and provide as appropriate  - Encourage maximum independence but intervene and supervise when necessary  - Involve family in performance of ADLs  - Assess for home care needs following discharge   - Consider OT consult to assist with ADL evaluation and planning for discharge  - Provide patient education as appropriate  Outcome: Progressing  Goal: Maintain or return mobility status to optimal level  Description: INTERVENTIONS:  - Assess patient's baseline mobility status (ambulation, transfers, stairs, etc )    - Identify cognitive and physical deficits and behaviors that affect mobility  - Identify mobility aids required to assist with transfers and/or ambulation (gait belt, sit-to-stand, lift, walker, cane, etc )  - [place] fall precautions as indicated by assessment  - Record patient progress and toleration of activity level on Mobility SBAR; progress patient to next Phase/Stage  - Instruct patient to call for assistance with activity based on assessment  - Consider rehabilitation consult to assist with strengthening/weightbearing, etc   Outcome: Progressing     Problem: DISCHARGE PLANNING  Goal: Discharge to home or other facility with appropriate resources  Description: INTERVENTIONS:  - Identify barriers to discharge w/patient and caregiver  - Arrange for needed discharge resources and transportation as appropriate  - Identify discharge learning needs (meds, wound care, etc )  - Arrange for interpretive services to assist at discharge as needed  - Refer to Case Management Department for coordinating discharge planning if the patient needs post-hospital services based on physician/advanced practitioner order or complex needs related to functional status, cognitive ability, or social support system  Outcome: Progressing     Problem: Knowledge Deficit  Goal: Patient/family/caregiver demonstrates understanding of disease process, treatment plan, medications, and discharge instructions  Description: Complete learning assessment and assess knowledge base  Interventions:  - Provide teaching at level of understanding  - Provide teaching via preferred learning methods  Outcome: Progressing     Problem: Neurological Deficit  Goal: Neurological status is stable or improving  Description: Interventions:  - Monitor and assess patient's level of consciousness, motor function, sensory function, and level of assistance needed for ADLs  - Monitor and report changes from baseline  Collaborate with interdisciplinary team to initiate plan and implement interventions as ordered  - Provide and maintain a safe environment  - Consider seizure precautions  - Consider fall precautions  - Consider aspiration precautions    - Consider bleeding precautions  Outcome: Progressing     Problem: Activity Intolerance/Impaired Mobility  Goal: Mobility/activity is maintained at optimum level for patient  Description: Interventions:  - Assess and monitor patient  barriers to mobility and need for assistive/adaptive devices  - Assess patient's emotional response to limitations  - Collaborate with interdisciplinary team and initiate plans and interventions as ordered  - Encourage independent activity per ability   - Maintain proper body alignment  - Perform active/passive rom as tolerated/ordered  - Plan activities to conserve energy   - Turn patient as appropriate  Outcome: Progressing     Problem: Communication Impairment  Goal: Ability to express needs and understand communication  Description: Assess patient's communication skills and ability to understand information  Patient will demonstrate use of effective communication techniques, alternative methods of communication and understanding even if not able to speak  - Encourage communication and provide alternate methods of communication as needed  - Collaborate with case management/ for discharge needs  - Include patient/family/caregiver in decisions related to communication  Outcome: Progressing     Problem: Potential for Aspiration  Goal: Non-ventilated patient's risk of aspiration is minimized  Description: Assess and monitor vital signs, respiratory status, and labs (WBC)  Monitor for signs of aspiration (tachypnea, cough, rales, wheezing, cyanosis, fever)  - Assess and monitor patient's ability to swallow  - Place patient up in chair to eat if possible  - HOB up at 90 degrees to eat if unable to get patient up into chair   - Supervise patient during oral intake  - Instruct patient/ family to take small bites  - Instruct patient/ family to take small single sips when taking liquids    - Follow patient-specific strategies generated by speech pathologist   Outcome: Progressing     Problem: Nutrition  Goal: Nutrition/Hydration status is improving  Description: Monitor and assess patient's nutrition/hydration status for malnutrition (ex- brittle hair, bruises, dry skin, pale skin and conjunctiva, muscle wasting, smooth red tongue, and disorientation)  Collaborate with interdisciplinary team and initiate plan and interventions as ordered  Monitor patient's weight and dietary intake as ordered or per policy  Utilize nutrition screening tool and intervene per policy  Determine patient's food preferences and provide high-protein, high-caloric foods as appropriate  - Assist patient with eating   - Allow adequate time for meals   - Encourage patient to take dietary supplement as ordered  - Collaborate with clinical nutritionist   - Include patient/family/caregiver in decisions related to nutrition    Outcome: Progressing

## 2021-02-12 NOTE — OCCUPATIONAL THERAPY NOTE
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)    Elevated serum creatinine    Past Medical History  No past medical history on file  Past Surgical History  No past surgical history on file  02/12/21 0910   OT Last Visit   OT Visit Date 02/12/21   Note Type   Note type Evaluation   Restrictions/Precautions   Weight Bearing Precautions Per Order No   Braces or Orthoses   (none)   Other Precautions Cognitive; Chair Alarm; Bed Alarm;Multiple lines; Fall Risk;Telemetry   Pain Assessment   Pain Assessment Tool 0-10   Pain Score No Pain   Home Living   Type of [address] [place] [address] Two level;Bed/bath upstairs;1/2 bath on main level;Stairs to enter with rails  (2STE through front and back doors, 1 KAILA through garage)   Bathroom Shower/Tub Tub/shower unit   Bathroom Toilet Standard   Bathroom Equipment Grab bars in [facility]; Other (Comment); Wheelchair-manual  (RW, standard walker)   Additional Comments Patient poor historian/difficulty w/ language at this time (expressive aphasia); called shell Luciano Luna and confirmed home setup and PLOF  Prior Function   Level of [place] Independent with ADLs and functional mobility; Needs assistance with IADLs   Lives With Spouse  (cannot provide physical A)   Receives Help From Family  (son [address] the [address] can provide daily checks)   ADL Assistance Independent   IADLs Needs assistance   Falls in the last 6 months 1 to 4   Vocational Retired   Comments Patient poor historian/difficulty w/ language at this time (expressive aphasia); called shell Luna and confirmed home setup and PLOF   Psychosocial   Psychosocial (WDL) X   Patient Behaviors/Mood Cooperative   ADL   Eating Assistance 5 Supervision/Setup  (level 3 nectar)   Grooming Assistance 4  Minimal Assistance   UB Bathing Assistance 4  Minimal Assistance   LB Bathing Assistance 2  Maximal Assistance   UB Dressing Assistance 3  Moderate Assistance   LB Dressing Assistance 2  Maximal [address]  2  Maximal Assistance   Bed Mobility   Supine to Sit 4  Minimal assistance   Additional items Assist x 1;HOB elevated; Increased time required;Verbal cues   Additional Comments supine on arrival, agreeable to OT session  seated OOB to chair w/ chair alarm on and all needs in reach at end of session   Transfers   Sit to Stand 3  Moderate assistance   Additional items Assist x 1; Increased time required;Verbal cues   Stand to Sit 3  Moderate assistance   Additional items Assist x 1; Increased time required;Verbal cues   Stand pivot 3  Moderate assistance   Additional items Assist x 1; Increased time required;Verbal cues   Balance   Static Sitting Fair   Dynamic Sitting Fair -   Static Standing Poor +   Dynamic Standing Poor +   Ambulatory Poor +   Activity Tolerance   Activity Tolerance Patient limited by fatigue   Medical Staff Made Aware PT and CM   Nurse Made Aware yes   RUE Assessment   RUE Assessment WFL   LUE Assessment   LUE Assessment WFL   Hand Function   Gross Motor Coordination Functional   Fine Motor Coordination Functional   Sensation   Light Touch No apparent deficits   Cognition   Overall Cognitive Status Impaired   Arousal/Participation Alert; Responsive; Cooperative   Attention Within functional limits   Orientation Level Oriented to person   Memory Unable to assess   Following Commands Follows one step commands with increased time or repetition   Comments very pleasant and cooperative throughout session  unable to formally assess orientation and memory due to communication deficits  patient presents with expressive aphasia   with increaesd time able to follow one step commands and gesturing (ie- stand up, sit up here patting to EOB) etc  recommend patient be alarmed while in the hospital to maximize independence and safety   Assessment   Limitation Decreased ADL status; Decreased UE strength;Decreased Safe judgement during ADL;Decreased cognition;Decreased endurance;Decreased self-care trans;Decreased high-level ADLs   Prognosis Good   Assessment Pt is a 81 y o  male seen for OT evaluation s/p admit to B on 2/10/2021 w/ CVA (cerebral vascular accident) ([address] )  Presenting with expressive aphasia and communication deficits  Comorbidities affecting pt's functional performance at time of assessment include: hx of falls, pulmonary emobli, malignant neoplasm of bladder mets to lung and elevated creatinine levels  Personal factors affecting pt at time of IE include:steps to enter environment, limited home support, difficulty performing ADLS, difficulty performing IADLS , level of education, limited insight into deficits, financial barriers, health management  and environment  Prior to admission, pt was independent with ADLs, and functional transfers and mobility without use of AD  Patient required assist with IADLs  Patient lives in a [facility] with 2nd floor bed/bath with his spouse  Patient with communication deficits at this time so therapist called son, Kim Ann to confirm home setup and PLOF  Patient's son reports patients spouse can provide limited amount of physical assistance at time of dc  Son lives locally and can check up on patient daily/as needed  Upon evaluation: UB ADLS completed with min to mod A, LB ADLS completed with max A  Patient required mod A x1 for bed mobility and functional transfers with use of RW   Pt presents with the following deficits impacting occupational performance: weakness, decreased strength, decreased balance, decreased tolerance, impaired attention, impaired initiation, impaired memory, impaired sequencing, impaired problem solving, impulsivity, decreased safety awareness, impaired interpersonal skills and decreased coping skills  Pt to benefit from continued skilled OT tx while in the hospital to address deficits as defined above and maximize level of functional independence w ADL's and functional mobility  Occupational Performance areas to address include: eating, grooming, bathing/shower, toilet hygiene, dressing, medication management, socialization, health maintenance, functional mobility, community mobility, clothing management and social participation  From OT standpoint, recommendation at time of d/c would be post acute rehab services  The patient's raw score on the AM-PAC Daily Activity inpatient short form is 15, standardized score is 34 69, less than 39 4  Patients at this level are likely to benefit from DC to post-acute rehabilitation services  Please refer to the recommendation of the Occupational Therapist for safe DC planning  Goals   Patient Goals none stated   Long Term Goal see below   Plan   Treatment Interventions ADL retraining;Functional transfer training;UE strengthening/ROM; Endurance training;Patient/family training;Cognitive reorientation;Equipment evaluation/education; Fine motor coordination activities; Compensatory technique education; Activityengagement; Energy conservation   Goal Expiration Date 02/24/21   OT Treatment Day 0   OT Frequency 3-5x/wk   Recommendation   OT Discharge Recommendation Post-Acute Rehabilitation Services   OT - OK to Discharge Yes  (to rehab when medically cleared)   AM-[facility] Daily Activity Inpatient   Lower Body Dressing 2   Bathing 2   Toileting 2   Upper Body Dressing 2   Grooming 3   Eating 4   Daily Activity Raw Score 15   Daily Activity Standardized Score (Calc for Raw Score >=11) 34 69   AM-PAC Applied Cognition Inpatient   Following a Speech/Presentation 1   Understanding Ordinary Conversation 1   Taking Medications 1   Remembering Where Things Are Placed or Put Away 1   Remembering List of 4-5 Errands 1   Taking Care of Complicated Tasks 1   Applied Cognition Raw Score 6   Applied Cognition Standardized Score 7 69   Barthel Index   Feeding 5   Bathing 0   Grooming Score 0   Dressing Score 5   Bladder Score 5  (condom cath)   Bowels Score 10   Toilet Use Score 5   Transfers (Bed/Chair) Score 5   Mobility (Level Surface) Score 0   Stairs Score 0   Barthel Index Score 35         Pt will achieve the following goals by d/c:    1  Pt will complete bed mobility with Mod I for seated ADLs EOB  2  Pt will complete UB ADLs with Mod I for inc'd independence with self care  3  Pt will complete LB ADLs with Mod I with/without adaptive techs  4  Patient will complete toileting with Mod I/I   5  Patient will increase functional/ADL transfers to Mod I level with G activity tolerance and balance  6  Continue to monitor functional cognition, safety and behaviors during I/ADL participation in efforts to decrease risk of falls/injury  7  Pt will demonstrate 30 minutes activity tolerance for ADLs  8  Pt will ID 2-3 EC techniques to utilize for inc'd performance with daily purposeful tasks  9  Pt will participate in therex HEP 1-3x/week for inc'd overall stamina/activity tolerance for purposeful tasks      Miroslava Frances MS, OTR/L

## 2021-02-12 NOTE — CONSULTS
2           Consultation - Nephrology   Cara Harrison 81 y o  male MRN: [id number]  [facility] Encounter: [id number]      Assessment/Plan     Assessment / Plan:  1  Renal    Patient has acute renal failure and it appears on presentation his creatinine was 1 6  His creatinine increased to 3 today and appears on 02/10/2021 he did receive IV contrast with a CTA  Given the abrupt rise in the creatinine I suspect that is due to contrast ATN  When I was in the room there was no urine but the nurse told me he was incontinent early your so they could not measure it and that she just placed a condom catheter on and he has not voided yet  I am worried that the patient is going to develop oliguria so will implement diuretic drip and monitor urine output  Lasix 80 mg IV followed by Lasix drip at 20 milligrams/hour  Monitor renal function and urine output    I have asked the nurse to call us at the end this shift to check on urine output  2  Neurological     The patient initially presented as a trauma but workup revealed that he has had a stroke  I personally reviewed the report on the MRI of the brain which reveals suggestion of embolic infarcts in the left MCA territory  There are other sites scattered in the right frontoparietal region and right cerebellar hemisphere  Neurology has been consulted to manage  PT/OT      History of Present Illness   Physician Requesting Consult: Marichuy Villalobos DO  Reason for Consult / Principal Problem:  Acute renal failure  Hx and PE limited by:   HPI: Cara Harrison is a 81y o  year old male who has a history of bladder cancer  Also had pulmonary embolism in the past   Apparently he fell at home was admitted as a trauma and was cleared by them  It was then noted that he had expressive aphasia and workup revealed that he did have a stroke  He received a CTA on 2/10/21    Subsequently his creatinine has increased to 3 today were asked to see him for acute renal failure  History obtained from chart review and the patient  Inpatient consult to Nephrology  Consult performed by: Guy Mares MD  Consult ordered by: Aileen Moody PA-C          Review of Systems   Constitutional: Positive for activity change  Negative for chills, diaphoresis and fever  HENT: Negative  Eyes: Negative  Respiratory: Negative  Negative for cough, chest tightness, shortness of breath and wheezing  Cardiovascular: Negative  Negative for chest pain, palpitations and leg swelling  Gastrointestinal: Negative  Negative for abdominal distention, diarrhea, nausea and vomiting  Genitourinary:        [place] catheter in place  Nurse told me he was incontinent earlier presently he has not voided since this was placed  Skin: Negative  Neurological: Negative for dizziness, seizures, light-headedness and headaches  Expressive speech difficulty  Psychiatric/Behavioral: Negative for agitation, confusion and hallucinations  The patient is not nervous/anxious and is not hyperactive  Historical Information   Patient Active Problem List   Diagnosis    Fall    [facility] [address] [facility]    [facility]monary [facility]oli [facility] [address] [facility]    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)    Elevated serum creatinine       pastmedical history     1  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   2  Pulmonary embolism   3  History of stroke      Past surgical history unknown  Social History  no current tobacco ethanol or drug abuse  Social History     Substance and Sexual Activity   Alcohol Use Not on file     Social History     Substance and Sexual Activity   Drug Use Not on file     Social History     Tobacco Use   Smoking Status Not on file     No family history on file  Family history was unable to be obtained from patient      Meds/Allergies   current meds:   Current Facility-Administered Medications   Medication Dose Route Frequency    acetaminophen (TYLENOL) tablet 650 mg  650 mg Oral Q4H PRN    aluminum-magnesium hydroxide-simethicone (MYLANTA) oral suspension 30 mL  30 mL Oral Q6H PRN    atorvastatin (LIPITOR) tablet 40 mg  40 mg Oral QPM    docusate sodium (COLACE) capsule 100 mg  100 mg Oral BID    furosemide (LASIX) 500 mg infusion 50 mL  20 mg/hr Intravenous Continuous    furosemide (LASIX) injection 80 mg  80 mg Intravenous Once    heparin (porcine) 25,000 units in 0 45% NaCl 250 mL infusion (premix)  300-2,000 Units/hr Intravenous Titrated    insulin lispro (HumaLOG) 100 units/mL subcutaneous injection 1-5 Units  1-5 Units Subcutaneous TID AC    magnesium oxide (MAG-OX) tablet 400 mg  400 mg Oral Daily    midodrine (PROAMATINE) tablet 2 5 mg  2 5 mg Oral BID AC    multi-electrolyte (PLASMALYTE-A/ISOLYTE-S PH 7 4) IV solution  100 mL/hr Intravenous Continuous    ondansetron (ZOFRAN) injection 4 mg  4 mg Intravenous Q6H PRN    senna (SENOKOT) tablet 8 6 mg  1 tablet Oral Daily     No Known Allergies    Objective     Intake/Output Summary (Last 24 hours) at 2/12/2021 1401  Last data filed at 2/12/2021 1301  Gross per 24 hour   Intake 1220 ml   Output 150 ml   Net 1070 ml     There is no height or weight on file to calculate BMI  Invasive Devices:        PHYSICAL EXAM:  /86   Pulse 90   Temp 97 7 °F (36 5 °C)   Resp 20   Wt 64 8 kg (142 lb 13 7 oz)   SpO2 96%     Physical Exam  Constitutional:       General: He is not in acute distress  Appearance: He is not toxic-appearing or diaphoretic  HENT:      Head: Atraumatic  Mouth/Throat:      Mouth: Mucous membranes are dry  Eyes:      General: No scleral icterus  Extraocular Movements: Extraocular movements intact  Neck:      Musculoskeletal: Normal range of motion and neck supple  Cardiovascular:      Rate and Rhythm: Normal rate and regular rhythm  Heart sounds: No friction rub  No gallop  Comments: No significant edema    Pulmonary:      Effort: Pulmonary effort is normal  No respiratory distress  Breath sounds: Normal breath sounds  No wheezing, rhonchi or rales  Abdominal:      General: Bowel sounds are normal  There is no distension  Palpations: Abdomen is soft  Tenderness: There is no abdominal tenderness  There is no rebound  Neurological:      Mental Status: He is alert  Comments: Expressive aphasia  He was able to lift both arms and both legs against gravity  Psychiatric:      Comments: Difficult to assess his thought processes you can tell he is trying to say something but the words 1 come out properly  Current Weight: Weight - Scale: 64 8 kg (142 lb 13 7 oz)  First Weight: Weight - Scale: 64 9 kg (143 lb 1 3 oz)    Lab Results:    Results from last 7 days   Lab Units 02/12/21  0510   WBC Thousand/uL 9 15   HEMOGLOBIN g/dL 11 5*   HEMATOCRIT % 35 6*   PLATELETS Thousands/uL 214     Results from last 7 days   Lab Units 02/12/21  0510  02/10/21  1403   POTASSIUM mmol/L 4 1   < >  --    CHLORIDE mmol/L 108   < >  --    CO2 mmol/L 22   < >  --    CO2, I-STAT mmol/L  --   --  28   BUN mg/dL 54*   < >  --    CREATININE mg/dL 3 08*   < >  --    GLUCOSE, ISTAT mg/dl  --   --  132   CALCIUM mg/dL 10 8*   < >  --     < > = values in this interval not displayed  Results from last 7 days   Lab Units 02/12/21  0510  02/10/21  1403   POTASSIUM mmol/L 4 1   < >  --    CHLORIDE mmol/L 108   < >  --    CO2 mmol/L 22   < >  --    CO2, I-STAT mmol/L  --   --  28   BUN mg/dL 54*   < >  --    CREATININE mg/dL 3 08*   < >  --    CALCIUM mg/dL 10 8*   < >  --    ALK PHOS U/L 84   < >  --    ALT U/L 24   < >  --    AST U/L 61*   < >  --    GLUCOSE, ISTAT mg/dl  --   --  132    < > = values in this interval not displayed

## 2021-02-12 NOTE — PROGRESS NOTES
Neurology Progress Note    Natalie Hummel 1935, 81 y o  male MRN: [id number]    Unit/Bed#: [facility] 904-01 Encounter: [id number]    Primary Care Provider: Chico Malik MD   Date and time admitted to hospital: 2/10/2021  1:55 PM        * CVA (cerebral vascular accident) [facility])  Assessment & Plan  81year old male with bladder cancer with known metastases to the lung currently enrolled in clinical trial at [facility], DVT/PE on [address] admitted after a fall and noted to have expressive aphasia  On 2/10/21 patient was noted to have worsening aphasia  Stat CTH completed and no acute findings noted  Neurology on call was contacted and recommended maintaining SBP >160 and <220  · MRI Brain w/wo contrast revealed Cluster of acute to early subacute embolic infarcts in the left MCA territory, within the inferior parietal temporal region  Few scattered embolic infarcts in the right frontoparietal region and right cerebellar hemisphere  Possible central/cardiac source of emboli  No evidence of CNS metastases  · Echocardiogram LVEF 70% no regional wall abnormalities, grade 1 diastolic dysfunction, R ventricle, R atrium mildly dilated, mitral valve with mild/mod anular calcification, trace MR, TR and pulmonic valve regurgitation  AV with mild stenosis and trace regurgitation  · HgA1c 5 8, LDL 46, HDL 36, triglycerides  Plan:   - Continue on Heparin gtt at this time, PTT 37 this AM  Recommend therapeutic range 60-80 range per protocol    - Attending Neurologist and primary team discussed plan for anticoagulation  Initially recommended therapeutic Lovenox given suspected hypercoagulable state related to metastatic cancer, however patient currently with [facility]  Please see Attending Neurologist Attestation for further recommendations   - Continue on atorvastatin 40 mg QPM     - Goal normothermia, euglycemia      - Allow for permissive hypertension, BP goal -160 mmHg, avoid hypotension    - PT/OT/Speech therapy  - Stroke education     - Monitor exam and notify with changes  Imaging reviewed:  - 2/10/21 CTA head and neck:   · Left distal M2/and M3 occlusion  Imaging reviewed by Neurosurgeon Dr Sarah Bridges, no occlusion noted by him   · Multiple apical pulmonary nodules largest measuring 1 3 cm concerning for possible neoplasm  Based on current Fleischner Society 2017 Guidelines on incidental pulmonary nodule, follow-up nonemergent outpatient enhanced chest CT and/or PET/CT recommended for further evaluation  These could be neoplastic/metastatic, correlate clinically  - 2/10/21 CT head wo contrast: No evidence of acute vascular territorial infarction, intracranial hemorrhage or mass effect  - 2/10/21 CT cervical spine:   · No acute cervical spine fracture or traumatic malalignment  Multiple somewhat ill-defined biapical pulmonary nodules measuring up to 1 4 cm in the left upper lobe  While these may be postinflammatory/postinfectious in nature, underlying malignancy to be excluded  Recommend dedicated CT of the entire chest    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 [facility])  Assessment & Plan   - Patient follows with [facility] as an outpatient and is involved with clinical trial receiving atezolizumab  - PA-C David Alcantar reached out at patient's wife request to discuss case and plan of care with patient's oncologist, Dr Irena Bains and awaiting a return phone call  Today, reached out to Dr DOZIER [facility] office, left message requesting a return call  Pulmonary emboli (HCC)  Assessment & Plan   - Maintained currently on Heparin gtt  PTT 37 this AM        Fall  Assessment & Plan   - Trauma signed off this AM, no traumatic injuries noted  Awilda Granados will need follow up in in 6 weeks with neurovascular attending or advance practitioner  He will not require outpatient neurological testing          Subjective:   Patient continues with mixed aphasia (expressive > receptive)  Answers yes to all questions even when given choices for location  Follows some simple commands with visual cueing  12 point Review of Systems - unable to assess due to aphasia      Vitals: Blood pressure 156/83, pulse 101, temperature 98 2 °F (36 8 °C), resp  rate 18, weight 64 8 kg (142 lb 13 7 oz), SpO2 96 %  ,There is no height or weight on file to calculate BMI  MEDS:  Current Facility-Administered Medications   Medication Dose Route Frequency Provider Last Rate    acetaminophen  650 mg Oral Q4H PRN Mile Encarnacion MD      aluminum-magnesium hydroxide-simethicone  30 mL Oral Q6H PRN Mile Encarnacion MD      atorvastatin  40 mg Oral QPM Mile Encarnacion MD      docusate sodium  100 mg Oral BID Mile Encarnacion MD      heparin (porcine)  300-2,000 Units/hr Intravenous Titrated Chadwick PA-C 750 Units/hr (02/12/21 0327)    insulin lispro  1-5 Units Subcutaneous TID AC Mile Encarnacion MD      magnesium oxide  400 mg Oral Daily MILAGRO Miles      midodrine  2 5 mg Oral BID AC ThomESTHER Dang-MARIANNE      multi-electrolyte  100 mL/hr Intravenous Continuous Chadwick PA-C 100 mL/hr (02/12/21 1012)    ondansetron  4 mg Intravenous Q6H PRN Mile Encarnacion MD      senna  1 tablet Oral Daily Mile Encarnacion MD     :          Physical Exam  Vitals signs reviewed  Constitutional:       Appearance: He is ill-appearing  HENT:      Head: Normocephalic  Mouth/Throat:      Mouth: Mucous membranes are moist    Eyes:      Extraocular Movements: Extraocular movements intact and EOM normal       Pupils: Pupils are equal, round, and reactive to light  Cardiovascular:      Rate and Rhythm: Normal rate and regular rhythm  Pulses: Normal pulses  Heart sounds: No murmur  Pulmonary:      Effort: Pulmonary effort is normal       Breath sounds: Normal breath sounds  Abdominal:      General: Bowel sounds are normal       Palpations: Abdomen is soft  Neurologic Exam     Mental Status   Level of consciousness: alert    Continues with expressive and receptive aphasia  Alert, answers yes to questions  Difficulty following verbal commands, able to follow some simple commands with visual cue  Cranial Nerves     CN II   Visual acuity: (blinks to threat)    CN III, IV, VI   Pupils are equal, round, and reactive to light  Extraocular motions are normal    Right pupil: Size: 2 mm  Left pupil: Size: 2 mm  Face appears symmetric at rest and with movement     Motor Exam   Muscle bulk: normal  Overall muscle tone: normal  Exam limited due to aphasia  Moves all extremities spontaneously and to gravity  Lab Results:   I have personally reviewed pertinent reports  CBC:   Results from last 7 days   Lab Units 02/12/21  0510 02/11/21  0554 02/10/21  2334   WBC Thousand/uL 9 15 8 63 10 65*   RBC Million/uL 3 54* 2 75* 3 29*   HEMOGLOBIN g/dL 11 5* 8 9* 10 7*   HEMATOCRIT % 35 6* 28 3* 32 7*   MCV fL 101* 103* 99*   PLATELETS Thousands/uL 214 152 190   BMP/CMP:   Results from last 7 days   Lab Units 02/12/21  0510 02/11/21  0554 02/10/21  1407 02/10/21  1403   SODIUM mmol/L 140 141 137  --    POTASSIUM mmol/L 4 1 3 5 4 1  --    CHLORIDE mmol/L 108 112* 105  --    CO2 mmol/L 22 19* 25  --    CO2, I-STAT mmol/L  --   --   --  28   BUN mg/dL 54* 42* 44*  --    CREATININE mg/dL 3 08* 1 91* 1 68*  --    GLUCOSE, ISTAT mg/dl  --   --   --  132   CALCIUM mg/dL 10 8* 9 2 10 7*  --    AST U/L 61* 45  --   --    ALT U/L 24 20  --   --    ALK PHOS U/L 84 68  --   --    EGFR ml/min/1 73sq m 18 31 36  --     HgBA1C:   Results from last 7 days   Lab Units 02/11/21  0554   HEMOGLOBIN A1C % 5 8*   Coagulation:   Results from last 7 days   Lab Units 02/11/21  0026   INR  1 34*   Lipid Profile:   Results from last 7 days   Lab Units 02/11/21  0554   HDL mg/dL 36*   LDL CALC mg/dL 46   TRIGLYCERIDES mg/dL 65     Imaging Studies: I have personally reviewed pertinent reports  and I have personally reviewed pertinent films in PACS     EEG, Pathology, and Other Studies: I have personally reviewed pertinent reports  and I have personally reviewed pertinent films in PACS    VTE Prophylaxis: Sequential compression device (Venodyne)  and Heparin     Counseling / Coordination of Care  Total time spent today 25 minutes  Greater than 50% of total time was spent with the patient and / or family counseling and / or coordination of care   A description of the counseling / coordination of care: Coordination of care with SLIM PA-C

## 2021-02-12 NOTE — PLAN OF CARE
Problem: PHYSICAL THERAPY ADULT  Goal: Performs mobility at highest level of function for planned discharge setting  See evaluation for individualized goals  Description: Treatment/Interventions: Functional transfer training, LE strengthening/ROM, Elevations, Therapeutic exercise, Endurance training, Patient/family training, Equipment eval/education, Bed mobility, Gait training, Spoke to nursing  Equipment Recommended: Akil Peters       See flowsheet documentation for full assessment, interventions and recommendations  Note: Prognosis: Good  Problem List: Decreased strength, Decreased endurance, Impaired balance, Decreased mobility, Decreased cognition, Impaired judgement, Decreased safety awareness  Assessment: Pt is 81 y o  male seen for PT evaluation s/p admit to [facility] on 2/10/2021 w/ CVA (cerebral vascular accident) ([facility] [address] )  Pt presents from home s/p fall  Pt noticed to have expressive aphasia  PT consulted to assess pt's functional mobility and d/c needs  Order placed for PT eval and tx, w/ up w/ A order  Comorbidities affecting pt's physical performance at time of assessment include: fall, CVA, hx of PE, malignant neoplasm of bladder, metastatics to lung  Pt aphasic- all information obtained from son  Pt resides in [facility] w/ 2 KAILA and FF to bed/bath on 2nd floor  Pt resides w/ spouse who cannot provide assistance  Pt was IND w/ ADLs PTA  Personal factors affecting pt at time of IE include: inaccessible home environment, lives in two story house, stairs to enter home, inability to navigate community distances, decreased cognition, positive fall history and inability to perform IADLs   Please find objective findings from PT assessment regarding body systems outlined above with impairments and limitations including weakness, impaired balance, decreased endurance, gait deviations, decreased activity tolerance, decreased functional mobility tolerance, decreased safety awareness, impaired judgement, fall risk and decreased cognition  Pt performed bed mobility at [address] [place] [address]  Pt performed STS at [facility]  Pt ambulated 3ft w/ HHA at [address] [place] [address]  The following objective measures performed on IE also reveal limitations: AM-PAC 6-Clicks: 92/85  Pt's clinical presentation is currently unstable/unpredictable seen in pt's presentation of recent admission for CVA requiring medical attention, recent decline in function as compared to baseline, multiple lines, fall risk, cognitive deficits  Pt to benefit from continued PT tx to address deficits as defined above and maximize level of functional independent mobility and consistency  From PT/mobility standpoint, recommendation at time of d/c would be STR pending progress in order to facilitate return to PLOF  Barriers to Discharge: Decreased caregiver support, Inaccessible home environment     PT Discharge Recommendation: [address]     PT - OK to Discharge: Yes(when medically cleared to rehab)    See flowsheet documentation for full assessment

## 2021-02-12 NOTE — CASE MANAGEMENT
Spoke with wife, explained CM program/CM role  LOS-1  Bundle-no  Unplanned readmission color-green  30 day readmission-no  I PTA for ADL's/ambulation, drives, retired  Resides [address] [facility] with wife, 2 KAILA  PCP MARIANNE [employer], MARICARMEN Banerjee  Denies HHC/DME, IP Rehab  Denies MH illness, D&A abuse  Primary contact is wife Sukhjinder Paulino, [phone]  States pt has POA/LW, requested she provide copy to PCP to upload in medical record  Wife will transport home    CM reviewed d/c planning process including the following: identifying help at home, patient preference for d/c planning needs, Discharge Lounge, [facility] Meds to Bed program, availability of treatment team to discuss questions or concerns patient and/or family may have regarding understanding medications and recognizing signs and symptoms once discharged  CM also encouraged patient to follow up with all recommended appointments after discharge  Patient advised of importance for patient and family to participate in managing patients medical well being  Patient/caregiver received discharge checklist  Content reviewed  Patient/caregiver encouraged to participate in discharge plan of care prior to discharge home

## 2021-02-12 NOTE — TELEPHONE ENCOUNTER
Reviewed patient chart, inpatient at [facility] at this time  Admitted under stroke pathway  Inpatient therapy recommendations are pending at this time  Per inpatient neology, as patient continues to struggle with expressive aphasia and cognitive deficit, discharge planning is beeing coordinated with wife Todd Carolina  Called wife, I introduced myself and explained neurovascular nurse navigator role  We discussed follow up care, stroke education, and discharge planning  We reviewed stroke type, symptoms, personal risk factors and management, medications, and resources  She verbalizes understanding of teaching  Offered to send stroke education binder and my card in the mail, wife is agreeable to this  Verified mailing address as  [address] in [facility], then mailed the information as requested  I included CareNotes with expressive aphsia exercises with this information  Informed wife I will continue to follow up during hospitalization with inpatient neurology recommendations as well as after hospital discharge to assist  She verbalizes understanding and is agreeable to plan  I addressed all his questions  At the conclusion of the conversation, Todd Carolina denies having any further questions or concerns

## 2021-02-12 NOTE — PROGRESS NOTES
Progress Note - Isabelle Friend 1935, 81 y o  male MRN: [id number]    Unit/Bed#: [facility] 904-01 Encounter: [id number]    Primary Care Provider: Ari Arenas MD   Date and time admitted to hospital: 2/10/2021  1:55 PM        * CVA (cerebral vascular accident) [facility])  Assessment & Plan  · Per record review, on 2/10/21, patient was noted to be expressively aphasic, last known well at noon that day, with aphasia noted right after a fall  · Did not receive tPA due to Xarelto use  · Initial CT head with microangiopathic changes, no acute intracranial abnormality  CTA head/neck with left distal M2 and M3 occlusion however no occlusion was noted by Neurosurgeon per Neurology records  Repeat CT head from 2/10/21 due to worsening aphasia with no evidence of acute vascular territorial infarction, intracranial hemorrhage or mass effect  · MRI brain revealed cluster of acute to early subacute embolic infarcts in the left MCA territory, within the inferior parietal temporal region  Few scattered embolic infarcts in the frontoparietal region and right cerebellar hemisphere  Possible central/cardiac source  · Per Neurology, discontinue heparin GTT (PTT was not therapeutic) and switch to Lovenox (1mg/kg Q24hr, dosing per Neuro/Nephro/Pharmacy)  Neurology has been attempting to reach patient's Oncology team   · Continue goal permissive hypertension per Neuro, -160  Patient on midodrine per Neuro      VELVET (acute kidney injury) ([facility] [address] )  Assessment & Plan  · Creatinine 3 08 today, up from 1 91 yesterday, and baseline appears to be around 1 0  · Patient had CTA as above  · Nephrology consulted  · Lovenox renally dosed as above   · Nephrology gave 80mg IV Lasix followed by Lasix GTT  · Avoid hypotension  Patient on midodrine per Neuro as above  · BMP monitoring     Pulmonary emboli [facility])  Assessment & Plan  · Known history   Patient was on Xarelto   · Xarelto was held on admission and patient was initially placed on heparin GTT  Now on Lovenox 1mg/kg Q24hr as above    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 [facility])  Assessment & Plan  · Patient with known urothelial carcinoma, muscle invasive, 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  Follows at [facility]  · CTA noted:  Multiple somewhat ill-defined biapical pulmonary nodules measuring up to 1 4 cm in the left upper lobe  While these may be postinflammatory/postinfectious in nature, underlying malignancy to be excluded  Fall  Assessment & Plan  · Appreciate trauma input, they have since signed off  · Continue PT/OT   · Fall precautions     Elevated serum creatinine  Assessment & Plan  POA, Cr 1 68, unclear baseline   Cr today: 1 91  · Started Isolyte 100 mL/hr   · Avoid nephrotoxic agents and relative hypotension   · Monitor Cr in am       VTE Pharmacologic Prophylaxis:   Pharmacologic: Enoxaparin (Lovenox)  Mechanical VTE Prophylaxis in Place: Yes    Patient Centered Rounds: I have performed bedside rounds with nursing staff today  Discussions with Specialists or Other Care Team Provider: Dr Lesia Piedra, Dr Bina Vanessa    Education and Discussions with Family / Patient: patient and his wife     Time Spent for Care: 30 minutes  More than 50% of total time spent on counseling and coordination of care as described above      Current Length of Stay: 2 day(s)    Current Patient Status: Inpatient   Certification Statement: The patient will continue to require additional inpatient hospital stay due to not medically stable for d/c as above    Discharge Plan: not medically stable for d/c as above     Code Status: Level 1 - Full Code      Subjective:   Mr Blessing Jimenez is expressively aphasic     Objective:     Vitals:   Temp (24hrs), Av 2 °F (36 8 °C), Min:97 7 °F (36 5 °C), Max:98 6 °F (37 °C)    Temp:  [97 7 °F (36 5 °C)-98 6 °F (37 °C)] 98 3 °F (36 8 °C)  HR:  [] 86  Resp:  [18-20] 18  BP: (124-159)/(72-87) 159/87  SpO2:  [96 %-98 %] 96 %  Body mass index is 20 5 kg/m²  Input and Output Summary (last 24 hours): Intake/Output Summary (Last 24 hours) at 2/12/2021 1818  Last data filed at 2/12/2021 1632  Gross per 24 hour   Intake 2441 16 ml   Output 403 ml   Net 2038 16 ml       Physical Exam:     Physical Exam  Vitals signs and nursing note reviewed  Constitutional:       Comments: Patient seen lying in bed earlier today comfortably resting  NAD   Cardiovascular:      Rate and Rhythm: Normal rate and regular rhythm  Pulmonary:      Effort: Pulmonary effort is normal       Breath sounds: Normal breath sounds  Abdominal:      General: Bowel sounds are normal       Palpations: Abdomen is soft  Tenderness: There is no abdominal tenderness  Musculoskeletal:      Right lower leg: No edema  Left lower leg: No edema  Skin:     General: Skin is warm  Neurological:      Comments: Expressive aphasia  Moves all 4 extremities    Psychiatric:         Mood and Affect: Mood normal          Behavior: Behavior normal        Additional Data:     Labs:    Results from last 7 days   Lab Units 02/12/21  0510   WBC Thousand/uL 9 15   HEMOGLOBIN g/dL 11 5*   HEMATOCRIT % 35 6*   PLATELETS Thousands/uL 214   NEUTROS PCT % 82*   LYMPHS PCT % 9*   MONOS PCT % 7   EOS PCT % 1     Results from last 7 days   Lab Units 02/12/21  0510   SODIUM mmol/L 140   POTASSIUM mmol/L 4 1   CHLORIDE mmol/L 108   CO2 mmol/L 22   BUN mg/dL 54*   CREATININE mg/dL 3 08*   ANION GAP mmol/L 10   CALCIUM mg/dL 10 8*   ALBUMIN g/dL 3 4*   TOTAL BILIRUBIN mg/dL 0 54   ALK PHOS U/L 84   ALT U/L 24   AST U/L 61*   GLUCOSE RANDOM mg/dL 88     Results from last 7 days   Lab Units 02/11/21  0026   INR  1 34*     Results from last 7 days   Lab Units 02/12/21  1614 02/12/21  1117 02/12/21  0758 02/11/21  1824 02/11/21  1040   POC GLUCOSE mg/dl 101 160* 98 87 114     Results from last 7 days   Lab Units 02/11/21  0554   HEMOGLOBIN A1C % 5 8*               * I Have Reviewed All Lab Data Listed Above    * Additional Pertinent Lab Tests Reviewed: All Labs Within Last 24 Hours Reviewed    Imaging:    Imaging Reports Reviewed Today Include: CTs and MRI as above  Imaging Personally Reviewed by Myself Includes:  none    Recent Cultures (last 7 days):           Last 24 Hours Medication List:   Current Facility-Administered Medications   Medication Dose Route Frequency Provider Last Rate    acetaminophen  650 mg Oral Q4H PRN Gopal Stephenson MD      aluminum-magnesium hydroxide-simethicone  30 mL Oral Q6H PRN Gopal Stephenson MD      atorvastatin  40 mg Oral QPM Gopal Stephenson MD      docusate sodium  100 mg Oral BID Gopal Stephenson MD      enoxaparin  1 mg/kg Subcutaneous Q24H [address] Wilhelmena Nyhan, PA-C      furosemide  20 mg/hr Intravenous Continuous Gaile Remedies, MD 20 mg/hr (02/12/21 1533)    insulin lispro  1-5 Units Subcutaneous TID AC Gopal Stephenson MD      magnesium oxide  400 mg Oral Daily MILAGRO Lewis      midodrine  2 5 mg Oral BID AC Mark Curtis PA-C      multi-electrolyte  100 mL/hr Intravenous Continuous CambMARIA EUGENIA campbell 100 mL/hr (02/12/21 1633)    ondansetron  4 mg Intravenous Q6H PRN Gopal Stephenson MD      senna  1 tablet Oral Daily Gopal Stephenson MD          Today, Patient Was Seen By: Wilhelmena Nyhan, PA-C    ** Please Note: Dictation voice to text software may have been used in the creation of this document   **

## 2021-02-12 NOTE — PLAN OF CARE
Problem: OCCUPATIONAL THERAPY ADULT  Goal: Performs self-care activities at highest level of function for planned discharge setting  See evaluation for individualized goals  Description: Treatment Interventions: ADL retraining, Functional transfer training, UE strengthening/ROM, Endurance training, Patient/family training, Cognitive reorientation, Equipment evaluation/education, Fine motor coordination activities, Compensatory technique education, Activityengagement, Energy conservation          See flowsheet documentation for full assessment, interventions and recommendations     Note: Limitation: Decreased ADL status, Decreased UE strength, Decreased Safe judgement during ADL, Decreased cognition, Decreased endurance, Decreased self-care trans, Decreased high-level ADLs  Prognosis: Good  Assessment: see note     OT Discharge Recommendation: Post-Acute Rehabilitation Services  OT - OK to Discharge: Yes(to rehab when medically cleared)

## 2021-02-12 NOTE — PHYSICAL THERAPY NOTE
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 ([facility] [address] )    Elevated serum creatinine       Past Medical History  No past medical history on file  Past Surgical History  No past surgical history on file  02/12/21 0911   PT Last Visit   PT Visit Date 02/12/21   Note Type   Note type Evaluation   Pain Assessment   Pain Assessment Tool 0-10   Pain Score No Pain   Home Living   Type of [address] Two level;Bed/bath upstairs;Stairs to enter with rails   Home Equipment   (denies DME)   Additional Comments Pt is aphasic- spoke w/ son on the phone  Per son, pt resides in [facility] w/ 2 KAILA and FF to bed/bath on 2nd floor  Pt was ambulating w/out AD PTA   Prior Function   Level of [place] Independent with ADLs and functional mobility   Lives With Spouse   Receives Help From Family   ADL Assistance Independent   IADLs Needs assistance   Falls in the last 6 months 1 to 4  (3, per son)   Comments Pt's son reports spouse cannot provide any physical assistance  Pt's son lives very close but works during the day   Restrictions/Precautions   [place] Bearing Precautions Per Order No   Other Precautions Cognitive; Chair Alarm; Bed Alarm;Multiple lines; Fall Risk;Telemetry   General   Family/Caregiver Present No   Cognition   Overall Cognitive Status Impaired   Arousal/Participation Alert   Orientation Level Oriented to person   Following Commands Follows one step commands with increased time or repetition   Comments Pt very pleasant and cooperative to work w/ therapy   Pt presents w/ aphasia   RLE Assessment   RLE Assessment   (grossly 4/5)   LLE Assessment   LLE Assessment (grossly 4/5)   Coordination   Movements are Fluid and Coordinated 1   Bed Mobility   Supine to Sit 4  Minimal assistance   Additional items Assist x 1;HOB elevated; Increased time required;Verbal cues   Sit to Supine Unable to assess   Additional Comments Pt lying supine upon PT arrival  Pt returned seated OOB at end of session w/ all needs within reach and chair alarm activated   Transfers   Sit to Stand 3  Moderate assistance   Additional items Assist x 1; Increased time required;Verbal cues   Stand to Sit 3  Moderate assistance   Additional items Assist x 1; Increased time required;Verbal cues   Additional Comments Transfers w/ HHA   Ambulation/Elevation   Gait pattern Excessively slow; Short stride; Inconsistent pebbles;Decreased foot clearance;Shuffling   Gait Assistance 4  Minimal assist   Additional items Assist x 1;Verbal cues   Assistive Device   (HHA)   Distance 3ft to chair   Stair Management Assistance Not tested   Balance   Static Sitting Fair   Dynamic Sitting Fair -   Static Standing Poor +   Dynamic Standing Poor +   Ambulatory Poor +   Endurance Deficit   Endurance Deficit Yes   Endurance Deficit Description weakness, fatigue   Activity Tolerance   Activity Tolerance Patient limited by fatigue   Medical Staff Made Aware OT Denae   Nurse Made Aware yes   Assessment   Prognosis Good   Problem List Decreased strength;Decreased endurance; Impaired balance;Decreased mobility; Decreased cognition; Impaired judgement;Decreased safety awareness   Assessment Pt is 81 y o  male seen for PT evaluation s/p admit to [facility] on 2/10/2021 w/ CVA (cerebral vascular accident) ([facility] [address] )  Pt presents from home s/p fall  Pt noticed to have expressive aphasia  PT consulted to assess pt's functional mobility and d/c needs  Order placed for PT eval and tx, w/ up w/ A order   Comorbidities affecting pt's physical performance at time of assessment include: fall, CVA, hx of PE, malignant neoplasm of bladder, metastatics to lung  Pt aphasic- all information obtained from son  Pt resides in [facility] w/ 2 KAILA and FF to bed/bath on 2nd floor  Pt resides w/ spouse who cannot provide assistance  Pt was IND w/ ADLs PTA  Personal factors affecting pt at time of IE include: inaccessible home environment, lives in two story house, stairs to enter home, inability to navigate community distances, decreased cognition, positive fall history and inability to perform IADLs  Please find objective findings from PT assessment regarding body systems outlined above with impairments and limitations including weakness, impaired balance, decreased endurance, gait deviations, decreased activity tolerance, decreased functional mobility tolerance, decreased safety awareness, impaired judgement, fall risk and decreased cognition  Pt performed bed mobility at [address] [place] [address]  Pt performed STS at Mod A  Pt ambulated 3ft w/ HHA at [address] [place] [address]  The following objective measures performed on IE also reveal limitations: AM-PAC 6-Clicks: 58/58  Pt's clinical presentation is currently unstable/unpredictable seen in pt's presentation of recent admission for CVA requiring medical attention, recent decline in function as compared to baseline, multiple lines, fall risk, cognitive deficits  Pt to benefit from continued PT tx to address deficits as defined above and maximize level of functional independent mobility and consistency  From PT/mobility standpoint, recommendation at time of d/c would be STR pending progress in order to facilitate return to PLOF  Barriers to Discharge Decreased caregiver support; Inaccessible home environment   Goals   Patient Goals none stated 2' aphasia   STG Expiration Date 02/26/21   Short Term Goal #1 1  Pt will demonstrate the ability to perform all aspects of bed mobility at Mod I in onder to maximize functional independence and decrease burden on caregivers   2 Pt will demonstrate the ability to perform all functional transfers at Mod I in order to maximize functional independence and decrease burden on caregivers  3 Pt will demonstrate the ability to ambulate at least 150ft with least restrictive device at Mod I in order to maximize functional independence  4 Pt will demonstrate the ability to negotiate 12 steps at Mod I in order to return to household/community mobility  5 Pt will demonstrate improved balance by one grade in order to decrease risk of falls  6 Pt will increase b/l LE strength by 1 grade in order to increase ease of functional mobility and transfers   PT Treatment Day 0   Plan   Treatment/Interventions Functional transfer training;LE strengthening/ROM; Elevations; Therapeutic exercise; Endurance training;Patient/family training;Equipment eval/education; Bed mobility;Gait training;Spoke to nursing   PT Frequency   (3-5x/week)   Recommendation   PT Discharge Recommendation Post-Acute Rehabilitation Services   Equipment Recommended Walker   PT - OK to Discharge Yes  (when medically cleared to rehab)   [place] 8 in Bed Without Bedrails 3   Lying on Back to Sitting on Edge of Flat Bed 3   Moving Bed to Chair 3   Standing Up From Chair 2   Walk in Room 3   Climb 3-5 Stairs 2   Basic Mobility Inpatient Raw Score 16   Basic Mobility Standardized Score 38 32   Modified Minesh Scale   Modified M[place]h Scale 4   The patient's AM-PAC Basic Mobility Inpatient Short Form Raw Score is 16, Standardized Score is 38 32  A standardized score less than 42 9 suggests the patient may benefit from discharge to post-acute rehabilitation services  Please also refer to the recommendation of the Physical Therapist for safe discharge planning      Redd Seth, PT, DPT

## 2021-02-12 NOTE — ASSESSMENT & PLAN NOTE
· Creatinine 3 08 today, up from 1 91 yesterday, and baseline appears to be around 1 0  · Patient had CTA as above  · Nephrology consulted  · Lovenox renally dosed as above   · Nephrology gave 80mg IV Lasix followed by Lasix GTT  · Avoid hypotension   Patient on midodrine per Neuro as above  · BMP monitoring

## 2021-02-12 NOTE — ED PROVIDER NOTES
Emergency Department Airway Evaluation and Management Form    History  Obtained from: EMS  Patient has no known allergies  Chief Complaint   Patient presents with    Fall     fall backwards with + head strike     This is a 81 y o  old male who presents to the ED for evaluation of fall  Hit head, on AP  Noted to have speech changes  Unk LOC  No past medical history on file  No past surgical history on file  No family history on file  Social History     Tobacco Use    Smoking status: Not on file   Substance Use Topics    Alcohol use: Not on file    Drug use: Not on file     I have reviewed and agree with the history as documented      Review of Systems    Physical Exam  /86   Pulse 90   Temp 97 7 °F (36 5 °C)   Resp 20   Wt 64 8 kg (142 lb 13 7 oz)   SpO2 96%     Physical Exam    ED Medications  Medications   acetaminophen (TYLENOL) tablet 650 mg (has no administration in time range)   docusate sodium (COLACE) capsule 100 mg (100 mg Oral Given 2/12/21 0855)   senna (SENOKOT) tablet 8 6 mg (8 6 mg Oral Given 2/12/21 0855)   ondansetron (ZOFRAN) injection 4 mg (has no administration in time range)   aluminum-magnesium hydroxide-simethicone (MYLANTA) oral suspension 30 mL ( Oral Canceled Entry 2/11/21 1342)   atorvastatin (LIPITOR) tablet 40 mg (40 mg Oral Given 2/11/21 1822)   insulin lispro (HumaLOG) 100 units/mL subcutaneous injection 1-5 Units (1 Units Subcutaneous Not Given 2/12/21 1119)   heparin (porcine) 25,000 units in 0 45% NaCl 250 mL infusion (premix) (850 Units/hr Intravenous New Bag 2/12/21 1118)   multi-electrolyte (PLASMALYTE-A/ISOLYTE-S PH 7 4) IV solution (100 mL/hr Intravenous New Bag 2/12/21 0643)   magnesium oxide (MAG-OX) tablet 400 mg (400 mg Oral Given 2/12/21 0855)   midodrine (PROAMATINE) tablet 2 5 mg (2 5 mg Oral Given 2/12/21 1329)   furosemide (LASIX) 500 mg infusion 50 mL (has no administration in time range)   tetanus-diphtheria-acellular pertussis (BOOSTRIX) IM injection 0 5 mL (0 5 mL Intramuscular Given 2/10/21 1407)   aspirin rectal suppository 600 mg (600 mg Rectal Given 2/10/21 1655)   iohexol (OMNIPAQUE) 350 MG/ML injection (SINGLE-DOSE) 100 mL (85 mL Intravenous Given 2/10/21 1538)   albumin human (FLEXBUMIN) 5 % injection 12 5 g (0 g Intravenous Stopped 2/11/21 0315)   albumin human (FLEXBUMIN) 25 % injection 25 g (0 g Intravenous Stopped 2/11/21 0512)   potassium chloride 20 mEq IVPB (premix) (0 mEq Intravenous Stopped 2/11/21 1726)   Gadobutrol injection (SINGLE-DOSE) SOLN 7 mL (7 mL Intravenous Given 2/11/21 1746)   furosemide (LASIX) injection 80 mg (80 mg Intravenous Given 2/12/21 1409)       Intubation  Procedures    Notes  Airway patent -  No acute intervention      Final Diagnosis  Final diagnoses:   Aphasia determined by examination       ED Provider  Electronically Signed by     Moriah Castro MD  02/12/21 7188

## 2021-02-13 LAB
ANION GAP SERPL CALCULATED.3IONS-SCNC: 11 MMOL/L (ref 4–13)
BUN SERPL-MCNC: 62 MG/DL (ref 5–25)
CALCIUM SERPL-MCNC: 10.3 MG/DL (ref 8.3–10.1)
CHLORIDE SERPL-SCNC: 104 MMOL/L (ref 100–108)
CO2 SERPL-SCNC: 23 MMOL/L (ref 21–32)
CREAT SERPL-MCNC: 4.21 MG/DL (ref 0.6–1.3)
ERYTHROCYTE [DISTWIDTH] IN BLOOD BY AUTOMATED COUNT: 13.2 % (ref 11.6–15.1)
GFR SERPL CREATININE-BSD FRML MDRD: 12 ML/MIN/1.73SQ M
GLUCOSE SERPL-MCNC: 100 MG/DL (ref 65–140)
GLUCOSE SERPL-MCNC: 101 MG/DL (ref 65–140)
GLUCOSE SERPL-MCNC: 103 MG/DL (ref 65–140)
GLUCOSE SERPL-MCNC: 110 MG/DL (ref 65–140)
GLUCOSE SERPL-MCNC: 127 MG/DL (ref 65–140)
HCT VFR BLD AUTO: 34.7 % (ref 36.5–49.3)
HGB BLD-MCNC: 11.4 G/DL (ref 12–17)
MCH RBC QN AUTO: 32.5 PG (ref 26.8–34.3)
MCHC RBC AUTO-ENTMCNC: 32.9 G/DL (ref 31.4–37.4)
MCV RBC AUTO: 99 FL (ref 82–98)
PLATELET # BLD AUTO: 200 THOUSANDS/UL (ref 149–390)
PMV BLD AUTO: 9.7 FL (ref 8.9–12.7)
POTASSIUM SERPL-SCNC: 4.2 MMOL/L (ref 3.5–5.3)
RBC # BLD AUTO: 3.51 MILLION/UL (ref 3.88–5.62)
SODIUM SERPL-SCNC: 138 MMOL/L (ref 136–145)
WBC # BLD AUTO: 10.01 THOUSAND/UL (ref 4.31–10.16)

## 2021-02-13 PROCEDURE — 99232 SBSQ HOSP IP/OBS MODERATE 35: CPT | Performed by: INTERNAL MEDICINE

## 2021-02-13 PROCEDURE — 80048 BASIC METABOLIC PNL TOTAL CA: CPT | Performed by: PHYSICIAN ASSISTANT

## 2021-02-13 PROCEDURE — 82948 REAGENT STRIP/BLOOD GLUCOSE: CPT

## 2021-02-13 PROCEDURE — 85027 COMPLETE CBC AUTOMATED: CPT | Performed by: PHYSICIAN ASSISTANT

## 2021-02-13 PROCEDURE — 99232 SBSQ HOSP IP/OBS MODERATE 35: CPT | Performed by: PHYSICIAN ASSISTANT

## 2021-02-13 RX ADMIN — SODIUM CHLORIDE, SODIUM GLUCONATE, SODIUM ACETATE, POTASSIUM CHLORIDE, MAGNESIUM CHLORIDE, SODIUM PHOSPHATE, DIBASIC, AND POTASSIUM PHOSPHATE 100 ML/HR: .53; .5; .37; .037; .03; .012; .00082 INJECTION, SOLUTION INTRAVENOUS at 13:38

## 2021-02-13 RX ADMIN — DOCUSATE SODIUM 100 MG: 100 CAPSULE, LIQUID FILLED ORAL at 16:55

## 2021-02-13 RX ADMIN — SODIUM CHLORIDE, SODIUM GLUCONATE, SODIUM ACETATE, POTASSIUM CHLORIDE, MAGNESIUM CHLORIDE, SODIUM PHOSPHATE, DIBASIC, AND POTASSIUM PHOSPHATE 100 ML/HR: .53; .5; .37; .037; .03; .012; .00082 INJECTION, SOLUTION INTRAVENOUS at 03:23

## 2021-02-13 RX ADMIN — Medication 20 MG/HR: at 03:24

## 2021-02-13 RX ADMIN — SODIUM CHLORIDE, SODIUM GLUCONATE, SODIUM ACETATE, POTASSIUM CHLORIDE, MAGNESIUM CHLORIDE, SODIUM PHOSPHATE, DIBASIC, AND POTASSIUM PHOSPHATE 100 ML/HR: .53; .5; .37; .037; .03; .012; .00082 INJECTION, SOLUTION INTRAVENOUS at 23:20

## 2021-02-13 RX ADMIN — SENNOSIDES 8.6 MG: 8.6 TABLET, FILM COATED ORAL at 08:30

## 2021-02-13 RX ADMIN — DOCUSATE SODIUM 100 MG: 100 CAPSULE, LIQUID FILLED ORAL at 08:30

## 2021-02-13 RX ADMIN — CARBIDOPA AND LEVODOPA 2.5 MG: 50; 200 TABLET, EXTENDED RELEASE ORAL at 06:53

## 2021-02-13 RX ADMIN — ENOXAPARIN SODIUM 60 MG: 80 INJECTION SUBCUTANEOUS at 16:55

## 2021-02-13 RX ADMIN — MAGNESIUM OXIDE TAB 400 MG (241.3 MG ELEMENTAL MG) 400 MG: 400 (241.3 MG) TAB at 08:30

## 2021-02-13 RX ADMIN — ATORVASTATIN CALCIUM 40 MG: 40 TABLET, FILM COATED ORAL at 16:55

## 2021-02-13 RX ADMIN — CARBIDOPA AND LEVODOPA 2.5 MG: 50; 200 TABLET, EXTENDED RELEASE ORAL at 13:38

## 2021-02-13 NOTE — PROGRESS NOTES
NEPHROLOGY PROGRESS NOTE   Chio Araiza 81 y o  male MRN: [id number]  Unit/Bed#: [facility] 904-01 Encounter: [id number]      ASSESSMENT & PLAN:  1  Acute kidney injury suspected secondary to ATN after recent CTA  Creatinine still rising up to 4 21 today  Patient was started on Lasix drip and continue with intravenously fluids to for diuresis  For now continue with conservative management, avoid hypotension, continue with Lasix drip and sent intravenously fluids  Follow PVRs  Hoping he will plateau soon  No urgent indication for dialysis at this moment, his electrolytes remain stable, he does not look volume overload  2  CVA status post CTA  Neurology on board  3  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, he follows at [facility] as an outpatient    My plan and recommendations were discussed with primary team via tiger text    SUBJECTIVE:  Patient seen and examined, lying flat in bed on room air, denies any chest pain or shortness of breath, patient have expressive aphasia but is able to follow commands and answering simple questions    Discussed with nurse, most recent PVR to 240    OBJECTIVE:  Current Weight: Weight - Scale: 64 8 kg (142 lb 13 7 oz)  Vitals:    02/13/21 1507   BP: 139/78   Pulse: 89   Resp: 18   Temp: 98 1 °F (36 7 °C)   SpO2: 97%       Intake/Output Summary (Last 24 hours) at 2/13/2021 1749  Last data filed at 2/13/2021 1701  Gross per 24 hour   Intake 2973 26 ml   Output 1811 ml   Net 1162 26 ml     General: conscious, cooperative, in not acute distress  Eyes: conjunctivae pink, anicteric sclerae  ENT: lips and mucous membranes moist, on room air  Neck: supple, no JVD  Chest: clear breath sounds bilateral, no crackles, ronchus or wheezings  CVS: distinct S1 & S2, normal rate, regular rhythm  Abdomen: soft, non-tender, non-distended, normoactive bowel sounds  Extremities: no edema of both legs  Skin: no rash  Neuro: awake, interactive, expressive aphasia, following commands        Medications:    Current Facility-Administered Medications:     acetaminophen (TYLENOL) tablet 650 mg, 650 mg, Oral, Q4H PRN, Lawrence Galvez MD    aluminum-magnesium hydroxide-simethicone (MYLANTA) oral suspension 30 mL, 30 mL, Oral, Q6H PRN, Lawrence Galvez MD    atorvastatin (LIPITOR) tablet 40 mg, 40 mg, Oral, QPM, Lawrence Galvez MD, 40 mg at 02/13/21 1655    docusate sodium (COLACE) capsule 100 mg, 100 mg, Oral, BID, Lawrence Galvez MD, 100 mg at 02/13/21 1655    enoxaparin (LOVENOX) subcutaneous injection 60 mg, 1 mg/kg, Subcutaneous, Q24H [facility], Mariela Rebollar PA-C, 60 mg at 02/13/21 1655    furosemide (LASIX) 500 mg infusion 50 mL, 20 mg/hr, Intravenous, Continuous, Ole Arias MD, Last Rate: 2 mL/hr at 02/13/21 0324, 20 mg/hr at 02/13/21 0324    insulin lispro (HumaLOG) 100 units/mL subcutaneous injection 1-5 Units, 1-5 Units, Subcutaneous, TID AC **AND** Fingerstick Glucose (POCT), , , TID AC, Lawrence Galvez MD    magnesium oxide (MAG-OX) tablet 400 mg, 400 mg, Oral, Daily, MILAGRO Rice, 400 mg at 02/13/21 0830    midodrine (PROAMATINE) tablet 2 5 mg, 2 5 mg, Oral, BID ACAbida PA-C, 2 5 mg at 02/13/21 1338    multi-electrolyte (PLASMALYTE-A/ISOLYTE-S PH 7 4) IV solution, 100 mL/hr, Intravenous, Continuous, Oscar Smith PA-C, Last Rate: 100 mL/hr at 02/13/21 1338, 100 mL/hr at 02/13/21 1338    ondansetron (ZOFRAN) injection 4 mg, 4 mg, Intravenous, Q6H PRN, Lawrence Galvez MD    senna (SENOKOT) tablet 8 6 mg, 1 tablet, Oral, Daily, Lawrence Galvez MD, 8 6 mg at 02/13/21 0830    Invasive Devices:        Lab Results:   Results from last 7 days   Lab Units 02/13/21  0952 02/12/21  0510 02/11/21  0554  02/10/21  1403   WBC Thousand/uL 10 01 9 15 8 63   < >  --    HEMOGLOBIN g/dL 11 4* 11 5* 8 9*   < >  --    I STAT HEMOGLOBIN g/dl  --   --   --   --  11 2*   HEMATOCRIT % 34 7* 35 6* 28 3*   < >  --    HEMATOCRIT, ISTAT %  --   -- --   --  33*   PLATELETS Thousands/uL 200 214 152   < >  --    SODIUM mmol/L 138 140 141   < >  --    POTASSIUM mmol/L 4 2 4 1 3 5   < >  --    CHLORIDE mmol/L 104 108 112*   < >  --    CO2 mmol/L 23 22 19*   < >  --    CO2, I-STAT mmol/L  --   --   --   --  28   BUN mg/dL 62* 54* 42*   < >  --    CREATININE mg/dL 4 21* 3 08* 1 91*   < >  --    CALCIUM mg/dL 10 3* 10 8* 9 2   < >  --    MAGNESIUM mg/dL  --  2 1 1 7  --   --    PHOSPHORUS mg/dL  --  4 0 3 1  --   --    ALK PHOS U/L  --  84 68  --   --    ALT U/L  --  24 20  --   --    AST U/L  --  61* 45  --   --    GLUCOSE, ISTAT mg/dl  --   --   --   --  132    < > = values in this interval not displayed  Previous work up:  See previous notes      Portions of the record may have been created with voice recognition software  Occasional wrong word or "sound a like" substitutions may have occurred due to the inherent limitations of voice recognition software  Read the chart carefully and recognize, using context, where substitutions have occurred  If you have any questions, please contact the dictating provider

## 2021-02-13 NOTE — PLAN OF CARE
Problem: Prexisting or High Potential for Compromised Skin Integrity  Goal: Skin integrity is maintained or improved  Description: INTERVENTIONS:  - Identify patients at risk for skin breakdown  - Assess and monitor skin integrity  - Assess and monitor nutrition and hydration status  - Monitor labs   - Assess for incontinence   - Turn and reposition patient  - Assist with mobility/ambulation  - Relieve pressure over bony prominences  - Avoid friction and shearing  - Provide appropriate hygiene as needed including keeping skin clean and dry  - Evaluate need for skin moisturizer/barrier cream  - Collaborate with interdisciplinary team   - Patient/family teaching  - Consider wound care consult   Outcome: Progressing     Problem: PAIN - ADULT  Goal: Verbalizes/displays adequate comfort level or baseline comfort level  Description: Interventions:  - Encourage patient to monitor pain and request assistance  - Assess pain using appropriate pain scale  - Administer analgesics based on type and severity of pain and evaluate response  - Implement non-pharmacological measures as appropriate and evaluate response  - Consider cultural and social influences on pain and pain management  - Notify physician/advanced practitioner if interventions unsuccessful or patient reports new pain  Outcome: Progressing     Problem: INFECTION - ADULT  Goal: Absence or prevention of progression during hospitalization  Description: INTERVENTIONS:  - Assess and monitor for signs and symptoms of infection  - Monitor lab/diagnostic results  - Monitor all insertion sites, i e  indwelling lines, tubes, and drains  - Monitor endotracheal if appropriate and nasal secretions for changes in amount and color  - [place] appropriate cooling/warming therapies per order  - Administer medications as ordered  - Instruct and encourage patient and family to use good hand hygiene technique  - Identify and instruct in appropriate isolation precautions for identified infection/condition  Outcome: Progressing  Goal: Absence of fever/infection during neutropenic period  Description: INTERVENTIONS:  - Monitor WBC    Outcome: Progressing     Problem: SAFETY ADULT  Goal: Patient will remain free of falls  Description: INTERVENTIONS:  - Assess patient frequently for physical needs  -  Identify cognitive and physical deficits and behaviors that affect risk of falls    -  [place] fall precautions as indicated by assessment   - Educate patient/family on patient safety including physical limitations  - Instruct patient to call for assistance with activity based on assessment  - Modify environment to reduce risk of injury  - Consider OT/PT consult to assist with strengthening/mobility  Outcome: Progressing  Goal: Maintain or return to baseline ADL function  Description: INTERVENTIONS:  -  Assess patient's ability to carry out ADLs; assess patient's baseline for ADL function and identify physical deficits which impact ability to perform ADLs (bathing, care of mouth/teeth, toileting, grooming, dressing, etc )  - Assess/evaluate cause of self-care deficits   - Assess range of motion  - Assess patient's mobility; develop plan if impaired  - Assess patient's need for assistive devices and provide as appropriate  - Encourage maximum independence but intervene and supervise when necessary  - Involve family in performance of ADLs  - Assess for home care needs following discharge   - Consider OT consult to assist with ADL evaluation and planning for discharge  - Provide patient education as appropriate  Outcome: Progressing  Goal: Maintain or return mobility status to optimal level  Description: INTERVENTIONS:  - Assess patient's baseline mobility status (ambulation, transfers, stairs, etc )    - Identify cognitive and physical deficits and behaviors that affect mobility  - Identify mobility aids required to assist with transfers and/or ambulation (gait belt, sit-to-stand, lift, walker, cane, etc )  - [place] fall precautions as indicated by assessment  - Record patient progress and toleration of activity level on Mobility SBAR; progress patient to next Phase/Stage  - Instruct patient to call for assistance with activity based on assessment  - Consider rehabilitation consult to assist with strengthening/weightbearing, etc   Outcome: Progressing     Problem: DISCHARGE PLANNING  Goal: Discharge to home or other facility with appropriate resources  Description: INTERVENTIONS:  - Identify barriers to discharge w/patient and caregiver  - Arrange for needed discharge resources and transportation as appropriate  - Identify discharge learning needs (meds, wound care, etc )  - Arrange for interpretive services to assist at discharge as needed  - Refer to Case Management Department for coordinating discharge planning if the patient needs post-hospital services based on physician/advanced practitioner order or complex needs related to functional status, cognitive ability, or social support system  Outcome: Progressing     Problem: Knowledge Deficit  Goal: Patient/family/caregiver demonstrates understanding of disease process, treatment plan, medications, and discharge instructions  Description: Complete learning assessment and assess knowledge base  Interventions:  - Provide teaching at level of understanding  - Provide teaching via preferred learning methods  Outcome: Progressing     Problem: Neurological Deficit  Goal: Neurological status is stable or improving  Description: Interventions:  - Monitor and assess patient's level of consciousness, motor function, sensory function, and level of assistance needed for ADLs  - Monitor and report changes from baseline  Collaborate with interdisciplinary team to initiate plan and implement interventions as ordered  - Provide and maintain a safe environment  - Consider seizure precautions  - Consider fall precautions  - Consider aspiration precautions    - Consider bleeding precautions  Outcome: Progressing     Problem: Activity Intolerance/Impaired Mobility  Goal: Mobility/activity is maintained at optimum level for patient  Description: Interventions:  - Assess and monitor patient  barriers to mobility and need for assistive/adaptive devices  - Assess patient's emotional response to limitations  - Collaborate with interdisciplinary team and initiate plans and interventions as ordered  - Encourage independent activity per ability   - Maintain proper body alignment  - Perform active/passive rom as tolerated/ordered  - Plan activities to conserve energy   - Turn patient as appropriate  Outcome: Progressing     Problem: Communication Impairment  Goal: Ability to express needs and understand communication  Description: Assess patient's communication skills and ability to understand information  Patient will demonstrate use of effective communication techniques, alternative methods of communication and understanding even if not able to speak  - Encourage communication and provide alternate methods of communication as needed  - Collaborate with case management/ for discharge needs  - Include patient/family/caregiver in decisions related to communication  Outcome: Progressing     Problem: Potential for Aspiration  Goal: Non-ventilated patient's risk of aspiration is minimized  Description: Assess and monitor vital signs, respiratory status, and labs (WBC)  Monitor for signs of aspiration (tachypnea, cough, rales, wheezing, cyanosis, fever)  - Assess and monitor patient's ability to swallow  - Place patient up in chair to eat if possible  - HOB up at 90 degrees to eat if unable to get patient up into chair   - Supervise patient during oral intake  - Instruct patient/ family to take small bites  - Instruct patient/ family to take small single sips when taking liquids    - Follow patient-specific strategies generated by speech pathologist   Outcome: Progressing     Problem: Nutrition  Goal: Nutrition/Hydration status is improving  Description: Monitor and assess patient's nutrition/hydration status for malnutrition (ex- brittle hair, bruises, dry skin, pale skin and conjunctiva, muscle wasting, smooth red tongue, and disorientation)  Collaborate with interdisciplinary team and initiate plan and interventions as ordered  Monitor patient's weight and dietary intake as ordered or per policy  Utilize nutrition screening tool and intervene per policy  Determine patient's food preferences and provide high-protein, high-caloric foods as appropriate  - Assist patient with eating   - Allow adequate time for meals   - Encourage patient to take dietary supplement as ordered  - Collaborate with clinical nutritionist   - Include patient/family/caregiver in decisions related to nutrition  Outcome: Progressing     Problem: Potential for Falls  Goal: Patient will remain free of falls  Description: INTERVENTIONS:  - Assess patient frequently for physical needs  -  Identify cognitive and physical deficits and behaviors that affect risk of falls    -  [place] fall precautions as indicated by assessment   - Educate patient/family on patient safety including physical limitations  - Instruct patient to call for assistance with activity based on assessment  - Modify environment to reduce risk of injury  - Consider OT/PT consult to assist with strengthening/mobility  Outcome: Progressing

## 2021-02-13 NOTE — ASSESSMENT & PLAN NOTE
· Per record review, on 2/10/21, patient was noted to be expressively aphasic, last known well at noon that day, with aphasia noted right after a fall  · Did not receive tPA due to Xarelto use  · Initial CT head with microangiopathic changes, no acute intracranial abnormality  CTA head/neck with left distal M2 and M3 occlusion however no occlusion was noted by Neurosurgeon per Neurology records  Repeat CT head from 2/10/21 due to worsening aphasia with no evidence of acute vascular territorial infarction, intracranial hemorrhage or mass effect  · MRI brain revealed cluster of acute to early subacute embolic infarcts in the left MCA territory, within the inferior parietal temporal region  Few scattered embolic infarcts in the frontoparietal region and right cerebellar hemisphere  Possible central/cardiac source  · Per Neurology on 2/12/21 the patient's heparin GTT was discontinued and he switched to Lovenox (1mg/kg Q24hr, dosing per Neuro/Nephro/Pharmacy)  Neurology has been attempting to reach patient's Oncology team  Lovenox dosing per Neurology/Nephrology  · Continue goal permissive hypertension per Neuro, -160   Patient on midodrine per Neuro

## 2021-02-13 NOTE — ASSESSMENT & PLAN NOTE
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

## 2021-02-13 NOTE — PROGRESS NOTES
Pt with 100 ml UO in last 7 hr  bladderscan 240  Dr Kiesha Bishop notified and will eval pt  Will cont to monitor closely

## 2021-02-13 NOTE — PROGRESS NOTES
Progress Note - Alejandra Klaus 1935, 81 y o  male MRN: [id number]    Unit/Bed#: [facility] 904-01 Encounter: [id number]    Primary Care Provider: Destiny Rizvi MD   Date and time admitted to hospital: 2/10/2021  1:55 PM        * CVA (cerebral vascular accident) [facility])  Assessment & Plan  · Per record review, on 2/10/21, patient was noted to be expressively aphasic, last known well at noon that day, with aphasia noted right after a fall  · Did not receive tPA due to Xarelto use  · Initial CT head with microangiopathic changes, no acute intracranial abnormality  CTA head/neck with left distal M2 and M3 occlusion however no occlusion was noted by Neurosurgeon per Neurology records  Repeat CT head from 2/10/21 due to worsening aphasia with no evidence of acute vascular territorial infarction, intracranial hemorrhage or mass effect  · MRI brain revealed cluster of acute to early subacute embolic infarcts in the left MCA territory, within the inferior parietal temporal region  Few scattered embolic infarcts in the frontoparietal region and right cerebellar hemisphere  Possible central/cardiac source  · Per Neurology on 2/12/21 the patient's heparin GTT was discontinued and he switched to Lovenox (1mg/kg Q24hr, dosing per Neuro/Nephro/Pharmacy)  Neurology has been attempting to reach patient's Oncology team  Lovenox dosing per Neurology/Nephrology  · Continue goal permissive hypertension per Neuro, -160  Patient on midodrine per Neuro      VELVET (acute kidney injury) ([facility] [address] )  Assessment & Plan  · Creatinine 3 08 2/12/21, up from 1 91 the day prior, and baseline appears to be around 1 0  · Patient had CTA as above  · Nephrology consult appreciated  · Lovenox renally dosed as above   · On Lasix GTT per Nephrology  BMP ordered  · Continue telemetry   · Avoid hypotension  Patient on midodrine per Neuro as above  · BMP monitoring     Pulmonary emboli [facility])  Assessment & Plan  · Known history   Patient was on Xarelto · Xarelto was held on admission and patient was initially placed on heparin GTT  Now on Lovenox 1mg/kg Q24hr as above    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 [facility])  Assessment & Plan  · Patient with known urothelial carcinoma, muscle invasive, 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  Follows at [facility]  · CTA noted:  Multiple somewhat ill-defined biapical pulmonary nodules measuring up to 1 4 cm in the left upper lobe  While these may be postinflammatory/postinfectious in nature, underlying malignancy to be excluded  Fall  Assessment & Plan  · Appreciate trauma input, they have since signed off  · Continue PT/OT   · Fall precautions       VTE Pharmacologic Prophylaxis:   Pharmacologic: Enoxaparin (Lovenox)  Mechanical VTE Prophylaxis in Place: Yes    Patient Centered Rounds: I have performed bedside rounds with nursing staff today  Discussions with Specialists or Other Care Team Provider: discussed UOP with Dr Tylor Smith, awaiting final  recommendations     Education and Discussions with Family / Patient: patient and his wife over the phone     Time Spent for Care: 30 minutes  More than 50% of total time spent on counseling and coordination of care as described above  Current Length of Stay: 3 day(s)    Current Patient Status: Inpatient   Certification Statement: The patient will continue to require additional inpatient hospital stay due to as above    Discharge Plan: pending Neuro clearance  Therapies recommending rehab     Code Status: Level 1 - Full Code      Subjective:   Mr Kristine Hopper with expressive aphasia  When asked if he is having any pain whatsoever he responded "no"    Objective:     Vitals:   Temp (24hrs), Av 1 °F (36 7 °C), Min:97 7 °F (36 5 °C), Max:98 4 °F (36 9 °C)    Temp:  [97 7 °F (36 5 °C)-98 4 °F (36 9 °C)] 98 1 °F (36 7 °C)  HR:  [82-90] 82  Resp:  [17-20] 18  BP: (137-159)/(69-87) 137/77  SpO2:  [96 %] 96 %  Body mass index is 20 5 kg/m²       Input and Output Summary (last 24 hours): Intake/Output Summary (Last 24 hours) at 2/13/2021 1033  Last data filed at 2/13/2021 0601  Gross per 24 hour   Intake 2630 16 ml   Output 1963 ml   Net 667 16 ml       Physical Exam:     Physical Exam  Vitals signs and nursing note reviewed  Exam conducted with a chaperone present  Constitutional:       Comments: Patient seen sitting in bed comfortably resting with his nurse present   Cardiovascular:      Rate and Rhythm: Normal rate and regular rhythm  Pulmonary:      Effort: Pulmonary effort is normal  No respiratory distress  Breath sounds: Normal breath sounds  Abdominal:      General: Bowel sounds are normal       Palpations: Abdomen is soft  Tenderness: There is no abdominal tenderness  Musculoskeletal:      Right lower leg: No edema  Left lower leg: No edema  Skin:     General: Skin is warm  Neurological:      Mental Status: He is alert  Comments: With expressive aphasia  Follows commands   Moves all 4 extremities    Psychiatric:         Mood and Affect: Mood normal          Behavior: Behavior normal          Additional Data:     Labs:    Results from last 7 days   Lab Units 02/13/21  0952 02/12/21  0510   WBC Thousand/uL 10 01 9 15   HEMOGLOBIN g/dL 11 4* 11 5*   HEMATOCRIT % 34 7* 35 6*   PLATELETS Thousands/uL 200 214   NEUTROS PCT %  --  82*   LYMPHS PCT %  --  9*   MONOS PCT %  --  7   EOS PCT %  --  1     Results from last 7 days   Lab Units 02/12/21  0510   SODIUM mmol/L 140   POTASSIUM mmol/L 4 1   CHLORIDE mmol/L 108   CO2 mmol/L 22   BUN mg/dL 54*   CREATININE mg/dL 3 08*   ANION GAP mmol/L 10   CALCIUM mg/dL 10 8*   ALBUMIN g/dL 3 4*   TOTAL BILIRUBIN mg/dL 0 54   ALK PHOS U/L 84   ALT U/L 24   AST U/L 61*   GLUCOSE RANDOM mg/dL 88     Results from last 7 days   Lab Units 02/11/21  0026   INR  1 34*     Results from last 7 days   Lab Units 02/13/21  0828 02/12/21  1614 02/12/21  1117 02/12/21  0758 02/11/21  1824 02/11/21  1040   POC GLUCOSE mg/dl 103 101 160* 98 87 114     Results from last 7 days   Lab Units 02/11/21  0554   HEMOGLOBIN A1C % 5 8*               * I Have Reviewed All Lab Data Listed Above  * Additional Pertinent Lab Tests Reviewed: All Labs Within Last 24 Hours Reviewed    Imaging:    Imaging Reports Reviewed Today Include: None  Imaging Personally Reviewed by Myself Includes:  none    Recent Cultures (last 7 days):           Last 24 Hours Medication List:   Current Facility-Administered Medications   Medication Dose Route Frequency Provider Last Rate    acetaminophen  650 mg Oral Q4H PRN Dipak Lee MD      aluminum-magnesium hydroxide-simethicone  30 mL Oral Q6H PRN Dipak Lee MD      atorvastatin  40 mg Oral QPM Dipak Lee MD      docusate sodium  100 mg Oral BID Dipak Lee MD      enoxaparin  1 mg/kg Subcutaneous Q24H [address] Eron Cat PA-C      furosemide  20 mg/hr Intravenous Continuous Elizabeth Bradshaw MD 20 mg/hr (02/13/21 0324)    insulin lispro  1-5 Units Subcutaneous TID AC Dipak Lee MD      magnesium oxide  400 mg Oral Daily Erminio Simple, CRNP      midodrine  2 5 mg Oral BID AC Montse Balderas PA-C      multi-electrolyte  100 mL/hr Intravenous Continuous Cambadrian, PA-C 100 mL/hr (02/13/21 0323)    ondansetron  4 mg Intravenous Q6H PRN Dipak Lee MD      senna  1 tablet Oral Daily Dipak Lee MD          Today, Patient Was Seen By: Eron Cat PA-C    ** Please Note: Dictation voice to text software may have been used in the creation of this document   **

## 2021-02-13 NOTE — ASSESSMENT & PLAN NOTE
· Creatinine 3 08 2/12/21, up from 1 91 the day prior, and baseline appears to be around 1 0  · Patient had CTA as above  · Nephrology consult appreciated  · Lovenox renally dosed as above   · On Lasix GTT per Nephrology  BMP ordered  · Continue telemetry   · Avoid hypotension   Patient on midodrine per Neuro as above  · BMP monitoring

## 2021-02-14 LAB
ANION GAP SERPL CALCULATED.3IONS-SCNC: 16 MMOL/L (ref 4–13)
BUN SERPL-MCNC: 64 MG/DL (ref 5–25)
CALCIUM SERPL-MCNC: 8.6 MG/DL (ref 8.3–10.1)
CHLORIDE SERPL-SCNC: 102 MMOL/L (ref 100–108)
CO2 SERPL-SCNC: 21 MMOL/L (ref 21–32)
CREAT SERPL-MCNC: 4.45 MG/DL (ref 0.6–1.3)
ERYTHROCYTE [DISTWIDTH] IN BLOOD BY AUTOMATED COUNT: 13.2 % (ref 11.6–15.1)
GFR SERPL CREATININE-BSD FRML MDRD: 11 ML/MIN/1.73SQ M
GLUCOSE SERPL-MCNC: 108 MG/DL (ref 65–140)
GLUCOSE SERPL-MCNC: 111 MG/DL (ref 65–140)
GLUCOSE SERPL-MCNC: 121 MG/DL (ref 65–140)
GLUCOSE SERPL-MCNC: 74 MG/DL (ref 65–140)
GLUCOSE SERPL-MCNC: 87 MG/DL (ref 65–140)
HCT VFR BLD AUTO: 28.3 % (ref 36.5–49.3)
HGB BLD-MCNC: 9.2 G/DL (ref 12–17)
MCH RBC QN AUTO: 32.2 PG (ref 26.8–34.3)
MCHC RBC AUTO-ENTMCNC: 32.5 G/DL (ref 31.4–37.4)
MCV RBC AUTO: 99 FL (ref 82–98)
PLATELET # BLD AUTO: 176 THOUSANDS/UL (ref 149–390)
PMV BLD AUTO: 9.8 FL (ref 8.9–12.7)
POTASSIUM SERPL-SCNC: 4.6 MMOL/L (ref 3.5–5.3)
RBC # BLD AUTO: 2.86 MILLION/UL (ref 3.88–5.62)
SODIUM SERPL-SCNC: 139 MMOL/L (ref 136–145)
WBC # BLD AUTO: 7.62 THOUSAND/UL (ref 4.31–10.16)

## 2021-02-14 PROCEDURE — 99232 SBSQ HOSP IP/OBS MODERATE 35: CPT | Performed by: INTERNAL MEDICINE

## 2021-02-14 PROCEDURE — 80048 BASIC METABOLIC PNL TOTAL CA: CPT | Performed by: PHYSICIAN ASSISTANT

## 2021-02-14 PROCEDURE — 82948 REAGENT STRIP/BLOOD GLUCOSE: CPT

## 2021-02-14 PROCEDURE — 85027 COMPLETE CBC AUTOMATED: CPT | Performed by: PHYSICIAN ASSISTANT

## 2021-02-14 PROCEDURE — 99232 SBSQ HOSP IP/OBS MODERATE 35: CPT | Performed by: PHYSICIAN ASSISTANT

## 2021-02-14 RX ADMIN — Medication 20 MG/HR: at 08:15

## 2021-02-14 RX ADMIN — CARBIDOPA AND LEVODOPA 2.5 MG: 50; 200 TABLET, EXTENDED RELEASE ORAL at 06:14

## 2021-02-14 RX ADMIN — ATORVASTATIN CALCIUM 40 MG: 40 TABLET, FILM COATED ORAL at 16:44

## 2021-02-14 RX ADMIN — ENOXAPARIN SODIUM 60 MG: 80 INJECTION SUBCUTANEOUS at 16:45

## 2021-02-14 RX ADMIN — CARBIDOPA AND LEVODOPA 2.5 MG: 50; 200 TABLET, EXTENDED RELEASE ORAL at 13:09

## 2021-02-14 RX ADMIN — MAGNESIUM OXIDE TAB 400 MG (241.3 MG ELEMENTAL MG) 400 MG: 400 (241.3 MG) TAB at 08:54

## 2021-02-14 RX ADMIN — DOCUSATE SODIUM 100 MG: 100 CAPSULE, LIQUID FILLED ORAL at 08:54

## 2021-02-14 RX ADMIN — DOCUSATE SODIUM 100 MG: 100 CAPSULE, LIQUID FILLED ORAL at 16:45

## 2021-02-14 RX ADMIN — SODIUM CHLORIDE, SODIUM GLUCONATE, SODIUM ACETATE, POTASSIUM CHLORIDE, MAGNESIUM CHLORIDE, SODIUM PHOSPHATE, DIBASIC, AND POTASSIUM PHOSPHATE 100 ML/HR: .53; .5; .37; .037; .03; .012; .00082 INJECTION, SOLUTION INTRAVENOUS at 18:26

## 2021-02-14 RX ADMIN — SENNOSIDES 8.6 MG: 8.6 TABLET, FILM COATED ORAL at 08:54

## 2021-02-14 RX ADMIN — SODIUM CHLORIDE, SODIUM GLUCONATE, SODIUM ACETATE, POTASSIUM CHLORIDE, MAGNESIUM CHLORIDE, SODIUM PHOSPHATE, DIBASIC, AND POTASSIUM PHOSPHATE 100 ML/HR: .53; .5; .37; .037; .03; .012; .00082 INJECTION, SOLUTION INTRAVENOUS at 08:53

## 2021-02-14 NOTE — PROGRESS NOTES
Progress Note - Maite Barney 1935, 81 y o  male MRN: [id number]    [facility] Encounter: [id number]    Primary Care Provider: Britany Tobar MD   Date and time admitted to hospital: 2/10/2021  1:55 PM        * CVA (cerebral vascular accident) [facility])  Assessment & Plan  · Per record review, on 2/10/21, patient was noted to be expressively aphasic, last known well at noon that day, with aphasia noted right after a fall  · Did not receive tPA due to Xarelto use  · Initial CT head with microangiopathic changes, no acute intracranial abnormality  CTA head/neck with left distal M2 and M3 occlusion however no occlusion was noted by Neurosurgeon per Neurology records  Repeat CT head from 2/10/21 due to worsening aphasia with no evidence of acute vascular territorial infarction, intracranial hemorrhage or mass effect  · MRI brain revealed cluster of acute to early subacute embolic infarcts in the left MCA territory, within the inferior parietal temporal region  Few scattered embolic infarcts in the frontoparietal region and right cerebellar hemisphere  Possible central/cardiac source  · Per Neurology on 2/12/21 the patient's heparin GTT was discontinued and he switched to Lovenox (1mg/kg Q24hr, dosing per Neuro/Nephro/Pharmacy)  Neurology has been attempting to reach patient's Oncology team  Lovenox dosing per Neurology/Nephrology  · Continue goal permissive hypertension per Neuro, -160  Patient on midodrine per Neuro      VELVET (acute kidney injury) ([facility] [address] )  Assessment & Plan  · Creatinine 3 08 2/12/21, up from 1 91 the day prior, and baseline appears to be around 1 0  · Patient had CTA as above  · Nephrology consult appreciated, I discussed with them today  · Lovenox renally dosed as above   · On Lasix GTT per Nephrology  BMP monitoring  Creatinine worsened again today  · Continue telemetry   · Avoid hypotension   Patient on midodrine per Neuro as above    Pulmonary emboli [facility])  Assessment & Plan  · Known history  Patient was on Xarelto   · Xarelto was held on admission and patient was initially placed on heparin GTT  Now on Lovenox 1mg/kg Q24hr as above    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  Follows at [place]  · CTA noted:  Multiple somewhat ill-defined biapical pulmonary nodules measuring up to 1 4 cm in the left upper lobe  While these may be postinflammatory/postinfectious in nature, underlying malignancy to be excluded  Fall  Assessment & Plan  · Appreciate trauma input, they have since signed off  · Continue PT/OT   · Fall precautions       VTE Pharmacologic Prophylaxis:   Pharmacologic: Enoxaparin (Lovenox)  Mechanical VTE Prophylaxis in Place: Yes    Patient Centered Rounds: I have performed bedside rounds with nursing staff today  Discussions with Specialists or Other Care Team Provider: Dr Mikel Chung Nephrology     Education and Discussions with Family / Patient: patient and his wife over the phone     Time Spent for Care: 30 minutes  More than 50% of total time spent on counseling and coordination of care as described above  Current Length of Stay: 4 day(s)    Current Patient Status: Inpatient   Certification Statement: The patient will continue to require additional inpatient hospital stay due to remains on lasix GTT    Discharge Plan: once cleared by Nephro    Code Status: Level 1 - Full Code      Subjective:   Mr Mil Leiva appears to be speaking more today   He answers yes/no questions appropriately but struggles and gets frustrated when trying to speak in phrases or sentences     Objective:     Vitals:   Temp (24hrs), Av °F (36 7 °C), Min:97 5 °F (36 4 °C), Max:98 3 °F (36 8 °C)    Temp:  [97 5 °F (36 4 °C)-98 3 °F (36 8 °C)] 97 5 °F (36 4 °C)  HR:  [84-89] 89  Resp:  [18-20] 18  BP: (139-144)/(77-81) 143/80  SpO2:  [96 %-99 %] 96 %  Body mass index is 20 5 kg/m²  Input and Output Summary (last 24 hours): Intake/Output Summary (Last 24 hours) at 2/14/2021 1425  Last data filed at 2/14/2021 1201  Gross per 24 hour   Intake 2289 76 ml   Output 25 ml   Net 2264 76 ml       Physical Exam:     Physical Exam  Vitals signs and nursing note reviewed  Exam conducted with a chaperone present  Constitutional:       General: He is not in acute distress  Comments: Patient seen sitting upright comfortably resting in bed with his nurse present   Cardiovascular:      Rate and Rhythm: Normal rate and regular rhythm  Pulmonary:      Effort: Pulmonary effort is normal  No respiratory distress  Breath sounds: Normal breath sounds  Abdominal:      General: Bowel sounds are normal       Palpations: Abdomen is soft  Tenderness: There is no abdominal tenderness  Musculoskeletal:      Right lower leg: No edema  Left lower leg: No edema  Skin:     General: Skin is warm  Neurological:      Mental Status: He is alert  Comments: Expressively aphasic  Follows commands  Moves all 4 extremities  Psychiatric:         Mood and Affect: Mood normal          Behavior: Behavior normal       Comments: Very pleasant and cooperative  Not anxious or agitated appearing           Additional Data:     Labs:    Results from last 7 days   Lab Units 02/14/21  0453  02/12/21  0510   WBC Thousand/uL 7 62   < > 9 15   HEMOGLOBIN g/dL 9 2*   < > 11 5*   HEMATOCRIT % 28 3*   < > 35 6*   PLATELETS Thousands/uL 176   < > 214   NEUTROS PCT %  --   --  82*   LYMPHS PCT %  --   --  9*   MONOS PCT %  --   --  7   EOS PCT %  --   --  1    < > = values in this interval not displayed       Results from last 7 days   Lab Units 02/14/21 0453 02/12/21  0510   SODIUM mmol/L 139   < > 140   POTASSIUM mmol/L 4 6   < > 4 1   CHLORIDE mmol/L 102   < > 108   CO2 mmol/L 21   < > 22   BUN mg/dL 64*   < > 54*   CREATININE mg/dL 4 45*   < > 3 08*   ANION GAP mmol/L 16*   < > 10   CALCIUM mg/dL 8 6   < > 10 8*   ALBUMIN g/dL  --   --  3 4*   TOTAL BILIRUBIN mg/dL  --   --  0 54   ALK PHOS U/L  --   --  84   ALT U/L  --   --  24   AST U/L  --   --  61*   GLUCOSE RANDOM mg/dL 74   < > 88    < > = values in this interval not displayed  Results from last 7 days   Lab Units 02/11/21  0026   INR  1 34*     Results from last 7 days   Lab Units 02/14/21  1137 02/14/21  0754 02/13/21  2039 02/13/21  1635 02/13/21  1158 02/13/21  0828 02/12/21  1614 02/12/21  1117 02/12/21  0758 02/11/21  1824 02/11/21  1040   POC GLUCOSE mg/dl 121 87 100 101 110 103 101 160* 98 87 114     Results from last 7 days   Lab Units 02/11/21  0554   HEMOGLOBIN A1C % 5 8*               * I Have Reviewed All Lab Data Listed Above  * Additional Pertinent Lab Tests Reviewed:  All Labs Within Last 24 Hours Reviewed    Imaging:    Imaging Reports Reviewed Today Include: none  Imaging Personally Reviewed by Myself Includes:  none    Recent Cultures (last 7 days):           Last 24 Hours Medication List:   Current Facility-Administered Medications   Medication Dose Route Frequency Provider Last Rate    acetaminophen  650 mg Oral Q4H PRN Nilesh Weinstein MD      aluminum-magnesium hydroxide-simethicone  30 mL Oral Q6H PRN Nilesh Weinstein MD      atorvastatin  40 mg Oral QPM Nilesh Weinstein MD      docusate sodium  100 mg Oral BID Nilesh Weinstein MD      enoxaparin  1 mg/kg Subcutaneous Q24H [address] Isra Curiel PA-C      furosemide  20 mg/hr Intravenous Continuous Darius Peterson MD 20 mg/hr (02/14/21 0815)    insulin lispro  1-5 Units Subcutaneous TID AC Nilesh Weinstein MD      magnesium oxide  400 mg Oral Daily MILAGRO Foster Mai      midodrine  2 5 mg Oral BID AC Crhis Salmon PA-C      multi-electrolyte  100 mL/hr Intravenous Continuous MARIA EUGENIA Genao 100 mL/hr (02/14/21 0853)    ondansetron  4 mg Intravenous Q6H PRN Nilesh Weinstein MD      senna  1 tablet Oral Daily Nilesh Weinstein MD Today, Patient Was Seen By: Leda Carranza PA-C    ** Please Note: Dictation voice to text software may have been used in the creation of this document   **

## 2021-02-14 NOTE — PROGRESS NOTES
Per previous shift RN, lasix gtt on hold since 2am, multiple request to pharmacy since 2 am  Per pharmacy they have "no tubes  " tube sent to pharmacy now for IV lasix   Will hang immediately upon arrival

## 2021-02-14 NOTE — ASSESSMENT & PLAN NOTE
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

## 2021-02-14 NOTE — PROGRESS NOTES
NEPHROLOGY PROGRESS NOTE   Keira Jeffrey 81 y o  male MRN: [id number]  Unit/Bed#: [facility] 904-01 Encounter: [id number]      ASSESSMENT & PLAN:  1  Acute kidney injury suspected secondary to ATN after CTA on 02/10  Serum creatinine is still rising but speed seems to be slowing down  Continue with current medical treatment with intravenously fluids and Lasix drip  Follow PVRs  Hoping he will plateau soon  No urgent indication for dialysis at this moment  Avoid relative hypotension  Follow daily labs and monitor ins and outs    2  CVA status post CTA  Neurology on board      3  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, he follows at [facility] as an outpatient    My plan and recommendations were discussed with primary team      SUBJECTIVE:  Patient seen and examined, denies any complaints, continues with aggressive aphasia but significant improving compared than yesterday, denies any chest pain, no shortness of breast, no nausea, no vomiting, reported minimal urine output despite Lasix drip, last postvoid residual to 250 cc    OBJECTIVE:  Current Weight: Weight - Scale: 64 8 kg (142 lb 13 7 oz)  Vitals:    02/14/21 1511   BP: 142/80   Pulse: 84   Resp: 18   Temp: 97 8 °F (36 6 °C)   SpO2: 97%       Intake/Output Summary (Last 24 hours) at 2/14/2021 1555  Last data filed at 2/14/2021 1201  Gross per 24 hour   Intake 2289 76 ml   Output 25 ml   Net 2264 76 ml     General: conscious, cooperative, in not acute distress  Eyes: conjunctivae pink, anicteric sclerae  ENT: lips and mucous membranes moist  Neck: supple, no JVD  Chest: clear breath sounds bilateral, no crackles, ronchus or wheezings  CVS: distinct S1 & S2, normal rate, regular rhythm  Abdomen: soft, non-tender, non-distended, normoactive bowel sounds  Extremities: no edema of both legs  Skin: no rash  Neuro: awake, alert, oriented with expressive aphasia        Medications:    Current Facility-Administered Medications:    acetaminophen (TYLENOL) tablet 650 mg, 650 mg, Oral, Q4H PRN, Beverly Hwang MD    aluminum-magnesium hydroxide-simethicone (MYLANTA) oral suspension 30 mL, 30 mL, Oral, Q6H PRN, Beverly Hwang MD    atorvastatin (LIPITOR) tablet 40 mg, 40 mg, Oral, QPM, Beverly Hwang MD, 40 mg at 02/13/21 1655    docusate sodium (COLACE) capsule 100 mg, 100 mg, Oral, BID, Beverly Hwang MD, 100 mg at 02/14/21 0854    enoxaparin (LOVENOX) subcutaneous injection 60 mg, 1 mg/kg, Subcutaneous, Q24H [address], Bar Breaux PA-C, 60 mg at 02/13/21 1655    furosemide (LASIX) 500 mg infusion 50 mL, 20 mg/hr, Intravenous, Continuous, Agnieszka Malcolm MD, Last Rate: 2 mL/hr at 02/14/21 0815, 20 mg/hr at 02/14/21 0815    insulin lispro (HumaLOG) 100 units/mL subcutaneous injection 1-5 Units, 1-5 Units, Subcutaneous, TID AC **AND** Fingerstick Glucose (POCT), , , TID AC, Beverly Hwang MD    magnesium oxide (MAG-OX) tablet 400 mg, 400 mg, Oral, Daily, MILAGRO Rice, 400 mg at 02/14/21 0854    midodrine (PROAMATINE) tablet 2 5 mg, 2 5 mg, Oral, BID AC, Chester Pringle PA-C, 2 5 mg at 02/14/21 1309    multi-electrolyte (PLASMALYTE-A/ISOLYTE-S PH 7 4) IV solution, 100 mL/hr, Intravenous, Continuous, Oscar Smith PA-C, Last Rate: 100 mL/hr at 02/14/21 0853, 100 mL/hr at 02/14/21 0853    ondansetron (ZOFRAN) injection 4 mg, 4 mg, Intravenous, Q6H PRN, Beverly Hwang MD    senna (SENOKOT) tablet 8 6 mg, 1 tablet, Oral, Daily, Beverly Hwang MD, 8 6 mg at 02/14/21 0854    Invasive Devices:        Lab Results:   Results from last 7 days   Lab Units 02/14/21  0453 02/13/21  0952 02/12/21  0510 02/11/21  0554  02/10/21  1403   WBC Thousand/uL 7 62 10 01 9 15 8 63   < >  --    HEMOGLOBIN g/dL 9 2* 11 4* 11 5* 8 9*   < >  --    I STAT HEMOGLOBIN g/dl  --   --   --   --   --  11 2*   HEMATOCRIT % 28 3* 34 7* 35 6* 28 3*   < >  --    HEMATOCRIT, ISTAT %  --   --   --   --   --  33*   PLATELETS Thousands/uL 176 200 214 152   < >  --    SODIUM mmol/L 139 138 140 141   < >  --    POTASSIUM mmol/L 4 6 4 2 4 1 3 5   < >  --    CHLORIDE mmol/L 102 104 108 112*   < >  --    CO2 mmol/L 21 23 22 19*   < >  --    CO2, I-STAT mmol/L  --   --   --   --   --  28   BUN mg/dL 64* 62* 54* 42*   < >  --    CREATININE mg/dL 4 45* 4 21* 3 08* 1 91*   < >  --    CALCIUM mg/dL 8 6 10 3* 10 8* 9 2   < >  --    MAGNESIUM mg/dL  --   --  2 1 1 7  --   --    PHOSPHORUS mg/dL  --   --  4 0 3 1  --   --    ALK PHOS U/L  --   --  84 68  --   --    ALT U/L  --   --  24 20  --   --    AST U/L  --   --  61* 45  --   --    GLUCOSE, ISTAT mg/dl  --   --   --   --   --  132    < > = values in this interval not displayed  Previous work up:  See previous notes      Portions of the record may have been created with voice recognition software  Occasional wrong word or "sound a like" substitutions may have occurred due to the inherent limitations of voice recognition software  Read the chart carefully and recognize, using context, where substitutions have occurred  If you have any questions, please contact the dictating provider

## 2021-02-14 NOTE — PLAN OF CARE
Problem: Prexisting or High Potential for Compromised Skin Integrity  Goal: Skin integrity is maintained or improved  Description: INTERVENTIONS:  - Identify patients at risk for skin breakdown  - Assess and monitor skin integrity  - Assess and monitor nutrition and hydration status  - Monitor labs   - Assess for incontinence   - Turn and reposition patient  - Assist with mobility/ambulation  - Relieve pressure over bony prominences  - Avoid friction and shearing  - Provide appropriate hygiene as needed including keeping skin clean and dry  - Evaluate need for skin moisturizer/barrier cream  - Collaborate with interdisciplinary team   - Patient/family teaching  - Consider wound care consult   Outcome: Progressing     Problem: PAIN - ADULT  Goal: Verbalizes/displays adequate comfort level or baseline comfort level  Description: Interventions:  - Encourage patient to monitor pain and request assistance  - Assess pain using appropriate pain scale  - Administer analgesics based on type and severity of pain and evaluate response  - Implement non-pharmacological measures as appropriate and evaluate response  - Consider cultural and social influences on pain and pain management  - Notify physician/advanced practitioner if interventions unsuccessful or patient reports new pain  Outcome: Progressing     Problem: INFECTION - ADULT  Goal: Absence or prevention of progression during hospitalization  Description: INTERVENTIONS:  - Assess and monitor for signs and symptoms of infection  - Monitor lab/diagnostic results  - Monitor all insertion sites, i e  indwelling lines, tubes, and drains  - Monitor endotracheal if appropriate and nasal secretions for changes in amount and color  - [place] appropriate cooling/warming therapies per order  - Administer medications as ordered  - Instruct and encourage patient and family to use good hand hygiene technique  - Identify and instruct in appropriate isolation precautions for identified infection/condition  Outcome: Progressing  Goal: Absence of fever/infection during neutropenic period  Description: INTERVENTIONS:  - Monitor WBC    Outcome: Progressing     Problem: SAFETY ADULT  Goal: Patient will remain free of falls  Description: INTERVENTIONS:  - Assess patient frequently for physical needs  -  Identify cognitive and physical deficits and behaviors that affect risk of falls    -  [place] fall precautions as indicated by assessment   - Educate patient/family on patient safety including physical limitations  - Instruct patient to call for assistance with activity based on assessment  - Modify environment to reduce risk of injury  - Consider OT/PT consult to assist with strengthening/mobility  Outcome: Progressing  Goal: Maintain or return to baseline ADL function  Description: INTERVENTIONS:  -  Assess patient's ability to carry out ADLs; assess patient's baseline for ADL function and identify physical deficits which impact ability to perform ADLs (bathing, care of mouth/teeth, toileting, grooming, dressing, etc )  - Assess/evaluate cause of self-care deficits   - Assess range of motion  - Assess patient's mobility; develop plan if impaired  - Assess patient's need for assistive devices and provide as appropriate  - Encourage maximum independence but intervene and supervise when necessary  - Involve family in performance of ADLs  - Assess for home care needs following discharge   - Consider OT consult to assist with ADL evaluation and planning for discharge  - Provide patient education as appropriate  Outcome: Progressing  Goal: Maintain or return mobility status to optimal level  Description: INTERVENTIONS:  - Assess patient's baseline mobility status (ambulation, transfers, stairs, etc )    - Identify cognitive and physical deficits and behaviors that affect mobility  - Identify mobility aids required to assist with transfers and/or ambulation (gait belt, sit-to-stand, lift, walker, cane, etc )  - [place] fall precautions as indicated by assessment  - Record patient progress and toleration of activity level on Mobility SBAR; progress patient to next Phase/Stage  - Instruct patient to call for assistance with activity based on assessment  - Consider rehabilitation consult to assist with strengthening/weightbearing, etc   Outcome: Progressing     Problem: DISCHARGE PLANNING  Goal: Discharge to home or other facility with appropriate resources  Description: INTERVENTIONS:  - Identify barriers to discharge w/patient and caregiver  - Arrange for needed discharge resources and transportation as appropriate  - Identify discharge learning needs (meds, wound care, etc )  - Arrange for interpretive services to assist at discharge as needed  - Refer to Case Management Department for coordinating discharge planning if the patient needs post-hospital services based on physician/advanced practitioner order or complex needs related to functional status, cognitive ability, or social support system  Outcome: Progressing     Problem: Knowledge Deficit  Goal: Patient/family/caregiver demonstrates understanding of disease process, treatment plan, medications, and discharge instructions  Description: Complete learning assessment and assess knowledge base  Interventions:  - Provide teaching at level of understanding  - Provide teaching via preferred learning methods  Outcome: Progressing     Problem: Neurological Deficit  Goal: Neurological status is stable or improving  Description: Interventions:  - Monitor and assess patient's level of consciousness, motor function, sensory function, and level of assistance needed for ADLs  - Monitor and report changes from baseline  Collaborate with interdisciplinary team to initiate plan and implement interventions as ordered  - Provide and maintain a safe environment  - Consider seizure precautions  - Consider fall precautions  - Consider aspiration precautions    - Consider bleeding precautions  Outcome: Progressing     Problem: Activity Intolerance/Impaired Mobility  Goal: Mobility/activity is maintained at optimum level for patient  Description: Interventions:  - Assess and monitor patient  barriers to mobility and need for assistive/adaptive devices  - Assess patient's emotional response to limitations  - Collaborate with interdisciplinary team and initiate plans and interventions as ordered  - Encourage independent activity per ability   - Maintain proper body alignment  - Perform active/passive rom as tolerated/ordered  - Plan activities to conserve energy   - Turn patient as appropriate  Outcome: Progressing     Problem: Communication Impairment  Goal: Ability to express needs and understand communication  Description: Assess patient's communication skills and ability to understand information  Patient will demonstrate use of effective communication techniques, alternative methods of communication and understanding even if not able to speak  - Encourage communication and provide alternate methods of communication as needed  - Collaborate with case management/ for discharge needs  - Include patient/family/caregiver in decisions related to communication  Outcome: Progressing     Problem: Potential for Aspiration  Goal: Non-ventilated patient's risk of aspiration is minimized  Description: Assess and monitor vital signs, respiratory status, and labs (WBC)  Monitor for signs of aspiration (tachypnea, cough, rales, wheezing, cyanosis, fever)  - Assess and monitor patient's ability to swallow  - Place patient up in chair to eat if possible  - HOB up at 90 degrees to eat if unable to get patient up into chair   - Supervise patient during oral intake  - Instruct patient/ family to take small bites  - Instruct patient/ family to take small single sips when taking liquids    - Follow patient-specific strategies generated by speech pathologist   Outcome: Progressing     Problem: Nutrition  Goal: Nutrition/Hydration status is improving  Description: Monitor and assess patient's nutrition/hydration status for malnutrition (ex- brittle hair, bruises, dry skin, pale skin and conjunctiva, muscle wasting, smooth red tongue, and disorientation)  Collaborate with interdisciplinary team and initiate plan and interventions as ordered  Monitor patient's weight and dietary intake as ordered or per policy  Utilize nutrition screening tool and intervene per policy  Determine patient's food preferences and provide high-protein, high-caloric foods as appropriate  - Assist patient with eating   - Allow adequate time for meals   - Encourage patient to take dietary supplement as ordered  - Collaborate with clinical nutritionist   - Include patient/family/caregiver in decisions related to nutrition  Outcome: Progressing     Problem: Potential for Falls  Goal: Patient will remain free of falls  Description: INTERVENTIONS:  - Assess patient frequently for physical needs  -  Identify cognitive and physical deficits and behaviors that affect risk of falls    -  [place] fall precautions as indicated by assessment   - Educate patient/family on patient safety including physical limitations  - Instruct patient to call for assistance with activity based on assessment  - Modify environment to reduce risk of injury  - Consider OT/PT consult to assist with strengthening/mobility  Outcome: Progressing

## 2021-02-14 NOTE — ASSESSMENT & PLAN NOTE
· Creatinine 3 08 2/12/21, up from 1 91 the day prior, and baseline appears to be around 1 0  · Patient had CTA as above  · Nephrology consult appreciated, I discussed with them today  · Lovenox renally dosed as above   · On Lasix GTT per Nephrology  BMP monitoring  Creatinine worsened again today  · Continue telemetry   · Avoid hypotension   Patient on midodrine per Neuro as above

## 2021-02-15 ENCOUNTER — APPOINTMENT (INPATIENT)
Dept: RADIOLOGY | Facility: HOSPITAL | Age: 86
DRG: 064 | End: 2021-02-15
Payer: COMMERCIAL

## 2021-02-15 LAB
ANION GAP SERPL CALCULATED.3IONS-SCNC: 12 MMOL/L (ref 4–13)
BACTERIA UR QL AUTO: ABNORMAL /HPF
BILIRUB UR QL STRIP: NEGATIVE
BUN SERPL-MCNC: 84 MG/DL (ref 5–25)
CALCIUM SERPL-MCNC: 10.2 MG/DL (ref 8.3–10.1)
CHLORIDE SERPL-SCNC: 102 MMOL/L (ref 100–108)
CLARITY UR: CLEAR
CO2 SERPL-SCNC: 24 MMOL/L (ref 21–32)
COLOR UR: YELLOW
CREAT SERPL-MCNC: 6.7 MG/DL (ref 0.6–1.3)
ERYTHROCYTE [DISTWIDTH] IN BLOOD BY AUTOMATED COUNT: 13.2 % (ref 11.6–15.1)
GFR SERPL CREATININE-BSD FRML MDRD: 7 ML/MIN/1.73SQ M
GLUCOSE SERPL-MCNC: 102 MG/DL (ref 65–140)
GLUCOSE SERPL-MCNC: 104 MG/DL (ref 65–140)
GLUCOSE SERPL-MCNC: 106 MG/DL (ref 65–140)
GLUCOSE SERPL-MCNC: 90 MG/DL (ref 65–140)
GLUCOSE SERPL-MCNC: 95 MG/DL (ref 65–140)
GLUCOSE UR STRIP-MCNC: NEGATIVE MG/DL
HCT VFR BLD AUTO: 31.8 % (ref 36.5–49.3)
HGB BLD-MCNC: 10.5 G/DL (ref 12–17)
HGB UR QL STRIP.AUTO: ABNORMAL
HYALINE CASTS #/AREA URNS LPF: ABNORMAL /LPF
KETONES UR STRIP-MCNC: NEGATIVE MG/DL
LEUKOCYTE ESTERASE UR QL STRIP: NEGATIVE
MCH RBC QN AUTO: 32.3 PG (ref 26.8–34.3)
MCHC RBC AUTO-ENTMCNC: 33 G/DL (ref 31.4–37.4)
MCV RBC AUTO: 98 FL (ref 82–98)
NITRITE UR QL STRIP: NEGATIVE
NON-SQ EPI CELLS URNS QL MICRO: ABNORMAL /HPF
PH UR STRIP.AUTO: 7 [PH]
PLATELET # BLD AUTO: 212 THOUSANDS/UL (ref 149–390)
PMV BLD AUTO: 9.8 FL (ref 8.9–12.7)
POTASSIUM SERPL-SCNC: 4.9 MMOL/L (ref 3.5–5.3)
PROT UR STRIP-MCNC: ABNORMAL MG/DL
RBC # BLD AUTO: 3.25 MILLION/UL (ref 3.88–5.62)
RBC #/AREA URNS AUTO: ABNORMAL /HPF
SODIUM SERPL-SCNC: 138 MMOL/L (ref 136–145)
SP GR UR STRIP.AUTO: 1.01 (ref 1–1.03)
UROBILINOGEN UR QL STRIP.AUTO: 0.2 E.U./DL
WBC # BLD AUTO: 9.94 THOUSAND/UL (ref 4.31–10.16)
WBC #/AREA URNS AUTO: ABNORMAL /HPF

## 2021-02-15 PROCEDURE — 76770 US EXAM ABDO BACK WALL COMP: CPT

## 2021-02-15 PROCEDURE — 80048 BASIC METABOLIC PNL TOTAL CA: CPT | Performed by: PHYSICIAN ASSISTANT

## 2021-02-15 PROCEDURE — 81001 URINALYSIS AUTO W/SCOPE: CPT | Performed by: INTERNAL MEDICINE

## 2021-02-15 PROCEDURE — 97116 GAIT TRAINING THERAPY: CPT

## 2021-02-15 PROCEDURE — 99233 SBSQ HOSP IP/OBS HIGH 50: CPT | Performed by: INTERNAL MEDICINE

## 2021-02-15 PROCEDURE — 97530 THERAPEUTIC ACTIVITIES: CPT

## 2021-02-15 PROCEDURE — 85027 COMPLETE CBC AUTOMATED: CPT | Performed by: PHYSICIAN ASSISTANT

## 2021-02-15 PROCEDURE — 84166 PROTEIN E-PHORESIS/URINE/CSF: CPT | Performed by: INTERNAL MEDICINE

## 2021-02-15 PROCEDURE — 97535 SELF CARE MNGMENT TRAINING: CPT

## 2021-02-15 PROCEDURE — 99223 1ST HOSP IP/OBS HIGH 75: CPT | Performed by: UROLOGY

## 2021-02-15 PROCEDURE — 82948 REAGENT STRIP/BLOOD GLUCOSE: CPT

## 2021-02-15 PROCEDURE — 86335 IMMUNFIX E-PHORSIS/URINE/CSF: CPT | Performed by: INTERNAL MEDICINE

## 2021-02-15 PROCEDURE — 74176 CT ABD & PELVIS W/O CONTRAST: CPT

## 2021-02-15 PROCEDURE — G1004 CDSM NDSC: HCPCS

## 2021-02-15 PROCEDURE — 99232 SBSQ HOSP IP/OBS MODERATE 35: CPT | Performed by: STUDENT IN AN ORGANIZED HEALTH CARE EDUCATION/TRAINING PROGRAM

## 2021-02-15 RX ORDER — SODIUM CHLORIDE 9 MG/ML
60 INJECTION, SOLUTION INTRAVENOUS CONTINUOUS
Status: DISCONTINUED | OUTPATIENT
Start: 2021-02-15 | End: 2021-02-16

## 2021-02-15 RX ADMIN — CARBIDOPA AND LEVODOPA 2.5 MG: 50; 200 TABLET, EXTENDED RELEASE ORAL at 06:23

## 2021-02-15 RX ADMIN — ATORVASTATIN CALCIUM 40 MG: 40 TABLET, FILM COATED ORAL at 17:12

## 2021-02-15 RX ADMIN — SENNOSIDES 8.6 MG: 8.6 TABLET, FILM COATED ORAL at 09:29

## 2021-02-15 RX ADMIN — ENOXAPARIN SODIUM 60 MG: 80 INJECTION SUBCUTANEOUS at 16:30

## 2021-02-15 RX ADMIN — DOCUSATE SODIUM 100 MG: 100 CAPSULE, LIQUID FILLED ORAL at 17:12

## 2021-02-15 RX ADMIN — DOCUSATE SODIUM 100 MG: 100 CAPSULE, LIQUID FILLED ORAL at 09:29

## 2021-02-15 RX ADMIN — SODIUM CHLORIDE, SODIUM GLUCONATE, SODIUM ACETATE, POTASSIUM CHLORIDE, MAGNESIUM CHLORIDE, SODIUM PHOSPHATE, DIBASIC, AND POTASSIUM PHOSPHATE 100 ML/HR: .53; .5; .37; .037; .03; .012; .00082 INJECTION, SOLUTION INTRAVENOUS at 03:53

## 2021-02-15 RX ADMIN — MAGNESIUM OXIDE TAB 400 MG (241.3 MG ELEMENTAL MG) 400 MG: 400 (241.3 MG) TAB at 09:29

## 2021-02-15 RX ADMIN — Medication 20 MG/HR: at 03:53

## 2021-02-15 RX ADMIN — SODIUM CHLORIDE 60 ML/HR: 0.9 INJECTION, SOLUTION INTRAVENOUS at 09:28

## 2021-02-15 NOTE — QUICK NOTE
Follow-up renal ultrasound demonstrated bilateral hydronephrosis  This may be the etiology for the acute kidney injury  We will consult Urology urgently for treatment  Discussed with SLIM

## 2021-02-15 NOTE — OCCUPATIONAL THERAPY NOTE
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 [facility])    ATN (acute tubular necrosis) (HCC)    VELVET (acute kidney injury) ([facility] [address] )          02/15/21 1014   OT Last Visit   OT Visit Date 02/15/21   Note Type   Note Type Treatment   Restrictions/Precautions   Weight Bearing Precautions Per Order No   Other Precautions Cognitive; Chair Alarm; Bed Alarm; Fall Risk;Multiple lines;Telemetry   General   Response to Previous Treatment Patient with no complaints from previous session   Pain Assessment   Pain Assessment Tool Pain Assessment not indicated - pt denies pain   Pain Score No Pain   ADL   Grooming Assistance 4  Minimal Assistance   Grooming Deficit Wash/dry hands   Grooming Comments Pt with poor sequencing, requires assist for using soap, shutting off water, and throwing towel in the garbage    LB Dressing Assistance 3  Moderate Assistance   LB Dressing Deficit Don/doff R sock; Don/doff L sock   Toileting Assistance  5  Supervision/Setup   Toileting Deficit Perineal hygiene   Functional Standing Tolerance   Time ~ 3 min    Activity fnxl ambulation    Comments RW   Bed Mobility   Rolling R 4  Minimal assistance   Additional items Assist x 2; Increased time required;HOB elevated   Transfers   Sit to Stand 3  Moderate assistance   Additional items Assist x 1; Increased time required   Stand to Sit 3  Moderate assistance   Additional items Assist x 1; Increased time required   Toilet transfer 3  Moderate assistance   Additional items Assist x 2; Increased time required   Additional Comments Transfers with RW    Functional Mobility   Functional Mobility 3  Moderate assistance   Additional Comments Ax1 for safety    Additional items Rolling walker   Cognition   Overall Cognitive Status Impaired Arousal/Participation Alert; Responsive; Cooperative   Attention Attends with cues to redirect   Orientation Level Oriented to person   Memory Decreased short term memory;Decreased recall of precautions   Following Commands Follows one step commands with increased time or repetition   Comments Pt pleasant and cooperative t/o session  Demonstrates expressive aphasia and pleasantly confused    Activity Tolerance   Activity Tolerance Patient limited by fatigue   Medical Staff Made Aware PT Cindi    Assessment   Assessment Patient participated in Skilled OT session this date with interventions consisting of ADL re training with the use of correct body mechnaics, safety awareness and fall prevention techniques,  therapeutic activities to: increase activity tolerance and increase OOB/ sitting tolerance   Upon arrival patient was found supine in bed  Pt with expressive aphasia during session  Pt demonstrated the following tasks: MOD A LBD to don socks, MIN A x 2 sup to sit, MOD A x 1 sit to stand and fnxl ambulation (with RW), MOD A x 2 toilet transfer, S for toileting hygiene  Pt required MIN A for hand washing post toileting - difficulty sequencing and remembering steps to handwashing routine  Patient requiring frequent rest periods  Pt limited by endurance, independence, activity tolerance, and cognition  Patient continues to be functioning below baseline level, occupational performance remains limited secondary to factors listed above and increased risk for falls and injury  From OT standpoint, recommendation at time of d/c would be Short Term Rehab  Patient to benefit from continued Occupational Therapy treatment while in the hospital to address deficits as defined above and maximize level of functional independence with ADLs and functional mobility  Pt was left in chair with alarm on after session with all current needs met   The patient's raw score on the AM-PAC Daily Activity inpatient short form is 16, standardized score is 35 96, less than 39 4  Patients at this level are likely to benefit from DC to post-acute rehabilitation services  Please refer to the recommendation of the Occupational Therapist for safe DC planning  Plan   Treatment Interventions ADL retraining;Functional transfer training;UE strengthening/ROM; Endurance training;Cognitive reorientation;Patient/family training;Equipment evaluation/education; Neuromuscular reeducation; Compensatory technique education;Continued evaluation; Energy conservation; Activityengagement   Goal Expiration Date 02/24/21   OT Treatment Day 1   OT Frequency 3-5x/wk   Recommendation   OT Discharge Recommendation Post-Acute Rehabilitation Services   OT - OK to Discharge Yes  (when medically stable )   AM-PAC Daily Activity Inpatient   Lower Body Dressing 2   Bathing 2   Toileting 2   Upper Body Dressing 3   Grooming 3   Eating 4   Daily Activity Raw Score 16   Daily Activity Standardized Score (Calc for Raw Score >=11) 35 96   AM-PAC Applied Cognition Inpatient   Following a Speech/Presentation 1   Understanding Ordinary Conversation 3   Taking Medications 1   Remembering Where Things Are Placed or Put Away 1   Remembering List of 4-5 Errands 1   Taking Care of Complicated Tasks 1   Applied Cognition Raw Score 8   Applied Cognition Standardized Score 19 32       Meredith Found, MS, OTR/L

## 2021-02-15 NOTE — PLAN OF CARE
Problem: OCCUPATIONAL THERAPY ADULT  Goal: Performs self-care activities at highest level of function for planned discharge setting  See evaluation for individualized goals  Description: Treatment Interventions: ADL retraining, Functional transfer training, UE strengthening/ROM, Endurance training, Patient/family training, Cognitive reorientation, Equipment evaluation/education, Fine motor coordination activities, Compensatory technique education, Activityengagement, Energy conservation          See flowsheet documentation for full assessment, interventions and recommendations  Outcome: Progressing  Note: Limitation: Decreased ADL status, Decreased UE strength, Decreased Safe judgement during ADL, Decreased cognition, Decreased endurance, Decreased self-care trans, Decreased high-level ADLs  Prognosis: Good  Assessment: Patient participated in Skilled OT session this date with interventions consisting of ADL re training with the use of correct body mechnaics, safety awareness and fall prevention techniques,  therapeutic activities to: increase activity tolerance and increase OOB/ sitting tolerance   Upon arrival patient was found supine in bed  Pt with expressive aphasia during session  Pt demonstrated the following tasks: MOD A LBD to don socks, MIN A x 2 sup to sit, MOD A x 1 sit to stand and fnxl ambulation (with RW), MOD A x 2 toilet transfer, S for toileting hygiene  Pt required MIN A for hand washing post toileting - difficulty sequencing and remembering steps to handwashing routine  Patient requiring frequent rest periods  Pt limited by endurance, independence, activity tolerance, and cognition  Patient continues to be functioning below baseline level, occupational performance remains limited secondary to factors listed above and increased risk for falls and injury  From OT standpoint, recommendation at time of d/c would be Short Term Rehab     Patient to benefit from continued Occupational Therapy treatment while in the hospital to address deficits as defined above and maximize level of functional independence with ADLs and functional mobility  Pt was left in chair with alarm on after session with all current needs met  The patient's raw score on the AM-PAC Daily Activity inpatient short form is 16, standardized score is 35 96, less than 39 4  Patients at this level are likely to benefit from DC to post-acute rehabilitation services  Please refer to the recommendation of the Occupational Therapist for safe DC planning       OT Discharge Recommendation: Post-Acute Rehabilitation Services  OT - OK to Discharge: Yes(when medically stable )

## 2021-02-15 NOTE — ASSESSMENT & PLAN NOTE
· Creatinine 3 08 2/12/21, up from 1 91 the day prior, and baseline appears to be around 1 0  · Suspected secondary to ATN per Nephrology  Patient had CTA as above  · Nephrology consult appreciated, I discussed with them today  · Lovenox renally dosed as above   · On Lasix GTT per Nephrology  BMP monitoring  Creatinine worsened again today - may need RRT/HD  Checking US  · Spangler cath was placed for more accurate I&Os  · Continue telemetry   · Avoid hypotension   Patient on midodrine per Neuro as above

## 2021-02-15 NOTE — PLAN OF CARE
Problem: Prexisting or High Potential for Compromised Skin Integrity  Goal: Skin integrity is maintained or improved  Description: INTERVENTIONS:  - Identify patients at risk for skin breakdown  - Assess and monitor skin integrity  - Assess and monitor nutrition and hydration status  - Monitor labs   - Assess for incontinence   - Turn and reposition patient  - Assist with mobility/ambulation  - Relieve pressure over bony prominences  - Avoid friction and shearing  - Provide appropriate hygiene as needed including keeping skin clean and dry  - Evaluate need for skin moisturizer/barrier cream  - Collaborate with interdisciplinary team   - Patient/family teaching  - Consider wound care consult   Outcome: Progressing     Problem: PAIN - ADULT  Goal: Verbalizes/displays adequate comfort level or baseline comfort level  Description: Interventions:  - Encourage patient to monitor pain and request assistance  - Assess pain using appropriate pain scale  - Administer analgesics based on type and severity of pain and evaluate response  - Implement non-pharmacological measures as appropriate and evaluate response  - Consider cultural and social influences on pain and pain management  - Notify physician/advanced practitioner if interventions unsuccessful or patient reports new pain  Outcome: Progressing     Problem: INFECTION - ADULT  Goal: Absence or prevention of progression during hospitalization  Description: INTERVENTIONS:  - Assess and monitor for signs and symptoms of infection  - Monitor lab/diagnostic results  - Monitor all insertion sites, i e  indwelling lines, tubes, and drains  - Monitor endotracheal if appropriate and nasal secretions for changes in amount and color  - [place] appropriate cooling/warming therapies per order  - Administer medications as ordered  - Instruct and encourage patient and family to use good hand hygiene technique  - Identify and instruct in appropriate isolation precautions for identified infection/condition  Outcome: Progressing  Goal: Absence of fever/infection during neutropenic period  Description: INTERVENTIONS:  - Monitor WBC    Outcome: Progressing     Problem: SAFETY ADULT  Goal: Patient will remain free of falls  Description: INTERVENTIONS:  - Assess patient frequently for physical needs  -  Identify cognitive and physical deficits and behaviors that affect risk of falls    -  [place] fall precautions as indicated by assessment   - Educate patient/family on patient safety including physical limitations  - Instruct patient to call for assistance with activity based on assessment  - Modify environment to reduce risk of injury  - Consider OT/PT consult to assist with strengthening/mobility  Outcome: Progressing  Goal: Maintain or return to baseline ADL function  Description: INTERVENTIONS:  -  Assess patient's ability to carry out ADLs; assess patient's baseline for ADL function and identify physical deficits which impact ability to perform ADLs (bathing, care of mouth/teeth, toileting, grooming, dressing, etc )  - Assess/evaluate cause of self-care deficits   - Assess range of motion  - Assess patient's mobility; develop plan if impaired  - Assess patient's need for assistive devices and provide as appropriate  - Encourage maximum independence but intervene and supervise when necessary  - Involve family in performance of ADLs  - Assess for home care needs following discharge   - Consider OT consult to assist with ADL evaluation and planning for discharge  - Provide patient education as appropriate  Outcome: Progressing  Goal: Maintain or return mobility status to optimal level  Description: INTERVENTIONS:  - Assess patient's baseline mobility status (ambulation, transfers, stairs, etc )    - Identify cognitive and physical deficits and behaviors that affect mobility  - Identify mobility aids required to assist with transfers and/or ambulation (gait belt, sit-to-stand, lift, walker, cane, etc )  - [place] fall precautions as indicated by assessment  - Record patient progress and toleration of activity level on Mobility SBAR; progress patient to next Phase/Stage  - Instruct patient to call for assistance with activity based on assessment  - Consider rehabilitation consult to assist with strengthening/weightbearing, etc   Outcome: Progressing     Problem: DISCHARGE PLANNING  Goal: Discharge to home or other facility with appropriate resources  Description: INTERVENTIONS:  - Identify barriers to discharge w/patient and caregiver  - Arrange for needed discharge resources and transportation as appropriate  - Identify discharge learning needs (meds, wound care, etc )  - Arrange for interpretive services to assist at discharge as needed  - Refer to Case Management Department for coordinating discharge planning if the patient needs post-hospital services based on physician/advanced practitioner order or complex needs related to functional status, cognitive ability, or social support system  Outcome: Progressing     Problem: Knowledge Deficit  Goal: Patient/family/caregiver demonstrates understanding of disease process, treatment plan, medications, and discharge instructions  Description: Complete learning assessment and assess knowledge base  Interventions:  - Provide teaching at level of understanding  - Provide teaching via preferred learning methods  Outcome: Progressing     Problem: Neurological Deficit  Goal: Neurological status is stable or improving  Description: Interventions:  - Monitor and assess patient's level of consciousness, motor function, sensory function, and level of assistance needed for ADLs  - Monitor and report changes from baseline  Collaborate with interdisciplinary team to initiate plan and implement interventions as ordered  - Provide and maintain a safe environment  - Consider seizure precautions  - Consider fall precautions  - Consider aspiration precautions    - Consider bleeding precautions  Outcome: Progressing     Problem: Activity Intolerance/Impaired Mobility  Goal: Mobility/activity is maintained at optimum level for patient  Description: Interventions:  - Assess and monitor patient  barriers to mobility and need for assistive/adaptive devices  - Assess patient's emotional response to limitations  - Collaborate with interdisciplinary team and initiate plans and interventions as ordered  - Encourage independent activity per ability   - Maintain proper body alignment  - Perform active/passive rom as tolerated/ordered  - Plan activities to conserve energy   - Turn patient as appropriate  Outcome: Progressing     Problem: Communication Impairment  Goal: Ability to express needs and understand communication  Description: Assess patient's communication skills and ability to understand information  Patient will demonstrate use of effective communication techniques, alternative methods of communication and understanding even if not able to speak  - Encourage communication and provide alternate methods of communication as needed  - Collaborate with case management/ for discharge needs  - Include patient/family/caregiver in decisions related to communication  Outcome: Progressing     Problem: Potential for Aspiration  Goal: Non-ventilated patient's risk of aspiration is minimized  Description: Assess and monitor vital signs, respiratory status, and labs (WBC)  Monitor for signs of aspiration (tachypnea, cough, rales, wheezing, cyanosis, fever)  - Assess and monitor patient's ability to swallow  - Place patient up in chair to eat if possible  - HOB up at 90 degrees to eat if unable to get patient up into chair   - Supervise patient during oral intake  - Instruct patient/ family to take small bites  - Instruct patient/ family to take small single sips when taking liquids    - Follow patient-specific strategies generated by speech pathologist   Outcome: Progressing     Problem: Nutrition  Goal: Nutrition/Hydration status is improving  Description: Monitor and assess patient's nutrition/hydration status for malnutrition (ex- brittle hair, bruises, dry skin, pale skin and conjunctiva, muscle wasting, smooth red tongue, and disorientation)  Collaborate with interdisciplinary team and initiate plan and interventions as ordered  Monitor patient's weight and dietary intake as ordered or per policy  Utilize nutrition screening tool and intervene per policy  Determine patient's food preferences and provide high-protein, high-caloric foods as appropriate  - Assist patient with eating   - Allow adequate time for meals   - Encourage patient to take dietary supplement as ordered  - Collaborate with clinical nutritionist   - Include patient/family/caregiver in decisions related to nutrition  Outcome: Progressing     Problem: Potential for Falls  Goal: Patient will remain free of falls  Description: INTERVENTIONS:  - Assess patient frequently for physical needs  -  Identify cognitive and physical deficits and behaviors that affect risk of falls    -  [place] fall precautions as indicated by assessment   - Educate patient/family on patient safety including physical limitations  - Instruct patient to call for assistance with activity based on assessment  - Modify environment to reduce risk of injury  - Consider OT/PT consult to assist with strengthening/mobility  Outcome: Progressing

## 2021-02-15 NOTE — ASSESSMENT & PLAN NOTE
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

## 2021-02-15 NOTE — CONSULTS
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  He was referred to [facility] for treatment where he is entered into a clinical trial   He was last seen in January and reported an episode of gross hematuria  He was to return to the office in 3 weeks with imaging prior to visit  He was to return to the office in 3 weeks with imaging prior to visit  He presented to [address] [place] [address] on February 10th following a fall  He had been on Xarelto for pulmonary embolism  He had stroke-like symptoms CT was negative  He had worsening expressive aphasia with a consistently negative CT scan and was also seen by Neurology for continued stroke pathway  On presentation his creatinine was 1 6  It had increased to 3 on February 12 and now his creatinine is 6 70  A Spangler catheter was inserted and an ultrasound showed mild bilateral hydronephrosis  He has no significant back pain or gross hematuria  WBCs 9 97   H&H 10 5 and 31 8      Past Medical, Past Surgical History:   No past medical history on file :    No past surgical history on file :    Medications, Allergies:     Current Facility-Administered Medications:     acetaminophen (TYLENOL) tablet 650 mg, 650 mg, Oral, Q4H PRN, Greg Stone MD    aluminum-magnesium hydroxide-simethicone (MYLANTA) oral suspension 30 mL, 30 mL, Oral, Q6H PRN, Greg Stone MD    atorvastatin (LIPITOR) tablet 40 mg, 40 mg, Oral, QPM, Hilary Rajput MD, 40 mg at 02/14/21 1644    docusate sodium (COLACE) capsule 100 mg, 100 mg, Oral, BID, Hilary Rajput MD, 100 mg at 02/15/21 0929    enoxaparin (LOVENOX) subcutaneous injection 60 mg, 1 mg/kg, Subcutaneous, Q24H [address], Ok Santos PA-C, 60 mg at 02/14/21 1645    furosemide (LASIX) 500 mg infusion 50 mL, 20 mg/hr, Intravenous, Continuous, Marina Perea MD, Last Rate: 2 mL/hr at 02/15/21 0353, 20 mg/hr at 02/15/21 0353    insulin lispro (HumaLOG) 100 units/mL subcutaneous injection 1-5 Units, 1-5 Units, Subcutaneous, TID AC **AND** Fingerstick Glucose (POCT), , , TID AC, Hilary Rajput MD    magnesium oxide (MAG-OX) tablet 400 mg, 400 mg, Oral, Daily, MILAGRO Rice, 400 mg at 02/15/21 8582    midodrine (PROAMATINE) tablet 2 5 mg, 2 5 mg, Oral, BID AC, Marina Perea MD, 2 5 mg at 02/15/21 6437    ondansetron (ZOFRAN) injection 4 mg, 4 mg, Intravenous, Q6H PRN, Hilary Rajput MD    senna (SENOKOT) tablet 8 6 mg, 1 tablet, Oral, Daily, Hilary Rajput MD, 8 6 mg at 02/15/21 2556    sodium chloride 0 9 % infusion, 60 mL/hr, Intravenous, Continuous, Marina Perea MD, Last Rate: 60 mL/hr at 02/15/21 0928, 60 mL/hr at 02/15/21 2907    Allergies:  No Known Allergies:    Social and Family History:   Social History:   Social History     Tobacco Use    Smoking status: Not on file   Substance Use Topics    Alcohol use: Not on file    Drug use: Not on file     Social History     Tobacco Use   Smoking Status Not on file       Family History:  No family history on file :     Review of Systems:     General: Fever, chills, or night sweats: negative  Cardiac: Negative for chest pain  Pulmonary: Negative for shortness of breath  Gastrointestinal: Abdominal pain negative  Nausea, vomiting, or diarrhea negative,  Genitourinary: See HPI above  Patient does not have hematuria  All other systems queried were negative      Physical Exam:   General: Patient is pleasant and in NAD  Awake and alert expressive aphasia  /81   Pulse 100   Temp 98 1 °F (36 7 °C)   Resp 18   Ht 5' 10" (1 778 m) Comment: per out pt records  Wt 66 9 kg (147 lb 7 8 oz)   SpO2 93%   BMI 21 16 kg/m²   HEENT:  Conjunctiva are clear  Constitutional:  pleasant and cooperative     no apparent distress  Cardiac: Peripheral edema: negative  Pulmonary: Non-labored breathing  Abdomen: Soft, non-tender, non-distended  No surgical scars  No masses, tenderness, hernias noted  Genitourinary: Negative CVA tenderness, negative suprapubic tenderness  Extremities:  Moves all extremities  Neurological:CNII-XII intact  No numbness or tingling  Essentially non focal neurologic exam  Psychiatric:mood affect and behavior normal    (Male): Penis circumcised, phallus normal, meatus patent  Testicles descended into scrotum bilaterally without masses nor tenderness  No inguinal hernias bilaterally  COX: in place draining clear yellow urine  no clots and       Labs:     Lab Results   Component Value Date    HGB 10 5 (L) 02/15/2021    HCT 31 8 (L) 02/15/2021    WBC 9 94 02/15/2021     02/15/2021   ]    Lab Results   Component Value Date    K 4 9 02/15/2021     02/15/2021    CO2 24 02/15/2021    BUN 84 (H) 02/15/2021    CREATININE 6 70 (H) 02/15/2021    CALCIUM 10 2 (H) 02/15/2021    GLUCOSE 132 02/10/2021   ]    Imaging:   I personally reviewed the images and report of the following studies, and reviewed them with the patient:  RENAL ULTRASOUND      IMPRESSION:     1  Mild to moderate bilateral hydronephrosis, worse on the left  Recommend follow-up with CT of the abdomen and pelvis to assess for any obstructing cause  2   Cox catheter decompresses the bladder limiting evaluation  3   Incidentally noted is a left pleural effusion  ASSESSMENT:   1  Muscle invasive metastatic bladder cancer  2  Cerebral vascular accident  3  Progressive acute kidney injury  4   History pulmonary emboli  5  Mechanical fall        PLAN:   -I reviewed the options of management with the patient and his family  -I explained the significance of hydronephrosis and possible causes  -hopefully his kidney function will improve with placement of Spangler catheter  -if he has persistent or progressive renal failure than a urologic intervention may be indicated these would include cystoscopy with bilateral nephroureteral stent placement or possibly bilateral percutaneous nephrostomies by Interventional Radiology  -continue to trend his serum creatinine and follow-up tomorrow with the [facility]an thor[facility]  Rounding team      Thank you for allowing me to participate in this patients care  Please do not hesitate to call with any additional questions    Bonita Paul PA-C

## 2021-02-15 NOTE — PROGRESS NOTES
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  CKD STAGE 3:  Felt secondary to acute tubular necrosis CT angiography on 02/10  Workup:  -? BASELINE IT WAS 1 6 ON PRESENTATION NO PRIOR RECORDS  -UA:  Small occult blood/1+ proteinuria/2-4 RBCs/2-4 wbc's  -repeat UA  -urine protein creatinine ratio  -OBTAIN RENAL ULTRASOUND  -I WILL PLACE COX CATHETER FOR EXACT MEASUREMENT OF URINE OUTPUT IN THE SETTING SEVERE VELVET  -SERUM CREATININE CONTINUES TO RISE AT A LEVEL OF 6 70    Treatment:   -continue furosemide drip  -place Cox catheter for measurement  -decrease IV fluids as patient is very positive intake and output  -avoid hypotension/hypoperfusion  -avoid nephrotoxic agents and further contrast if possible  -I will contact the family:  Patient may require renal placement therapy/hemodialysis over the next 24-48 hours if no improvement    2  Blood pressure:  Patient is on midodrine  I will place hold parameters over treat  3  Electrolytes all acceptable    4  MBD renal disease:  -elevated calcium:  Avoid calcium products vitamin-D  -check ionized calcium  -check PTH intact level  -if persistent consider further workup    5  Anemia:  -rule out multiple myeloma given worsening renal function :  CHECK SPEP/UPEP/LIGHT CHAIN RATIO  -check iron studies on-check stool for occult blood  -transfuse as needed per primary service for hemoglobin less than 7 0    I discussed the patient's care with:  Bryanna Haile [phone] who is the patient's primary medical oncologist regarding his urothelial cell CA metastatic to the lung      Tecentriq which is an immune checkpoint inhibitor which can cause acute interstitial nephritis  Last treatment was 02/09/2021  If resolution of his obstructive uropathy does not improve his renal function I would treated with steroids  Subjective: The patient is still demonstrating expressive aphasia  Chest pain shortness of breath  He denies any nausea vomiting or Diarrhea  Denies any urinary symptoms although urine output minimal     Objective:     Vitals: Blood pressure 154/85, pulse 89, temperature (!) 97 4 °F (36 3 °C), resp  rate 20, height 5' 10" (1 778 m), weight 64 8 kg (142 lb 13 7 oz), SpO2 96 %  ,Body mass index is 20 5 kg/m²      Weight (last 2 days)     None            Intake/Output Summary (Last 24 hours) at 2/15/2021 0744  Last data filed at 2/15/2021 0353  Gross per 24 hour   Intake 3293 33 ml   Output 305 ml   Net 2988 33 ml            Physical Exam: General:  No acute distress  Skin:  No acute rash  Eyes:  No scleral icterus and noninjected  ENT:  Moist mucous membranes  Neck:  Supple, no jugular venous distention, trachea midline, overall appearance is normal  Chest:  Right basilar crackles otherwise clear to auscultation  CVS:  Regular rate and rhythm, without a rub or gallops  Abdomen:  Normal bowel sounds, soft and nontender and nondistended  Extremities:  No edema, and no cyanosis, no significant arthritic changes  Neuro:  No gross focality  Psych:  Alert and oriented and appropriate                Medications:    Scheduled Meds:  Current Facility-Administered Medications   Medication Dose Route Frequency Provider Last Rate    acetaminophen  650 mg Oral Q4H PRN Ilir Powell MD      aluminum-magnesium hydroxide-simethicone  30 mL Oral Q6H PRN Ilir Powell MD      atorvastatin  40 mg Oral QPM Ilir Powell MD      docusate sodium  100 mg Oral BID Ilir Powell MD      enoxaparin  1 mg/kg Subcutaneous Q24H [facility] Dominic Jones PA-C      furosemide  20 mg/hr Intravenous Continuous Karina Mazariegos MD 20 mg/hr (02/15/21 5198)    insulin lispro  1-5 Units Subcutaneous TID AC Erika Kaplan MD      magnesium oxide  400 mg Oral Daily MILAGRO Butcher      midodrine  2 5 mg Oral BID AC Araceli Crandall PA-C      multi-electrolyte  100 mL/hr Intravenous Continuous MARIA EUGENIA Genao 100 mL/hr (02/15/21 0353)    ondansetron  4 mg Intravenous Q6H PRN Erika Kaplan MD      senna  1 tablet Oral Daily Erika Kaplan MD         PRN Meds:   acetaminophen    aluminum-magnesium hydroxide-simethicone    ondansetron    Continuous Infusions:furosemide, 20 mg/hr, Last Rate: 20 mg/hr (02/15/21 0353)  multi-electrolyte, 100 mL/hr, Last Rate: 100 mL/hr (02/15/21 0353)        Lab, Imaging and other studies: I have personally reviewed pertinent labs    Laboratory Results:  Results from last 7 days   Lab Units 02/15/21  0523 02/14/21  0453 02/13/21  0952 02/12/21  0510 02/11/21  0554 02/10/21  2334 02/10/21  1407 02/10/21  1403   WBC Thousand/uL 9 94 7 62 10 01 9 15 8 63 10 65* 9 98  --    HEMOGLOBIN g/dL 10 5* 9 2* 11 4* 11 5* 8 9* 10 7* 11 4*  --    I STAT HEMOGLOBIN g/dl  --   --   --   --   --   --   --  11 2*   HEMATOCRIT % 31 8* 28 3* 34 7* 35 6* 28 3* 32 7* 36 1*  --    HEMATOCRIT, ISTAT %  --   --   --   --   --   --   --  33*   PLATELETS Thousands/uL 212 176 200 214 152 190 211  --    POTASSIUM mmol/L 4 9 4 6 4 2 4 1 3 5  --  4 1  --    CHLORIDE mmol/L 102 102 104 108 112*  --  105  --    CO2 mmol/L 24 21 23 22 19*  --  25  --    CO2, I-STAT mmol/L  --   --   --   --   --   --   --  28   BUN mg/dL 84* 64* 62* 54* 42*  --  44*  --    CREATININE mg/dL 6 70* 4 45* 4 21* 3 08* 1 91*  --  1 68*  --    CALCIUM mg/dL 10 2* 8 6 10 3* 10 8* 9 2  --  10 7*  --    MAGNESIUM mg/dL  --   --   --  2 1 1 7  --   --   --    PHOSPHORUS mg/dL  --   --   --  4 0 3 1  --   --   --    GLUCOSE, ISTAT mg/dl  --   --   --   --   --   --   --  132     Urinalysis:   Lab Results   Component Value Date    COLORU Yellow 02/12/2021    CLARITYU Clear 02/12/2021 SPECGRAV 1 022 02/12/2021    PHUR 6 0 02/12/2021    LEUKOCYTESUR Negative 02/12/2021    NITRITE Negative 02/12/2021    GLUCOSEU Negative 02/12/2021    KETONESU Negative 02/12/2021    BILIRUBINUR Negative 02/12/2021    BLOODU Small (A) 02/12/2021     ABGs: No results found for: [facility]  Radiology review:     Portions of the record may have been created with voice recognition software  Occasional wrong word or "sound a like" substitutions may have occurred due to the inherent limitations of voice recognition software  Read the chart carefully and recognize, using context, where substitutions have occurred

## 2021-02-15 NOTE — H&P (VIEW-ONLY)
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  He was referred to [facility] for treatment where he is entered into a clinical trial   He was last seen in January and reported an episode of gross hematuria  He was to return to the office in 3 weeks with imaging prior to visit  He was to return to the office in 3 weeks with imaging prior to visit  He presented to [facility] on February 10th following a fall  He had been on Xarelto for pulmonary embolism  He had stroke-like symptoms CT was negative  He had worsening expressive aphasia with a consistently negative CT scan and was also seen by Neurology for continued stroke pathway  On presentation his creatinine was 1 6  It had increased to 3 on February 12 and now his creatinine is 6 70  A Spangler catheter was inserted and an ultrasound showed mild bilateral hydronephrosis  He has no significant back pain or gross hematuria  WBCs 9 97   H&H 10 5 and 31 8      Past Medical, Past Surgical History:   No past medical history on file :    No past surgical history on file :    Medications, Allergies:     Current Facility-Administered Medications:     acetaminophen (TYLENOL) tablet 650 mg, 650 mg, Oral, Q4H PRN, Prosper Stuart MD    aluminum-magnesium hydroxide-simethicone (MYLANTA) oral suspension 30 mL, 30 mL, Oral, Q6H PRN, Prosper Stuart MD    atorvastatin (LIPITOR) tablet 40 mg, 40 mg, Oral, QPM, Erika Kaplan MD, 40 mg at 02/14/21 1644    docusate sodium (COLACE) capsule 100 mg, 100 mg, Oral, BID, Erika Kaplan MD, 100 mg at 02/15/21 0929    enoxaparin (LOVENOX) subcutaneous injection 60 mg, 1 mg/kg, Subcutaneous, Q24H [facility], Amena Bernardo, MARIA EUGENIA, 60 mg at 02/14/21 1645    furosemide (LASIX) 500 mg infusion 50 mL, 20 mg/hr, Intravenous, Continuous, Piedad Mantilla MD, Last Rate: 2 mL/hr at 02/15/21 0353, 20 mg/hr at 02/15/21 0353    insulin lispro (HumaLOG) 100 units/mL subcutaneous injection 1-5 Units, 1-5 Units, Subcutaneous, TID AC **AND** Fingerstick Glucose (POCT), , , TID AC, Erika Kaplan MD    magnesium oxide (MAG-OX) tablet 400 mg, 400 mg, Oral, Daily, MILAGRO Rice, 400 mg at 02/15/21 8271    midodrine (PROAMATINE) tablet 2 5 mg, 2 5 mg, Oral, BID AC, Piedad Mantilla MD, 2 5 mg at 02/15/21 4611    ondansetron (ZOFRAN) injection 4 mg, 4 mg, Intravenous, Q6H PRN, Erika Kaplan MD    senna (SENOKOT) tablet 8 6 mg, 1 tablet, Oral, Daily, Erika Kaplan MD, 8 6 mg at 02/15/21 3654    sodium chloride 0 9 % infusion, 60 mL/hr, Intravenous, Continuous, Piedad Mantilla MD, Last Rate: 60 mL/hr at 02/15/21 0928, 60 mL/hr at 02/15/21 2157    Allergies:  No Known Allergies:    Social and Family History:   Social History:   Social History     Tobacco Use    Smoking status: Not on file   Substance Use Topics    Alcohol use: Not on file    Drug use: Not on file     Social History     Tobacco Use   Smoking Status Not on file       Family History:  No family history on file :     Review of Systems:     General: Fever, chills, or night sweats: negative  Cardiac: Negative for chest pain  Pulmonary: Negative for shortness of breath  Gastrointestinal: Abdominal pain negative  Nausea, vomiting, or diarrhea negative,  Genitourinary: See HPI above  Patient does not have hematuria  All other systems queried were negative      Physical Exam:   General: Patient is pleasant and in NAD  Awake and alert expressive aphasia  /81   Pulse 100   Temp 98 1 °F (36 7 °C)   Resp 18   Ht 5' 10" (1 778 m) Comment: per out pt records  Wt 66 9 kg (147 lb 7 8 oz)   SpO2 93%   BMI 21 16 kg/m²   HEENT:  Conjunctiva are clear  Constitutional:  pleasant and cooperative     no apparent distress  Cardiac: Peripheral edema: negative  Pulmonary: Non-labored breathing  Abdomen: Soft, non-tender, non-distended  No surgical scars  No masses, tenderness, hernias noted  Genitourinary: Negative CVA tenderness, negative suprapubic tenderness  Extremities:  Moves all extremities  Neurological:CNII-XII intact  No numbness or tingling  Essentially non focal neurologic exam  Psychiatric:mood affect and behavior normal    (Male): Penis circumcised, phallus normal, meatus patent  Testicles descended into scrotum bilaterally without masses nor tenderness  No inguinal hernias bilaterally  COX: in place draining clear yellow urine  no clots and       Labs:     Lab Results   Component Value Date    HGB 10 5 (L) 02/15/2021    HCT 31 8 (L) 02/15/2021    WBC 9 94 02/15/2021     02/15/2021   ]    Lab Results   Component Value Date    K 4 9 02/15/2021     02/15/2021    CO2 24 02/15/2021    BUN 84 (H) 02/15/2021    CREATININE 6 70 (H) 02/15/2021    CALCIUM 10 2 (H) 02/15/2021    GLUCOSE 132 02/10/2021   ]    Imaging:   I personally reviewed the images and report of the following studies, and reviewed them with the patient:  RENAL ULTRASOUND      IMPRESSION:     1  Mild to moderate bilateral hydronephrosis, worse on the left  Recommend follow-up with CT of the abdomen and pelvis to assess for any obstructing cause  2   Cox catheter decompresses the bladder limiting evaluation  3   Incidentally noted is a left pleural effusion  ASSESSMENT:   1  Muscle invasive metastatic bladder cancer  2  Cerebral vascular accident  3  Progressive acute kidney injury  4   History pulmonary emboli  5  Mechanical fall        PLAN:   -I reviewed the options of management with the patient and his family  -I explained the significance of hydronephrosis and possible causes  -hopefully his kidney function will improve with placement of Spangler catheter  -if he has persistent or progressive renal failure than a urologic intervention may be indicated these would include cystoscopy with bilateral nephroureteral stent placement or possibly bilateral percutaneous nephrostomies by Interventional Radiology  -continue to trend his serum creatinine and follow-up tomorrow with the [facility]  Rounding team      Thank you for allowing me to participate in this patients care  Please do not hesitate to call with any additional questions    Eliane Burrows PA-C

## 2021-02-15 NOTE — PHYSICAL THERAPY NOTE
Physical Therapy Treatment Note    Patient's Name: Derrick Mitchell  : 1935       02/15/21 1015   PT Last Visit   PT Visit Date 02/15/21   Note Type   Note Type Treatment   Pain Assessment   Pain Assessment Tool Pain Assessment not indicated - pt denies pain   Restrictions/Precautions   Weight Bearing Precautions Per Order No   Other Precautions Cognitive; Chair Alarm; Bed Alarm;Multiple lines; Fall Risk;Telemetry   General   Chart Reviewed Yes   Cognition   Overall Cognitive Status Impaired   Attention Attends with cues to redirect   Orientation Level Oriented to person   Memory Decreased recall of precautions;Decreased recall of recent events;Decreased short term memory   Following Commands Follows one step commands with increased time or repetition   Comments Pt with expressive aphasia, difficulty word finding  Pleasantly confused  Bed Mobility   Rolling R 4  Minimal assistance   Additional items Assist x 2;HOB elevated; Increased time required;Verbal cues;LE management   Additional Comments Pt ended session in recliner with alarm on  Transfers   Sit to Stand 3  Moderate assistance   Additional items Assist x 1; Increased time required;Verbal cues   Stand to Sit 3  Moderate assistance   Additional items Assist x 1; Increased time required;Verbal cues   Toilet transfer 3  Moderate assistance   Additional items Assist x 2; Increased time required;Verbal cues   Additional Comments Transfers with RW  VCs for proper hand placement  Ambulation/Elevation   Gait pattern Excessively slow; Short stride;Decreased foot clearance; Inconsistent pebbles   Gait Assistance 3  Moderate assist   Additional items Assist x 1;Verbal cues  (SBA of 2nd for chair follow)   Assistive Device Rolling walker   Distance 40ft + 10 ft (to/from BR)  Pt required VCs for proximity to RW, with limited carryover  Pt had increased difficulty sequencing with turns and side stepping      Balance   Static Sitting Fair +   Dynamic Sitting Fair Static Standing Poor +   Dynamic Standing Poor   Ambulatory Poor -   Activity Tolerance   Activity Tolerance Patient tolerated treatment well   Medical Staff Made Aware OT Ruslanaradannybrie [address] pt okay to see per RN   Exercises   Balance training  Pt able to stand at sink with Paco while washing hands (approx  2 min)   Assessment   Prognosis Good   Problem List Decreased strength;Decreased endurance; Impaired balance;Decreased mobility; Decreased cognition;Decreased safety awareness;Pain   Assessment Pt seen for PT session with a focus on transfers, balance, and gait  Pt was able to ambulate increased distances today, but required VCs for proximity to RW  Pt required increased assistance during gait when turning or side stepping due to difficulty sequencing  Pt is limited mostly at this time by decreased safety awareness, expressive aphasia, and BLE weakness  Time spent toileting and repositioning at end of session  Pt ended session in recliner with chair alarm activated and all needs in reach  Continuing to recommend rehab upon d/c     Barriers to Discharge Decreased caregiver support; Inaccessible home environment   Goals   Patient Goals to get better   STG Expiration Date 02/26/21   Plan   Treatment/Interventions ADL retraining;LE strengthening/ROM; Elevations; Therapeutic exercise;Cognitive reorientation; Endurance training;Bed mobility;Gait training;Spoke to nursing;Spoke to case management;OT   Progress Progressing toward goals   PT Frequency Other (Comment)  (3-5x/wk)   Recommendation   PT Discharge Recommendation Post-Acute Rehabilitation Services   PT - OK to Discharge Yes  (if to rehab)   Merrick 8 in Bed Without Bedrails 4   Lying on Back to Sitting on Edge of Flat Bed 2   Moving Bed to Chair 2   Standing Up From Chair 2   Walk in Room 2   Climb 3-5 Stairs 2   Basic Mobility Inpatient Raw Score 14   Basic Mobility Standardized Score 35 55       Dean Fleischer, PT, DPT

## 2021-02-15 NOTE — PLAN OF CARE
Problem: PHYSICAL THERAPY ADULT  Goal: Performs mobility at highest level of function for planned discharge setting  See evaluation for individualized goals  Description: Treatment/Interventions: Functional transfer training, LE strengthening/ROM, Elevations, Therapeutic exercise, Endurance training, Patient/family training, Equipment eval/education, Bed mobility, Gait training, Spoke to nursing  Equipment Recommended: Roney Acevedo       See flowsheet documentation for full assessment, interventions and recommendations  Outcome: Progressing  Note: Prognosis: Good  Problem List: Decreased strength, Decreased endurance, Impaired balance, Decreased mobility, Decreased cognition, Decreased safety awareness, Pain  Assessment: Pt seen for PT session with a focus on transfers, balance, and gait  Pt was able to ambulate increased distances today, but required VCs for proximity to RW  Pt required increased assistance during gait when turning or side stepping due to difficulty sequencing  Pt is limited mostly at this time by decreased safety awareness, expressive aphasia, and BLE weakness  Time spent toileting and repositioning at end of session  Pt ended session in recliner with chair alarm activated and all needs in reach  Continuing to recommend rehab upon d/c    Barriers to Discharge: Decreased caregiver support, Inaccessible home environment     PT Discharge Recommendation: [id number]     PT - OK to Discharge: Yes(if to rehab)    See flowsheet documentation for full assessment

## 2021-02-15 NOTE — PROGRESS NOTES
Progress Note - Sue Scherer 1935, 81 y o  male MRN: [id number]    Unit/Bed#: [facility] 904-01 Encounter: [id number]    Primary Care Provider: Angle Jeff MD   Date and time admitted to hospital: 2/10/2021  1:55 PM    2:54PM UPDATE: Discussed with Nephrologist - ultrasound kidney and bladder revealed mild-to-moderate bilateral hydro, worse on the left  Patient has Spangler catheter in  Urology consulted  I attempted to call the patient's wife again this afternoon, at both the home and mobile numbers, without response  Nephrologist informed me he is waiting to hear back from Jessica Yun  Discussed with my attending, Dr Mylene Caruso, who will see the patient  * CVA (cerebral vascular accident) [facility])  Assessment & Plan  · Per record review, on 2/10/21, patient was noted to be expressively aphasic, last known well at noon that day, with aphasia noted right after a fall  · Did not receive tPA due to Xarelto use  · Initial CT head with microangiopathic changes, no acute intracranial abnormality  CTA head/neck with left distal M2 and M3 occlusion however no occlusion was noted by Neurosurgeon per Neurology records  Repeat CT head from 2/10/21 due to worsening aphasia with no evidence of acute vascular territorial infarction, intracranial hemorrhage or mass effect  · MRI brain revealed cluster of acute to early subacute embolic infarcts in the left MCA territory, within the inferior parietal temporal region  Few scattered embolic infarcts in the frontoparietal region and right cerebellar hemisphere  Possible central/cardiac source  · Per Neurology on 2/12/21 the patient's heparin GTT was discontinued and he switched to Lovenox (1mg/kg Q24hr, dosing per Neuro/Nephro/Pharmacy)  Neurology has been attempting to reach patient's Oncology team  Lovenox dosing per Neurology/Nephrology  · Continue goal permissive hypertension per Neuro, -160   Patient on midodrine per Neuro      VELVET (acute kidney injury) [facility])  Assessment & Plan  · Creatinine 3 08 2/12/21, up from 1 91 the day prior, and baseline appears to be around 1 0  · Suspected secondary to ATN per Nephrology  Patient had CTA as above  · Nephrology consult appreciated, I discussed with them today  · Lovenox renally dosed as above   · On Lasix GTT per Nephrology  BMP monitoring  Creatinine worsened again today - may need RRT/HD  Checking US  · Spangler cath was placed for more accurate I&Os  · Continue telemetry   · Avoid hypotension  Patient on midodrine per Neuro as above    Pulmonary emboli [facility])  Assessment & Plan  · Known history  Patient was on Xarelto   · Xarelto was held on admission and patient was initially placed on heparin GTT  Now on Lovenox 1mg/kg Q24hr as above    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 [facility])  Assessment & Plan  · Patient with known urothelial carcinoma, muscle invasive, 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  Follows at [facility]  · CTA noted:  Multiple somewhat ill-defined biapical pulmonary nodules measuring up to 1 4 cm in the left upper lobe  While these may be postinflammatory/postinfectious in nature, underlying malignancy to be excluded  Fall  Assessment & Plan  · Appreciate trauma input, they have since signed off  · Continue PT/OT   · Fall precautions     VTE Pharmacologic Prophylaxis:   Pharmacologic: Enoxaparin (Lovenox)  Mechanical VTE Prophylaxis in Place: Yes    Patient Centered Rounds: I have performed bedside rounds with nursing staff today  Hope    Discussions with Specialists or Other Care Team Provider: Dr Juan Pablo Darby Nephrology     Education and Discussions with Family / Patient: patient; I attempted to call the patient's wife on her mobile number as she had requested however there was no answer  Per my discussion with Nephrologist earlier today he spoke with the patient's wife     Time Spent for Care: 30 minutes    More than 50% of total time spent on counseling and coordination of care as described above     Current Length of Stay: 5 day(s)    Current Patient Status: Inpatient   Certification Statement: The patient will continue to require additional inpatient hospital stay due to worsening renal failure     Discharge Plan: pending  Worsening renal failure     Code Status: Level 1 - Full Code      Subjective:   Mr Steven Harrison with expressive aphasia  He appears to be speaking in short phrases more easily and clearly today than prior    Objective:     Vitals:   Temp (24hrs), Av 7 °F (36 5 °C), Min:97 4 °F (36 3 °C), Max:97 9 °F (36 6 °C)    Temp:  [97 4 °F (36 3 °C)-97 9 °F (36 6 °C)] 97 7 °F (36 5 °C)  HR:  [84-89] 86  Resp:  [18-20] 18  BP: (142-169)/(77-90) 160/77  SpO2:  [94 %-97 %] 94 %  Body mass index is 21 16 kg/m²  Input and Output Summary (last 24 hours): Intake/Output Summary (Last 24 hours) at 2/15/2021 0943  Last data filed at 2/15/2021 0930  Gross per 24 hour   Intake 2288 33 ml   Output 305 ml   Net 1983 33 ml       Physical Exam:     Physical Exam  Vitals signs and nursing note reviewed  Constitutional:       Appearance: He is not toxic-appearing  Comments: Patient seen lying in bed comfortably resting  Pleasant and cooperative  No acute distress  Cardiovascular:      Rate and Rhythm: Normal rate and regular rhythm  Pulmonary:      Effort: Pulmonary effort is normal  No respiratory distress  Breath sounds: Normal breath sounds  No wheezing  Abdominal:      General: Bowel sounds are normal       Palpations: Abdomen is soft  Tenderness: There is no abdominal tenderness  Musculoskeletal:      Right lower leg: No edema  Left lower leg: No edema  Skin:     General: Skin is warm  Neurological:      Mental Status: He is alert  Comments: Patient follows all commands    Expressive aphasia   Psychiatric:         Mood and Affect: Mood normal          Behavior: Behavior normal            Additional Data:     Labs:    Results from last 7 days   Lab Units 02/15/21  0523  02/12/21  0510   WBC Thousand/uL 9 94   < > 9 15   HEMOGLOBIN g/dL 10 5*   < > 11 5*   HEMATOCRIT % 31 8*   < > 35 6*   PLATELETS Thousands/uL 212   < > 214   NEUTROS PCT %  --   --  82*   LYMPHS PCT %  --   --  9*   MONOS PCT %  --   --  7   EOS PCT %  --   --  1    < > = values in this interval not displayed  Results from last 7 days   Lab Units 02/15/21  0523  02/12/21  0510   SODIUM mmol/L 138   < > 140   POTASSIUM mmol/L 4 9   < > 4 1   CHLORIDE mmol/L 102   < > 108   CO2 mmol/L 24   < > 22   BUN mg/dL 84*   < > 54*   CREATININE mg/dL 6 70*   < > 3 08*   ANION GAP mmol/L 12   < > 10   CALCIUM mg/dL 10 2*   < > 10 8*   ALBUMIN g/dL  --   --  3 4*   TOTAL BILIRUBIN mg/dL  --   --  0 54   ALK PHOS U/L  --   --  84   ALT U/L  --   --  24   AST U/L  --   --  61*   GLUCOSE RANDOM mg/dL 95   < > 88    < > = values in this interval not displayed  Results from last 7 days   Lab Units 02/11/21  0026   INR  1 34*     Results from last 7 days   Lab Units 02/15/21  0807 02/14/21  2042 02/14/21  1641 02/14/21  1137 02/14/21  0754 02/13/21  2039 02/13/21  1635 02/13/21  1158 02/13/21  0828 02/12/21  1614 02/12/21  1117 02/12/21  0758   POC GLUCOSE mg/dl 102 108 111 121 87 100 101 110 103 101 160* 98     Results from last 7 days   Lab Units 02/11/21  0554   HEMOGLOBIN A1C % 5 8*               * I Have Reviewed All Lab Data Listed Above  * Additional Pertinent Lab Tests Reviewed:  All Labs Within Last 24 Hours Reviewed    Imaging:    Imaging Reports Reviewed Today Include: none  Imaging Personally Reviewed by Myself Includes:  none    Recent Cultures (last 7 days):           Last 24 Hours Medication List:   Current Facility-Administered Medications   Medication Dose Route Frequency Provider Last Rate    acetaminophen  650 mg Oral Q4H PRN Neeraj Mcgowan MD      aluminum-magnesium hydroxide-simethicone  30 mL Oral Q6H PRN Neeraj Mcgowan MD      atorvastatin  40 mg Oral QPM Neeraj Mcgowan MD  docusate sodium  100 mg Oral BID Neeraj Mcgowan MD      enoxaparin  1 mg/kg Subcutaneous Q24H [address] Cherry Stokes PA-C      furosemide  20 mg/hr Intravenous Continuous Chalo Liriano MD 20 mg/hr (02/15/21 2271)    insulin lispro  1-5 Units Subcutaneous TID AC Neeraj Mcgowan MD      magnesium oxide  400 mg Oral Daily Theresa Heimlich, CRNP      midodrine  2 5 mg Oral BID AC Chalo Liriano MD      ondansetron  4 mg Intravenous Q6H PRN Neeraj Mcgowan MD      senna  1 tablet Oral Daily Neeraj Mcgowan MD      sodium chloride  60 mL/hr Intravenous Continuous Colon MD Heri 60 mL/hr (02/15/21 7890)        Today, Patient Was Seen By: Cherry Stokes PA-C    ** Please Note: Dictation voice to text software may have been used in the creation of this document   **

## 2021-02-16 ENCOUNTER — APPOINTMENT (INPATIENT)
Dept: RADIOLOGY | Facility: HOSPITAL | Age: 86
DRG: 064 | End: 2021-02-16
Payer: COMMERCIAL

## 2021-02-16 PROBLEM — D64.9 ANEMIA: Status: ACTIVE | Noted: 2021-02-16

## 2021-02-16 LAB
ALBUMIN SERPL BCP-MCNC: 2.6 G/DL (ref 3.5–5)
ALP SERPL-CCNC: 76 U/L (ref 46–116)
ALT SERPL W P-5'-P-CCNC: 57 U/L (ref 12–78)
ANION GAP SERPL CALCULATED.3IONS-SCNC: 17 MMOL/L (ref 4–13)
AST SERPL W P-5'-P-CCNC: 100 U/L (ref 5–45)
BASOPHILS # BLD AUTO: 0.03 THOUSANDS/ΜL (ref 0–0.1)
BASOPHILS NFR BLD AUTO: 0 % (ref 0–1)
BILIRUB SERPL-MCNC: 1.12 MG/DL (ref 0.2–1)
BUN SERPL-MCNC: 96 MG/DL (ref 5–25)
CA-I BLD-SCNC: 1.19 MMOL/L (ref 1.12–1.32)
CALCIUM ALBUM COR SERPL-MCNC: 11.1 MG/DL (ref 8.3–10.1)
CALCIUM SERPL-MCNC: 10 MG/DL (ref 8.3–10.1)
CHLORIDE SERPL-SCNC: 101 MMOL/L (ref 100–108)
CK SERPL-CCNC: 69 U/L (ref 39–308)
CO2 SERPL-SCNC: 20 MMOL/L (ref 21–32)
CREAT SERPL-MCNC: 7.97 MG/DL (ref 0.6–1.3)
EOSINOPHIL # BLD AUTO: 0.08 THOUSAND/ΜL (ref 0–0.61)
EOSINOPHIL NFR BLD AUTO: 1 % (ref 0–6)
ERYTHROCYTE [DISTWIDTH] IN BLOOD BY AUTOMATED COUNT: 13.2 % (ref 11.6–15.1)
FERRITIN SERPL-MCNC: 1508 NG/ML (ref 8–388)
GFR SERPL CREATININE-BSD FRML MDRD: 6 ML/MIN/1.73SQ M
GLUCOSE SERPL-MCNC: 106 MG/DL (ref 65–140)
GLUCOSE SERPL-MCNC: 89 MG/DL (ref 65–140)
GLUCOSE SERPL-MCNC: 93 MG/DL (ref 65–140)
GLUCOSE SERPL-MCNC: 97 MG/DL (ref 65–140)
GLUCOSE SERPL-MCNC: 97 MG/DL (ref 65–140)
HCT VFR BLD AUTO: 32.1 % (ref 36.5–49.3)
HGB BLD-MCNC: 10.6 G/DL (ref 12–17)
IMM GRANULOCYTES # BLD AUTO: 0.09 THOUSAND/UL (ref 0–0.2)
IMM GRANULOCYTES NFR BLD AUTO: 1 % (ref 0–2)
IRON SATN MFR SERPL: 36 %
IRON SERPL-MCNC: 55 UG/DL (ref 65–175)
LYMPHOCYTES # BLD AUTO: 0.95 THOUSANDS/ΜL (ref 0.6–4.47)
LYMPHOCYTES NFR BLD AUTO: 10 % (ref 14–44)
MAGNESIUM SERPL-MCNC: 3 MG/DL (ref 1.6–2.6)
MCH RBC QN AUTO: 32.5 PG (ref 26.8–34.3)
MCHC RBC AUTO-ENTMCNC: 33 G/DL (ref 31.4–37.4)
MCV RBC AUTO: 99 FL (ref 82–98)
MONOCYTES # BLD AUTO: 0.74 THOUSAND/ΜL (ref 0.17–1.22)
MONOCYTES NFR BLD AUTO: 8 % (ref 4–12)
NEUTROPHILS # BLD AUTO: 7.43 THOUSANDS/ΜL (ref 1.85–7.62)
NEUTS SEG NFR BLD AUTO: 80 % (ref 43–75)
NRBC BLD AUTO-RTO: 0 /100 WBCS
PHOSPHATE SERPL-MCNC: 7 MG/DL (ref 2.3–4.1)
PLATELET # BLD AUTO: 213 THOUSANDS/UL (ref 149–390)
PMV BLD AUTO: 9.7 FL (ref 8.9–12.7)
POTASSIUM SERPL-SCNC: 5.1 MMOL/L (ref 3.5–5.3)
PROT SERPL-MCNC: 6 G/DL (ref 6.4–8.2)
PTH-INTACT SERPL-MCNC: 11.7 PG/ML (ref 18.4–80.1)
RBC # BLD AUTO: 3.26 MILLION/UL (ref 3.88–5.62)
SODIUM SERPL-SCNC: 138 MMOL/L (ref 136–145)
TIBC SERPL-MCNC: 151 UG/DL (ref 250–450)
WBC # BLD AUTO: 9.32 THOUSAND/UL (ref 4.31–10.16)

## 2021-02-16 PROCEDURE — 87086 URINE CULTURE/COLONY COUNT: CPT | Performed by: UROLOGY

## 2021-02-16 PROCEDURE — 83540 ASSAY OF IRON: CPT | Performed by: INTERNAL MEDICINE

## 2021-02-16 PROCEDURE — 82728 ASSAY OF FERRITIN: CPT | Performed by: INTERNAL MEDICINE

## 2021-02-16 PROCEDURE — 99232 SBSQ HOSP IP/OBS MODERATE 35: CPT | Performed by: UROLOGY

## 2021-02-16 PROCEDURE — 0T9130Z DRAINAGE OF LEFT KIDNEY WITH DRAINAGE DEVICE, PERCUTANEOUS APPROACH: ICD-10-PCS | Performed by: RADIOLOGY

## 2021-02-16 PROCEDURE — 86334 IMMUNOFIX E-PHORESIS SERUM: CPT | Performed by: INTERNAL MEDICINE

## 2021-02-16 PROCEDURE — 50432 PLMT NEPHROSTOMY CATHETER: CPT | Performed by: RADIOLOGY

## 2021-02-16 PROCEDURE — C1769 GUIDE WIRE: HCPCS

## 2021-02-16 PROCEDURE — 84165 PROTEIN E-PHORESIS SERUM: CPT | Performed by: INTERNAL MEDICINE

## 2021-02-16 PROCEDURE — C1894 INTRO/SHEATH, NON-LASER: HCPCS

## 2021-02-16 PROCEDURE — C1729 CATH, DRAINAGE: HCPCS

## 2021-02-16 PROCEDURE — 92526 ORAL FUNCTION THERAPY: CPT

## 2021-02-16 PROCEDURE — 99152 MOD SED SAME PHYS/QHP 5/>YRS: CPT | Performed by: RADIOLOGY

## 2021-02-16 PROCEDURE — 99232 SBSQ HOSP IP/OBS MODERATE 35: CPT | Performed by: NURSE PRACTITIONER

## 2021-02-16 PROCEDURE — 85025 COMPLETE CBC W/AUTO DIFF WBC: CPT | Performed by: INTERNAL MEDICINE

## 2021-02-16 PROCEDURE — 83550 IRON BINDING TEST: CPT | Performed by: INTERNAL MEDICINE

## 2021-02-16 PROCEDURE — 84100 ASSAY OF PHOSPHORUS: CPT | Performed by: INTERNAL MEDICINE

## 2021-02-16 PROCEDURE — 82550 ASSAY OF CK (CPK): CPT | Performed by: INTERNAL MEDICINE

## 2021-02-16 PROCEDURE — 99152 MOD SED SAME PHYS/QHP 5/>YRS: CPT

## 2021-02-16 PROCEDURE — 99233 SBSQ HOSP IP/OBS HIGH 50: CPT | Performed by: INTERNAL MEDICINE

## 2021-02-16 PROCEDURE — 99153 MOD SED SAME PHYS/QHP EA: CPT

## 2021-02-16 PROCEDURE — 84165 PROTEIN E-PHORESIS SERUM: CPT | Performed by: PATHOLOGY

## 2021-02-16 PROCEDURE — 99232 SBSQ HOSP IP/OBS MODERATE 35: CPT | Performed by: INTERNAL MEDICINE

## 2021-02-16 PROCEDURE — 99232 SBSQ HOSP IP/OBS MODERATE 35: CPT | Performed by: STUDENT IN AN ORGANIZED HEALTH CARE EDUCATION/TRAINING PROGRAM

## 2021-02-16 PROCEDURE — 82948 REAGENT STRIP/BLOOD GLUCOSE: CPT

## 2021-02-16 PROCEDURE — 80053 COMPREHEN METABOLIC PANEL: CPT | Performed by: INTERNAL MEDICINE

## 2021-02-16 PROCEDURE — 83883 ASSAY NEPHELOMETRY NOT SPEC: CPT | Performed by: INTERNAL MEDICINE

## 2021-02-16 PROCEDURE — 86335 IMMUNFIX E-PHORSIS/URINE/CSF: CPT | Performed by: PATHOLOGY

## 2021-02-16 PROCEDURE — 50432 PLMT NEPHROSTOMY CATHETER: CPT

## 2021-02-16 PROCEDURE — 97535 SELF CARE MNGMENT TRAINING: CPT

## 2021-02-16 PROCEDURE — 86334 IMMUNOFIX E-PHORESIS SERUM: CPT | Performed by: PATHOLOGY

## 2021-02-16 PROCEDURE — 83970 ASSAY OF PARATHORMONE: CPT | Performed by: INTERNAL MEDICINE

## 2021-02-16 PROCEDURE — 82330 ASSAY OF CALCIUM: CPT | Performed by: INTERNAL MEDICINE

## 2021-02-16 PROCEDURE — 92523 SPEECH SOUND LANG COMPREHEN: CPT

## 2021-02-16 PROCEDURE — 84166 PROTEIN E-PHORESIS/URINE/CSF: CPT | Performed by: PATHOLOGY

## 2021-02-16 PROCEDURE — 83735 ASSAY OF MAGNESIUM: CPT | Performed by: INTERNAL MEDICINE

## 2021-02-16 RX ORDER — MIDAZOLAM HYDROCHLORIDE 2 MG/2ML
INJECTION, SOLUTION INTRAMUSCULAR; INTRAVENOUS CODE/TRAUMA/SEDATION MEDICATION
Status: COMPLETED | OUTPATIENT
Start: 2021-02-16 | End: 2021-02-16

## 2021-02-16 RX ORDER — FENTANYL CITRATE 50 UG/ML
INJECTION, SOLUTION INTRAMUSCULAR; INTRAVENOUS CODE/TRAUMA/SEDATION MEDICATION
Status: COMPLETED | OUTPATIENT
Start: 2021-02-16 | End: 2021-02-16

## 2021-02-16 RX ORDER — LIDOCAINE WITH 8.4% SOD BICARB 0.9%(10ML)
SYRINGE (ML) INJECTION CODE/TRAUMA/SEDATION MEDICATION
Status: COMPLETED | OUTPATIENT
Start: 2021-02-16 | End: 2021-02-16

## 2021-02-16 RX ORDER — SEVELAMER HYDROCHLORIDE 800 MG/1
800 TABLET, FILM COATED ORAL
Status: DISCONTINUED | OUTPATIENT
Start: 2021-02-16 | End: 2021-02-17

## 2021-02-16 RX ORDER — CEFAZOLIN SODIUM 1 G/3ML
INJECTION, POWDER, FOR SOLUTION INTRAMUSCULAR; INTRAVENOUS CODE/TRAUMA/SEDATION MEDICATION
Status: COMPLETED | OUTPATIENT
Start: 2021-02-16 | End: 2021-02-16

## 2021-02-16 RX ORDER — SODIUM CHLORIDE 9 MG/ML
10 INJECTION INTRAVENOUS DAILY
Qty: 30 SYRINGE | Refills: 2 | Status: SHIPPED | OUTPATIENT
Start: 2021-02-16

## 2021-02-16 RX ADMIN — Medication 20 MG/HR: at 05:55

## 2021-02-16 RX ADMIN — Medication 10 ML: at 17:19

## 2021-02-16 RX ADMIN — SODIUM CHLORIDE 60 ML/HR: 0.9 INJECTION, SOLUTION INTRAVENOUS at 02:17

## 2021-02-16 RX ADMIN — MIDAZOLAM 0.5 MG: 1 INJECTION INTRAMUSCULAR; INTRAVENOUS at 17:08

## 2021-02-16 RX ADMIN — FENTANYL CITRATE 25 MCG: 50 INJECTION INTRAMUSCULAR; INTRAVENOUS at 17:22

## 2021-02-16 RX ADMIN — FENTANYL CITRATE 25 MCG: 50 INJECTION INTRAMUSCULAR; INTRAVENOUS at 17:15

## 2021-02-16 RX ADMIN — MIDAZOLAM 0.5 MG: 1 INJECTION INTRAMUSCULAR; INTRAVENOUS at 17:15

## 2021-02-16 RX ADMIN — CEFAZOLIN 1000 MG: 1 INJECTION, POWDER, FOR SOLUTION INTRAMUSCULAR; INTRAVENOUS at 16:55

## 2021-02-16 RX ADMIN — IOHEXOL 25 ML: 350 INJECTION, SOLUTION INTRAVENOUS at 17:53

## 2021-02-16 RX ADMIN — SODIUM BICARBONATE 60 ML/HR: 84 INJECTION, SOLUTION INTRAVENOUS at 11:24

## 2021-02-16 RX ADMIN — FENTANYL CITRATE 25 MCG: 50 INJECTION INTRAMUSCULAR; INTRAVENOUS at 17:08

## 2021-02-16 NOTE — PROGRESS NOTES
Progress Note - Nephrology   Wallace Moy 81 y o  male MRN: [id number]  Unit/Bed#: [facility] 904-01 Encounter: [id number]    ASSESSMENT AND PLAN:  Wallace Moy is an 80yo M with PMH significant for metastatic urothelial carcinoma for which he is followed at 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 during admission  We were consulted for VELVET  1  VELVET  Initially felt to be secondary to MATTHEW following CTA and was initiated on lasix  Baseline cr 1 3-1 6  US and CT 2/15/21 showed L>R hydronephrosis  UA 2/15/21 with moderate blood but no RBCs  Unclear etiology, but possibilities include left-sided hydronephrosis, immune checkpoint-inhibitor induced VELVET (although this is essentially a diagnosis of exclusion), myeloma due to constellation of hypercalcemia, anemia, and renal disease (although these could all be attributed to other things as well)  Rhabdo ruled out with normal CK  Left PCN placed yesterday 2/16/21, creatinine continues to rise to 9 today  - Avoid nephrotoxins and contrast if possible  - With worsening renal function in setting of metastatic cancer, patient will likely not tolerate RRT well  Dr Xander Guadalupe will discuss with his oncologist at [facility] regarding hospice/comfort care measures  - Urine eosinophils pending for evaluation of checkpoint-inhibitor induced VELVET     2  Blood pressure  Currently on midodrine   - Avoid hypotension  3  Electrolytes  New AG metabolic acidosis  Likely related to renal failure  - Bicarb drip initiated yesterday, can continue since persistently acidotic  4  Metabolic bone disease  Corrected Ca 11 1 yestrerday and 10 3 today with suppressed PTH of 11 7  Likely sequelae of lytic mets seen on CT  Phos elevated at 7, which also could be related to bone turnover, but my be contributed to by renal dysfunction    - Start sevelamer    5  Anemia  Hgb stable at 11   Ferritin 1508 so likely anemia of chronic disease in setting of cancer and acute renal failure  - SPEP and UPEP pending  - Stool occult pending      Subjective:   Patient denied any complaints and said he was comfortable  Difficult time expressing longer phrases but appeared to have good understanding and was able to answer yes/no questions  Denied any prior adverse effects from his tecentriq  Review of Systems   Constitutional: Negative for chills, fatigue and fever  Eyes: Negative for visual disturbance  Respiratory: Negative for cough and shortness of breath  Cardiovascular: Negative for chest pain  Gastrointestinal: Negative for abdominal pain, blood in stool, constipation, diarrhea and vomiting  Genitourinary: Negative for difficulty urinating, flank pain and hematuria  Musculoskeletal: Negative for arthralgias and myalgias  Skin: Negative for rash  Neurological: Positive for speech difficulty  Negative for dizziness and light-headedness  Psychiatric/Behavioral: Negative  Objective:     Vitals: Blood pressure 151/90, pulse 102, temperature 98 1 °F (36 7 °C), resp  rate 18, height 5' 10" (1 778 m), weight 76 4 kg (168 lb 6 9 oz), SpO2 95 %  ,Body mass index is 24 17 kg/m²  Weight (last 2 days)     Date/Time   Weight    02/17/21 0600   76 4 (168 43)    02/16/21 0600   76 5 (168 65)    02/15/21 0600   66 9 (147 49)                Intake/Output Summary (Last 24 hours) at 2/17/2021 0821  Last data filed at 2/17/2021 0501  Gross per 24 hour   Intake 120 ml   Output 2190 ml   Net -2070 ml       Urethral Catheter Double-lumen 16 Fr   (Active)   Reasons to continue Urinary Catheter  Acute urinary retention/obstruction failing urinary retention protocol 02/15/21 0900   Site Assessment Clean;Skin intact 02/15/21 2100   Collection Container Standard drainage bag 02/15/21 2100   Securement Method Securing device (Describe) 02/15/21 2100   Output (mL) 250 mL 02/15/21 2212       Physical Exam: General:  No acute distress  Skin:  No acute rash  Eyes:  No scleral icterus and noninjected  ENT:  Moist mucous membranes  Neck:  Supple, no jugular venous distention, trachea midline, overall appearance is normal  Chest:  Clear to auscultation  CVS:  Regular rate and rhythm, without a rub or gallops  Abdomen:  Normal bowel sounds, soft and nontender and nondistended, PCN in place draining well  Extremities:  No edema, and no cyanosis, no significant arthritic changes  Neuro: Expressive aphasia, but able to answer yes/no to questions and follows directions well; strength 5/5 in b/l extremities; visual fields and EOM intact  Psych:  Alert and oriented and appropriate  Medications:    Scheduled Meds:  Current Facility-Administered Medications   Medication Dose Route Frequency Provider Last Rate    acetaminophen  650 mg Oral Q4H PRN Lucas Bishop MD      aluminum-magnesium hydroxide-simethicone  30 mL Oral Q6H PRN Lucas Bishop MD      atorvastatin  40 mg Oral QPM Lucas Bishop MD      docusate sodium  100 mg Oral BID Lucas Bishop MD      insulin lispro  1-5 Units Subcutaneous TID AC Lucas Bishop MD      ondansetron  4 mg Intravenous Q6H PRN Lucas Bishop MD      senna  1 tablet Oral Daily Lucas Bishop MD      sevelamer  800 mg Oral TID With Meals Dia Nam MD      sodium bicarbonate infusion  60 mL/hr Intravenous Continuous Dia Nam MD 60 mL/hr (02/17/21 0452)       PRN Meds:   acetaminophen    aluminum-magnesium hydroxide-simethicone    ondansetron    Continuous Infusions:sodium bicarbonate infusion, 60 mL/hr, Last Rate: 60 mL/hr (02/17/21 0452)        Lab, Imaging and other studies: I have personally reviewed pertinent labs    Laboratory Results:  Results from last 7 days   Lab Units 02/17/21  0513 02/16/21  0612 02/15/21  0523 02/14/21  0453 02/13/21  0952 02/12/21  0510 02/11/21  0554  02/10/21  1403   WBC Thousand/uL 10 72* 9 32 9 94 7 62 10 01 9 15 8 63 < >  --    HEMOGLOBIN g/dL 11 0* 10 6* 10 5* 9 2* 11 4* 11 5* 8 9*   < >  --    I STAT HEMOGLOBIN g/dl  --   --   --   --   --   --   --   --  11 2*   HEMATOCRIT % 33 3* 32 1* 31 8* 28 3* 34 7* 35 6* 28 3*   < >  --    HEMATOCRIT, ISTAT %  --   --   --   --   --   --   --   --  33*   PLATELETS Thousands/uL 211 213 212 176 200 214 152   < >  --    POTASSIUM mmol/L 5 1 5 1 4 9 4 6 4 2 4 1 3 5   < >  --    CHLORIDE mmol/L 100 101 102 102 104 108 112*   < >  --    CO2 mmol/L 21 20* 24 21 23 22 19*   < >  --    CO2, I-STAT mmol/L  --   --   --   --   --   --   --   --  28   BUN mg/dL 102* 96* 84* 64* 62* 54* 42*   < >  --    CREATININE mg/dL 9 00* 7 97* 6 70* 4 45* 4 21* 3 08* 1 91*   < >  --    CALCIUM mg/dL 10 3* 10 0 10 2* 8 6 10 3* 10 8* 9 2   < >  --    MAGNESIUM mg/dL  --  3 0*  --   --   --  2 1 1 7  --   --    PHOSPHORUS mg/dL  --  7 0*  --   --   --  4 0 3 1  --   --    GLUCOSE, ISTAT mg/dl  --   --   --   --   --   --   --   --  132    < > = values in this interval not displayed       Urinalysis:   Lab Results   Component Value Date    COLORU Yellow 02/15/2021    CLARITYU Clear 02/15/2021    SPECGRAV 1 008 02/15/2021    PHUR 7 0 02/15/2021    LEUKOCYTESUR Negative 02/15/2021    NITRITE Negative 02/15/2021    GLUCOSEU Negative 02/15/2021    KETONESU Negative 02/15/2021    BILIRUBINUR Negative 02/15/2021    BLOODU Moderate (A) 02/15/2021     ABGs: No results found for: Farhan 30  Radiology review:

## 2021-02-16 NOTE — ASSESSMENT & PLAN NOTE
· Creatinine 3 08 2/12/21, up from 1 91 the day prior, and baseline appears to be around 1 0  · Suspected secondary to ATN per Nephrology  Patient had CTA as above  · Ultrasound showed mild-to-moderate bilateral hydronephrosis, worse on the left  · CT abdomen pelvis without contrast showed moderate left hydroureteronephrosis, likely due to patient's bladder cancer since there is eccentric, partially calcified left-sided urinary bladder wall thickening  There is no right-sided hydronephrosis  1 7 cm diameter lytic metastases in T12 vertebral body, liver metastasis, right lower lobe pulmonary nodules, retroperitoneal adenopathy  · Discussed with nephrology, Urology and IR  · Plan for L PCN today  · No immediate indication for renal replacement therapy but he will needed it, if the kidney function does not improve with PCN  · Continue bicarb drip  · Patient is on full-dose Lovenox acute 24 hours    Will discuss with IR about restarting anticoagulation after PCN today

## 2021-02-16 NOTE — PLAN OF CARE
Problem: Nutrition  Goal: Nutrition/Hydration status is improving  Description: Monitor and assess patient's nutrition/hydration status for malnutrition (ex- brittle hair, bruises, dry skin, pale skin and conjunctiva, muscle wasting, smooth red tongue, and disorientation)  Collaborate with interdisciplinary team and initiate plan and interventions as ordered  Monitor patient's weight and dietary intake as ordered or per policy  Utilize nutrition screening tool and intervene per policy  Determine patient's food preferences and provide high-protein, high-caloric foods as appropriate  - Assist patient with eating   - Allow adequate time for meals   - Encourage patient to take dietary supplement as ordered  - Collaborate with clinical nutritionist   - Include patient/family/caregiver in decisions related to nutrition    Outcome: Not Progressing  PO intake very limited

## 2021-02-16 NOTE — ASSESSMENT & PLAN NOTE
· Known history  Patient was on Xarelto   · Xarelto was held on admission and patient was initially placed on heparin GTT  Now on Lovenox 1mg/kg Q24hr as above    Hold for PCN today

## 2021-02-16 NOTE — INTERVAL H&P NOTE
Update: (This section must be completed if the H&P was completed greater than 24 hrs to procedure or admission)    H&P reviewed  After examining the patient, I find no changed to the H&P since it had been written  86M with a stroke, renal failure, and history of bladder cancer    Now with worsening renal failure, new left hydronephrosis    Stenting from below will not be easy first-line therapy as there are calcifications related to prior tumor/treatment    Some degree of right renal fullness but this is minimal compared to the left    Left renal decompression via percutaneous nephrostomy indicated    Patient gives verbal consent but is aphasic    Consent from wife/son    We will sedate    Of note he has multiple medical record numbers and the left hydronephrosis was not present on a prior PET-CT    Patient re-evaluated   Accept as history and physical     MP2 ASA3    Yolanda Ferrari MD/February 16, 2021/5:12 PM

## 2021-02-16 NOTE — CONSULTS
Consultation - Interventional Radiology  Alejandra Enrique 81 y o  male MRN: [id number]  Unit/Bed#: [facility] 904-01 Encounter: [id number]        IP Consult to IR  Consult performed by: MILAGRO Deleon  Consult ordered by: Daniel Simmons MD          ASSESSMENT/PLAN:    81year old male presented with expressive aphasia after witnessed fall at home on Xarelto for history of DVT/PE, stat CTH and no acute findings were noted  Patient transitioned to therapeutic lovenox QD  Last dose of lovenox was 2/15 at 1630  Patient has urothelial carcinoma  with mets and is currently enrolled in a clinical [employer]  At this time his creatinine is 7 97 and tumor burden on his left kidney is causing moderate hydronephrosis  - plan for L PCN  - keep NPO  - anesthesia consult  - will send urine for culture   - appreciate urology input  - appreciate nephrology input    Problem List     * (Principal) CVA (cerebral vascular accident) ([address] )    Fall    Pulmonary emboli ([address] )    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) 10/2020    CVA (cerebral vascular accident) ([address] ) 02/10/2021    Pulmonary emboli ([address] ) 10/2020       Past Surgical History:  No past surgical history on file      Social History:  Social History     Substance and Sexual Activity   Alcohol Use Not on file     Social History     Substance and Sexual Activity   Drug Use Not on file     Social History     Tobacco Use   Smoking Status Not on file Family History:  No family history on file  Allergies:  No Known Allergies    Medications:  Medications Prior to Admission   Medication    Xarelto 20 MG tablet     Current Facility-Administered Medications   Medication Dose Route Frequency    acetaminophen (TYLENOL) tablet 650 mg  650 mg Oral Q4H PRN    aluminum-magnesium hydroxide-simethicone (MYLANTA) oral suspension 30 mL  30 mL Oral Q6H PRN    atorvastatin (LIPITOR) tablet 40 mg  40 mg Oral QPM    docusate sodium (COLACE) capsule 100 mg  100 mg Oral BID    insulin lispro (HumaLOG) 100 units/mL subcutaneous injection 1-5 Units  1-5 Units Subcutaneous TID AC    ondansetron (ZOFRAN) injection 4 mg  4 mg Intravenous Q6H PRN    senna (SENOKOT) tablet 8 6 mg  1 tablet Oral Daily    sevelamer (RENAGEL) tablet 800 mg  800 mg Oral TID With Meals    sodium bicarbonate 75 mEq in sodium chloride 0 45 % 1,000 mL infusion  60 mL/hr Intravenous Continuous       Vitals:  /85   Pulse 96   Temp 98 1 °F (36 7 °C)   Resp 18   Ht 5' 10" (1 778 m) Comment: per out pt records  Wt 76 5 kg (168 lb 10 4 oz)   SpO2 95%   BMI 24 20 kg/m²   Body mass index is 24 2 kg/m²  Weight (last 2 days)     Date/Time   Weight    02/16/21 0600   76 5 (168 65)    02/15/21 0600   66 9 (147 49)    02/14/21 0600   66 8 (147 27)              I/Os:    Intake/Output Summary (Last 24 hours) at 2/16/2021 1058  Last data filed at 2/16/2021 0200  Gross per 24 hour   Intake 1092 ml   Output 700 ml   Net 392 ml       PHYSICAL EXAM  Physical Exam  Constitutional:       Appearance: Normal appearance  Cardiovascular:      Rate and Rhythm: Normal rate and regular rhythm  Pulses: Normal pulses  Heart sounds: Normal heart sounds  Pulmonary:      Effort: Pulmonary effort is normal       Breath sounds: Normal breath sounds  Abdominal:      General: Abdomen is flat  Palpations: Abdomen is soft  Tenderness: There is no abdominal tenderness     Skin:     General: Skin is warm and dry  Capillary Refill: Capillary refill takes less than 2 seconds  Neurological:      Mental Status: He is alert  Psychiatric:         Attention and Perception: Attention and perception normal          Mood and Affect: Mood normal          Speech: Speech is delayed  Behavior: Behavior is cooperative  Comments: Aphasic           Lab Results and Cultures:   CBC with diff:   Lab Results   Component Value Date    WBC 9 32 02/16/2021    HGB 10 6 (L) 02/16/2021    HCT 32 1 (L) 02/16/2021    MCV 99 (H) 02/16/2021     02/16/2021    MCH 32 5 02/16/2021    MCHC 33 0 02/16/2021    RDW 13 2 02/16/2021    MPV 9 7 02/16/2021    NRBC 0 02/16/2021      BMP/CMP:  Lab Results   Component Value Date    K 5 1 02/16/2021     02/16/2021    CO2 20 (L) 02/16/2021    CO2 28 02/10/2021    BUN 96 (H) 02/16/2021    CREATININE 7 97 (H) 02/16/2021    GLUCOSE 132 02/10/2021    CALCIUM 10 0 02/16/2021     (H) 02/16/2021    ALT 57 02/16/2021    ALKPHOS 76 02/16/2021    EGFR 6 02/16/2021   ,     Coags:   Lab Results   Component Value Date    PTT 40 (H) 02/12/2021    INR 1 34 (H) 02/11/2021   ,   Results from last 7 days   Lab Units 02/12/21  1648 02/12/21  0943 02/12/21  0236 02/11/21  1958 02/11/21  1256  02/11/21  0026 02/10/21  1407   PTT seconds 40* 36 37 39* 43*   < > 114* 30   INR   --   --   --   --   --   --  1 34* 1 30*    < > = values in this interval not displayed          Lipid Panel: No results found for: CHOL  Lab Results   Component Value Date    HDL 36 (L) 02/11/2021     Lab Results   Component Value Date    HDL 36 (L) 02/11/2021     Lab Results   Component Value Date    LDLCALC 46 02/11/2021     Lab Results   Component Value Date    TRIG 65 02/11/2021       HgbA1c:   Lab Results   Component Value Date    HGBA1C 5 8 (H) 02/11/2021       Blood Culture: No results found for: BLOODCX,   Urinalysis:   Lab Results   Component Value Date    COLORU Yellow 02/15/2021    CLARITYU Clear 02/15/2021    SPECGRAV 1 008 02/15/2021    PHUR 7 0 02/15/2021    LEUKOCYTESUR Negative 02/15/2021    NITRITE Negative 02/15/2021    GLUCOSEU Negative 02/15/2021    KETONESU Negative 02/15/2021    BILIRUBINUR Negative 02/15/2021    BLOODU Moderate (A) 02/15/2021   ,   Urine Culture: No results found for: URINECX,   Wound Culure:  No results found for: WOUNDCULT    Imaging Studies: I have personally reviewed pertinent films in PACS    Counseling / Coordination of Care  Total time spent today  30 minutes  Greater than 50% of total time was spent with the patient and / or family counseling and / or coordination of care  Thank you for allowing me to participate in the care of Candice Frederick  Please don't hesitate to call, text, email, or TigerText with any questions  This text is generated with voice recognition software  There may be translation, syntax,  or grammatical errors  If you have any questions, please contact the dictating provider

## 2021-02-16 NOTE — OCCUPATIONAL THERAPY NOTE
OccupationalTherapy Progress Note     Patient Name: Merlyn BERGERONU Date: 2/16/2021  Problem List  Principal Problem:    CVA (cerebral vascular accident) [facility])  Active Problems:    Fall    Pulmonary emboli (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 )    Anemia          02/16/21 1348   OT Last Visit   OT Visit Date 02/16/21   Note Type   Note Type Treatment   Restrictions/Precautions   Weight Bearing Precautions Per Order No   Other Precautions Cognitive; Chair Alarm; Bed Alarm; Fall Risk;Multiple lines   General   Response to Previous Treatment Patient with no complaints from previous session   Lifestyle   Reciprocal Relationships Son, wife    Pain Assessment   Pain Assessment Tool 0-10   Pain Score No Pain   ADL   Grooming Assistance 4  Minimal Assistance   Grooming Deficit Wash/dry hands   Grooming Comments Pt continues to have difficulty sequencing hand washing routine steps, requires tactile and verbal prompting    Toileting Assistance  4  Minimal Assistance   Toileting Deficit Grab bar use;Verbal cueing   Toileting Comments requires A for toilet paper mgmts   Bed Mobility   Supine to Sit 3  Moderate assistance   Additional items Assist x 1;HOB elevated; Bedrails; Increased time required;LE management   Transfers   Sit to Stand 3  Moderate assistance   Additional items Assist x 1; Increased time required   Stand to Sit 3  Moderate assistance   Additional items Assist x 1; Increased time required   Toilet transfer 3  Moderate assistance   Additional items Assist x 1; Increased time required  (GBs)   Functional Mobility   Functional Mobility 2  Maximal assistance   Additional Comments Ax1 + 1 to manage IV pole - noted pt with tendency to bend knees while walking, VCs to fix posutres   Cognition   Overall Cognitive Status Impaired   Arousal/Participation Alert; Responsive; Cooperative   Attention Attends with cues to redirect Orientation Level Oriented to person   Memory Decreased recall of precautions   Following Commands Follows one step commands with increased time or repetition   Comments Pt pleasant and cooperative t/o session, though continues to demonstrate difficulty sequencing  Requires tactile prompting and VCs to complete tasks, as well as for RW mgmt    Activity Tolerance   Activity Tolerance Patient limited by fatigue   Assessment   Assessment Patient participated in Skilled OT session this date with interventions consisting of ADL re training with the use of correct body mechnaics,  therapeutic activities to: increase activity tolerance and increase OOB/ sitting tolerance   Upon arrival patient was found supine in bed  Pt demonstrated the following tasks: MOD A sup to sit, MOD A sit to stand, MOD A toilet transfer with RW  Pt ambulated to BR with RW and MOD A x 1 + 1 for IV mgmt  Pt with decreased balance and tendency to bend knees when ambulating - VCs to fix positioning  Pt required MIN A for toileting and hand washing routine  Pt very fatigued post bathroom activity  Patient requiring verbal cues for correct technique, cognitive assistance to anticipate next step and frequent rest periods  Patient continues to be functioning below baseline level, occupational performance remains limited secondary to factors listed above and increased risk for falls and injury  From OT standpoint, recommendation at time of d/c would be Short Term Rehab - pt son and wife in room during session  Reported that pt was completing tasks without assistnace at baseline  Patient to benefit from continued Occupational Therapy treatment while in the hospital to address deficits as defined above and maximize level of functional independence with ADLs and functional mobility  Pt was left in chair with alarm on and family present after session with all current needs met   The patient's raw score on the AM-PAC Daily Activity inpatient short form is 16, standardized score is 35 96, less than 39 4  Patients at this level are likely to benefit from DC to post-acute rehabilitation services  Please refer to the recommendation of the Occupational Therapist for safe DC planning  Plan   Treatment Interventions ADL retraining;Functional transfer training;UE strengthening/ROM; Endurance training;Cognitive reorientation;Patient/family training;Equipment evaluation/education; Neuromuscular reeducation; Fine motor coordination activities; Compensatory technique education;Continued evaluation; Energy conservation; Activityengagement   Goal Expiration Date 02/24/21   OT Treatment Day 2   OT Frequency 3-5x/wk   Recommendation   OT Discharge Recommendation Post-Acute Rehabilitation Services   OT - OK to Discharge Yes  (to rehab when medically stable )   AM-PAC Daily Activity Inpatient   Lower Body Dressing 2   Bathing 2   Toileting 3   Upper Body Dressing 3   Grooming 3   Eating 3   Daily Activity Raw Score 16   Daily Activity Standardized Score (Calc for Raw Score >=11) 35 96   AM-PAC Applied Cognition Inpatient   Following a Speech/Presentation 2   Understanding Ordinary Conversation 3   Taking Medications 1   Remembering Where Things Are Placed or Put Away 1   Remembering List of 4-5 Errands 1   Taking Care of Complicated Tasks 1   Applied Cognition Raw Score 9   Applied Cognition Standardized Score 22 48     Murtaza Callejas MS, OTR/L

## 2021-02-16 NOTE — ASSESSMENT & PLAN NOTE
· Per record review, on 2/10/21, patient was noted to be expressively aphasic, last known well at noon that day, with aphasia noted right after a fall  · Did not receive tPA due to Xarelto use  · Initial CT head with microangiopathic changes, no acute intracranial abnormality  CTA head/neck with left distal M2 and M3 occlusion however no occlusion was noted by Neurosurgeon per Neurology records  Repeat CT head from 2/10/21 due to worsening aphasia with no evidence of acute vascular territorial infarction, intracranial hemorrhage or mass effect  · MRI brain revealed cluster of acute to early subacute embolic infarcts in the left MCA territory, within the inferior parietal temporal region  Few scattered embolic infarcts in the frontoparietal region and right cerebellar hemisphere   Possible central/cardiac source  · Per Neurology on 2/12/21 the patient's heparin GTT was discontinued and he switched to Lovenox (1mg/kg Q24hr, dosing per Neuro/Nephro/Pharmacy)

## 2021-02-16 NOTE — SPEECH THERAPY NOTE
Speech Language/Pathology    Speech/Language Pathology Progress Note    Patient Name: Felipe Campos  XEZGB'S Date: 2/16/2021       Subjective:  Pt awake, alert, stated "Oh hello" upon my arrival  "No" in response to being hungry  Current Diet: level 3 w/ nt   Objective:  Pt seen for ongoing dx dysphagia tx, trials w thin by cup  Pt took ~3 oz thin water by cup & did attempt 2 bites of fr toast   Adequate control of the cup sips- no anterior loss  Noted mult swallows w/ each trial   Mild throat clear on first sip then noted sneezing x4  He chewed up the toast but spit out both pieces  He stated he did not like it  By the time he agreed to any other material his nurse stated he was just made npo for a procedure  Assessment:  Pt took only small amt of thin prior to order for npo for test   Would like to offer larger sample prior to upgrading       Plan/Recommendations:  Continue with level 3 w/ nt  Will follow

## 2021-02-16 NOTE — PROGRESS NOTES
UROLOGY PROGRESS NOTE   Patient Identifiers: Librado Hua (MRN [id number])  Date of Service: 2/16/2021        Assessment:   43-year-old gentleman with history of metastatic bladder cancer, with worsening kidney injury, CT scan was performed showing tumor burden blocking the left kidney, no right hydronephrosis is noted, left hydronephrosis is seen  Patient denies having any flank pain at this time, however his ability to relate his history is severely limited by recent stroke with expressive aphasia  I did speak with the patient's wife by telephone and explained to her his current presentation and the recommendation for nephrostomy tube placement for decompression of the left renal unit, she is amenable to this intervention  I have placed orders for left nephrostomy tube placement, I have also called the Interventional Radiology office to make him aware of this case  The patient had been on therapeutic Lovenox, this was discontinued by me just now, after discussion with the [address] [place] [address] Internal Medicine team     Plan:   Patient with acute kidney injury, worsening creatinine, not responding to bladder decompression  Bladder mass blocking the left ureteral orifice, recommend left nephrostomy tube placement  Urology following  Please keep patient NPO for nephrostomy tube placement, please hold Lovenox at this time  Portions of the above record have been created with voice recognition software  Occasional wrong word or "sound alike" substitution may have occurred due to the inherent limitations of voice recognition software  Read the chart carefully and recognize, using context, where substitution may have occurred  Subjective:     24 HR EVENTS:   no significant events        Patient has  No complaints, appears nontoxic, however he is unable to express himself properly due to expressive aphasia, he is able to answer yes or no questions, however by shaking his head or nodding his head Objective:     VITALS:    Vitals:    02/16/21 0614   BP: 161/86   Pulse: 96   Resp: 18   Temp: 98 °F (36 7 °C)   SpO2:        INS & OUTS:  [unfilled]  Reviewed pertinent values I's/O's      LABS:  Lab Results   Component Value Date    HGB 10 6 (L) 02/16/2021    HCT 32 1 (L) 02/16/2021    WBC 9 32 02/16/2021     02/16/2021   ]    Lab Results   Component Value Date    K 5 1 02/16/2021     02/16/2021    CO2 20 (L) 02/16/2021    BUN 96 (H) 02/16/2021    CREATININE 7 97 (H) 02/16/2021    CALCIUM 10 0 02/16/2021    GLUCOSE 132 02/10/2021   ]    INPATIENT MEDS:    Current Facility-Administered Medications:     acetaminophen (TYLENOL) tablet 650 mg, 650 mg, Oral, Q4H PRN, Erika Kaplan MD    aluminum-magnesium hydroxide-simethicone (MYLANTA) oral suspension 30 mL, 30 mL, Oral, Q6H PRN, Erika Kaplan MD    atorvastatin (LIPITOR) tablet 40 mg, 40 mg, Oral, QPM, Erika Kaplan MD, 40 mg at 02/15/21 1712    docusate sodium (COLACE) capsule 100 mg, 100 mg, Oral, BID, Erika Kaplan MD, 100 mg at 02/15/21 1712    insulin lispro (HumaLOG) 100 units/mL subcutaneous injection 1-5 Units, 1-5 Units, Subcutaneous, TID AC **AND** Fingerstick Glucose (POCT), , , TID AC, Erika Kaplan MD    ondansetron (ZOFRAN) injection 4 mg, 4 mg, Intravenous, Q6H PRN, Erika Kaplan MD    senna (SENOKOT) tablet 8 6 mg, 1 tablet, Oral, Daily, Erika Kaplan MD, 8 6 mg at 02/15/21 3288    sevelamer (RENAGEL) tablet 800 mg, 800 mg, Oral, TID With Meals, Piedad Mantilla MD    sodium bicarbonate 75 mEq in sodium chloride 0 45 % 1,000 mL infusion, 60 mL/hr, Intravenous, Continuous, Piedad Mantilla MD      Physical Exam:   /86   Pulse 96   Temp 98 °F (36 7 °C)   Resp 18   Ht 5' 10" (1 778 m) Comment: per out pt records  Wt 76 5 kg (168 lb 10 4 oz)   SpO2 95%   BMI 24 20 kg/m²   GEN: no acute distress    RESP: breathing comfortably with no accessory muscle use    CARDIAC: peripheral pulses present    ABD: soft, non-tender, non-distended   INCISION: None   EXT: no significant peripheral edema   DRAINS: none  NEURO: Patient with expressive aphasia, he is leaning toward his right-hand side, however this appears to be just for comfort, he is able to shake his head yes and no when asked certain questions, he moves all extremities equally   PSYCHIATRIC: Unable to perform    GENITOURINARY:no bladder tenderness    Cox catheter with dark yellow urine      COX: darker yellow urine        RADIOLOGY:   CT scan reviewed, Left hydronephrosis noted, bladder mass noted near ureteral orifice, metastatic disease also noted

## 2021-02-16 NOTE — PROGRESS NOTES
Progress Note - Paul Rubi 1935, 81 y o  male MRN: [id number]    Unit/Bed#: [facility] [place] 904-01 Encounter: [id number]    Primary Care Provider: Oniel Espinal MD   Date and time admitted to hospital: 2/10/2021  1:55 PM        * CVA (cerebral vascular accident) [facility])  Assessment & Plan  · Per record review, on 2/10/21, patient was noted to be expressively aphasic, last known well at noon that day, with aphasia noted right after a fall  · Did not receive tPA due to Xarelto use  · Initial CT head with microangiopathic changes, no acute intracranial abnormality  CTA head/neck with left distal M2 and M3 occlusion however no occlusion was noted by Neurosurgeon per Neurology records  Repeat CT head from 2/10/21 due to worsening aphasia with no evidence of acute vascular territorial infarction, intracranial hemorrhage or mass effect  · MRI brain revealed cluster of acute to early subacute embolic infarcts in the left MCA territory, within the inferior parietal temporal region  Few scattered embolic infarcts in the frontoparietal region and right cerebellar hemisphere  Possible central/cardiac source  · Per Neurology on 2/12/21 the patient's heparin GTT was discontinued and he switched to Lovenox (1mg/kg Q24hr, dosing per Neuro/Nephro/Pharmacy)      VELVET (acute kidney injury) ([facility] [address] )  Assessment & Plan  · Creatinine 3 08 2/12/21, up from 1 91 the day prior, and baseline appears to be around 1 0  · Suspected secondary to ATN per Nephrology  Patient had CTA as above  · Ultrasound showed mild-to-moderate bilateral hydronephrosis, worse on the left  · CT abdomen pelvis without contrast showed moderate left hydroureteronephrosis, likely due to patient's bladder cancer since there is eccentric, partially calcified left-sided urinary bladder wall thickening  There is no right-sided hydronephrosis    1 7 cm diameter lytic metastases in T12 vertebral body, liver metastasis, right lower lobe pulmonary nodules, retroperitoneal adenopathy  · Discussed with nephrology, Urology and IR  · Plan for L PCN today  · No immediate indication for renal replacement therapy but he will needed it, if the kidney function does not improve with PCN  · Continue bicarb drip  · Patient is on full-dose Lovenox acute 24 hours  Will discuss with IR about restarting anticoagulation after PCN today    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)  Assessment & Plan  · Known history  Patient was on Xarelto   · Xarelto was held on admission and patient was initially placed on heparin GTT  Now on Lovenox 1mg/kg Q24hr as above  Hold for PCN today    Anemia  Assessment & Plan  Hemoglobin stable at 10 6, workup ordered per Nephrology to rule out MM  Transfuse if hemoglobin less than 7    Fall  Assessment & Plan  · Appreciate trauma input, they have since signed off  · Continue PT/OT   · Fall precautions         VTE Pharmacologic Prophylaxis:   Pharmacologic: Enoxaparin (Lovenox)  Mechanical VTE Prophylaxis in Place: Yes    Patient Centered Rounds: Discussed with nursing    Discussions with Specialists or Other Care Team Provider:  Nephrology, Urology, IR    Education and Discussions with Family / Patient: patient    Time Spent for Care: 30 minutes  More than 50% of total time spent on counseling and coordination of care as described above  Current Length of Stay: 6 day(s)    Current Patient Status: Inpatient   Certification Statement: The patient will continue to require additional inpatient hospital stay due to Worsening kidney function    Discharge Plan: To be determined    Code Status: Level 1 - Full Code      Subjective:   Patient was seen and evaluated bedside, no overnight events per nursing staff  Patient with expressive aphasia      Objective:     Vitals:   Temp (24hrs), Av 1 °F (36 7 °C), Min:97 6 °F (36 4 °C), Max:98 5 °F (36 9 °C)    Temp:  [97 6 °F (36 4 °C)-98 5 °F (36 9 °C)] 98 °F (36 7 °C)  HR:  [] 96  Resp:  [18-20] 18  BP: (145-167)/(81-97) 161/86  SpO2:  [93 %-95 %] 95 %  Body mass index is 24 2 kg/m²  Input and Output Summary (last 24 hours): Intake/Output Summary (Last 24 hours) at 2021 1004  Last data filed at 2021 0200  Gross per 24 hour   Intake 1092 ml   Output 700 ml   Net 392 ml       Physical Exam:     Physical Exam  Constitutional:       General: He is not in acute distress  HENT:      Head: Atraumatic  Neck:      Musculoskeletal: Neck supple  Cardiovascular:      Rate and Rhythm: Normal rate and regular rhythm  Heart sounds: No murmur  No friction rub  No gallop  Pulmonary:      Effort: Pulmonary effort is normal  No respiratory distress  Breath sounds: Normal breath sounds  No wheezing  Abdominal:      General: Bowel sounds are normal  There is no distension  Palpations: Abdomen is soft  Tenderness: There is no abdominal tenderness  Musculoskeletal:         General: No swelling  Skin:     General: Skin is warm and dry  Neurological:      Mental Status: He is alert        Comments: Expressive aphasia   Psychiatric:      Comments: Unable to assess         Additional Data:     Labs:    Results from last 7 days   Lab Units 21  0612   WBC Thousand/uL 9 32   HEMOGLOBIN g/dL 10 6*   HEMATOCRIT % 32 1*   PLATELETS Thousands/uL 213   NEUTROS PCT % 80*   LYMPHS PCT % 10*   MONOS PCT % 8   EOS PCT % 1     Results from last 7 days   Lab Units 21  0612   SODIUM mmol/L 138   POTASSIUM mmol/L 5 1   CHLORIDE mmol/L 101   CO2 mmol/L 20*   BUN mg/dL 96*   CREATININE mg/dL 7 97*   ANION GAP mmol/L 17*   CALCIUM mg/dL 10 0   ALBUMIN g/dL 2 6*   TOTAL BILIRUBIN mg/dL 1 12*   ALK PHOS U/L 76   ALT U/L 57   AST U/L 100*   GLUCOSE RANDOM mg/dL 89     Results from last 7 days   Lab Units 02/11/21  0026   INR  1 34*     Results from last 7 days   Lab Units 02/16/21  0742 02/15/21  2101 02/15/21  1655 02/15/21  1155 02/15/21  0807 02/14/21  2042 02/14/21  1641 02/14/21  1137 02/14/21  0754 02/13/21  2039 02/13/21  1635 02/13/21  1158   POC GLUCOSE mg/dl 93 90 104 106 102 108 111 121 87 100 101 110     Results from last 7 days   Lab Units 02/11/21  0554   HEMOGLOBIN A1C % 5 8*               * I Have Reviewed All Lab Data Listed Above  * Additional Pertinent Lab Tests Reviewed: [address] Admission Reviewed    Imaging:    Imaging Reports Reviewed Today Include: all  Imaging Personally Reviewed by Myself Includes:      Recent Cultures (last 7 days):           Last 24 Hours Medication List:   Current Facility-Administered Medications   Medication Dose Route Frequency Provider Last Rate    acetaminophen  650 mg Oral Q4H PRN Prosper Stuart MD      aluminum-magnesium hydroxide-simethicone  30 mL Oral Q6H PRN Prosper Stuart MD      atorvastatin  40 mg Oral QPM Prosper Stuart MD      docusate sodium  100 mg Oral BID Prosper Stuart MD      insulin lispro  1-5 Units Subcutaneous TID AC Prosper Stuart MD      ondansetron  4 mg Intravenous Q6H PRN Prosper Stuart MD      senna  1 tablet Oral Daily Prosper Stuart MD      sevelamer  800 mg Oral TID With Meals Livia Stokes MD      sodium bicarbonate infusion  60 mL/hr Intravenous Continuous Livia Stokes MD          Today, Patient Was Seen By: Demetrio Lira MD    ** Please Note: Dictation voice to text software may have been used in the creation of this document   **

## 2021-02-16 NOTE — ASSESSMENT & PLAN NOTE
Hemoglobin stable at 10 6, workup ordered per Nephrology to rule out MM  Transfuse if hemoglobin less than 7

## 2021-02-16 NOTE — BRIEF OP NOTE (RAD/CATH)
INTERVENTIONAL RADIOLOGY PROCEDURE NOTE    Date: 2/16/2021    Procedure: IR NEPHROSTOMY TUBE PLACEMENT    Preoperative diagnosis:   1  Aphasia determined by examination    2  Fall    3  Expressive aphasia    4  VELVET (acute kidney injury) ([address] )    5  Bilateral hydronephrosis         Postoperative diagnosis: Same  Surgeon: Ronny Epps MD     Assistant: None  No qualified resident was available  Blood loss: 0    Specimens: urine for culture     Findings: left nephrostomy placement 8 5 Serbian    Dilated system, obstruction at the pelvic brim    Urine turned a little bloody at the end the case due to ureteral irritation    Complications: None immediate      Anesthesia: conscious sedation     Plan:  Flush 10 cc 2-3 times daily    Initiate anticoagulation in the morning if the blood in his of the urine has decreased, would suggest non bolus heparin drip    If remains bloody may be appropriate to hold on anticoagulation briefly

## 2021-02-16 NOTE — DISCHARGE INSTRUCTIONS
Nephrostomy Tube Care     WHAT YOU NEED TO KNOW:   A nephrostomy tube is a catheter (thin plastic tube) that is inserted through your skin and into your kidney  The nephrostomy tube drains urine from your kidney into a collecting bag outside your body  You may need a nephrostomy tube when something is blocking the normal flow of urine  A nephrostomy tube may be used for a short or a long period of time  The nephrostomy tube comes out of your back, so you will need someone to help care for your nephrostomy tube  DISCHARGE INSTRUCTIONS:      How to clean the skin around the nephrostomy tube and change the bandage:  Since the nephrostomy tube comes out of your back, you will not be able to care for it by yourself  Ask someone to follow the general directions below to check and care for your nephrostomy tube  Gather the items you will need  Disposable (single use) under-pad, and a clean washcloth  ¨ Plain soap, warm water, and new medical gloves  ¨ Sterile gauze bandages  ¨ Clear adhesive dressing or medical tape  ¨ Skin barrier  ¨ Protective skin film  ¨ Trash bag  · Remove the old bandage, and check the tube entry site  ¨ Have the patient lie on his side with the nephrostomy tube entry site facing up  Place the under-pad where it will catch drainage as you are working with the nephrostomy tube  ¨ Wash your hands with soap and water  Put on new medical gloves  ¨ Gently remove the old bandage, without pulling on the tube  Do this by holding the skin beside the tube with one hand  With the other hand, gently remove sticky tape and the skin barrier by pulling in the same direction as hair growth  Do not touch the side of the bandage that is placed over or around the tube  Throw the bandage and skin barrier away in a trash bag  ¨ Look for signs of infection, such as skin redness and swelling  Report any skin changes to healthcare providers  ¨ Clean the tube entry site      ¨ Hold the tube in place to keep it from being pulled out while you are cleaning around it  ¨ You will need to clean the area twice  For the first cleaning, wet a new gauze bandage with soap and water  Begin at the entry site of the tube  Wipe the skin in circles, moving away from the entry site  Remove blood and any other material with the gauze  Do this as often as needed  Use a new gauze bandage each time you clean the area, moving away from the entry site  ¨ For the second cleaning, wet a new gauze bandage with water  Begin at the entry site of the tube  Wipe the skin in circles, moving away from the entry site  Use a new gauze bandage each time you clean the area, moving away from the entry site  ¨ Gently pat the skin with a clean washcloth to dry it  · Apply the skin barrier and bandages  ¨ Roll up a bandage to make it thick, and place it under  the place where the tube enters the skin  Place it to support the tube, and stop it from kinking or bending  Tape the bandage in place, and apply more bandages if directed by a healthcare provider  ¨ Bring the tubing forward to the front and tape it to the skin  Do not stretch the tube tight, because this may pull the nephrostomy tube out  How often to change the bandage  Change the bandage around the tube, every other day  If your bandages  get dirty or wet, change them right away, and as often as needed  If your nephrostomy tube is to be used for a long period of time, the tube needs to be changed every 2 to 3 months  Healthcare providers will tell you when you need to make an appointment to have your tube changed  How to care for the urine drainage bag:   · Ask if you need to measure and write down how much urine is in the bag before you empty it  Drain urine out of the drainage bag when it is ½ to ? full  Open the spout at the bottom of the bag to empty the urine into the toilet  · You may need to detach the drainage bag from the nephrostomy tube to change it    If so, attach a new drainage bag tightly to the nephrostomy tube  ·   How to prevent problems with your nephrostomy tube:   · Change bandages, directed  This helps to prevent infection  Throw away or clean your drainage bag as directed by your healthcare provider  · Wipe the connecting ends of the drainage bag with alcohol before you reconnect the bag to the tube  This helps prevent infection  Keep the tube taped to your skin and connected to a drainage bag placed below the level of your kidneys  This helps prevent urine from backing up into your kidneys  You may wear a small drainage bag strapped to your leg to let you move around more easily  · Check the catheter to be sure it is in place after you change your clothes or do other activities  Do not wear tight clothing over the tube  Place the tubing over your thigh rather than under it when you are sitting down  Be sure that nothing is pulling on the nephrostomy tube when you move around  · Change positions if you see little or no urine in your drainage bag  Check to see if the urine tube is twisted or bent  Be sure that you are not sitting or lying on the tube  If there are no kinks and there is little or no urine in the drainage bag, tell your healthcare provider  · Flush out the tube as directed  Some tubes get flushed one time a day with 10 mls of NSS You will be given a prescription for the flushes  To flush the nephrostomy tube, clean both connections with alcohol swap  Twist off the drainage bag tube and twist the saline syringe into the nephrostomy tube and flush briskly  Remove the syringe and twist the drainage bag tube back into the nephrostomy tube  · Keep the site covered while you shower  Tape a piece of clear adhesive plastic over the dressing to keep it dry while you shower  Do not take tub baths      Contact Interventional Radiology at [phone] [facility] PATIENTS: Contact Interventional Radiology at [phone]) [facility] PATIENTS: Contact Interventional Radiology at [phone]) if:  · The skin around the nephrostomy tube is red, swollen, itches, or has a rash  · You have a fever greater than 101 or chills  · You have lower back or hip pain  · There are changes in how your urine looks or smells  · You have little or no urine draining from the nephrostomy tube  · You have nausea and are vomiting  · The black abi on your tube has moved, or the tube is longer than when it was put in    · You have questions or concerns about your condition or care  · The nephrostomy tube comes out completely  · There is blood, pus, or a bad smell coming from the place where the tube enters your skin  · Urine is leaking around the tube  The following pharmacies carry the flush syringes  [facility]                     [facility]       [address]  Phone [phone]            Phone [phone]  [address] & [facility]                      [address]                                 [phone]  Phone [phone]            Phone [phone]    [facility]                                                                         [facility] [phone]  [address]    [facility]   Phone [phone]

## 2021-02-16 NOTE — PLAN OF CARE
Problem: OCCUPATIONAL THERAPY ADULT  Goal: Performs self-care activities at highest level of function for planned discharge setting  See evaluation for individualized goals  Description: Treatment Interventions: ADL retraining, Functional transfer training, UE strengthening/ROM, Endurance training, Patient/family training, Cognitive reorientation, Equipment evaluation/education, Fine motor coordination activities, Compensatory technique education, Activityengagement, Energy conservation          See flowsheet documentation for full assessment, interventions and recommendations  Outcome: Progressing  Note: Limitation: Decreased ADL status, Decreased UE strength, Decreased Safe judgement during ADL, Decreased cognition, Decreased endurance, Decreased self-care trans, Decreased high-level ADLs  Prognosis: Good  Assessment: Patient participated in Skilled OT session this date with interventions consisting of ADL re training with the use of correct body mechnaics,  therapeutic activities to: increase activity tolerance and increase OOB/ sitting tolerance   Upon arrival patient was found supine in bed  Pt demonstrated the following tasks: MOD A sup to sit, MOD A sit to stand, MOD A toilet transfer with RW  Pt ambulated to BR with RW and MOD A x 1 + 1 for IV mgmt  Pt with decreased balance and tendency to bend knees when ambulating - VCs to fix positioning  Pt required MIN A for toileting and hand washing routine  Pt very fatigued post bathroom activity  Patient requiring verbal cues for correct technique, cognitive assistance to anticipate next step and frequent rest periods  Patient continues to be functioning below baseline level, occupational performance remains limited secondary to factors listed above and increased risk for falls and injury  From OT standpoint, recommendation at time of d/c would be Short Term Rehab - pt son and wife in room during session   Reported that pt was completing tasks without assistnace at baseline  Patient to benefit from continued Occupational Therapy treatment while in the hospital to address deficits as defined above and maximize level of functional independence with ADLs and functional mobility  Pt was left in chair with alarm on and family present after session with all current needs met  The patient's raw score on the AM-PAC Daily Activity inpatient short form is 16, standardized score is 35 96, less than 39 4  Patients at this level are likely to benefit from DC to post-acute rehabilitation services  Please refer to the recommendation of the Occupational Therapist for safe DC planning       OT Discharge Recommendation: Post-Acute Rehabilitation Services  OT - OK to Discharge: Yes(to rehab when medically stable )

## 2021-02-16 NOTE — SEDATION DOCUMENTATION
Left nephrostomy tube placed by Dr India El  Patient tolerated well and denies pain  VSS    Report given to RN and patient to return to room for two hour bedrest

## 2021-02-16 NOTE — ASSESSMENT & PLAN NOTE
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

## 2021-02-16 NOTE — PROGRESS NOTES
PHYSICAL MEDICINE AND REHABILITATION PROGRESS NOTE  Logan Gray 81 y o  male MRN: [id number]  Unit/Bed#: [facility] 904-01 Encounter: [id number]    Requested by (Physician/Service): Ifeoma Oh MD  Reason for Consultation:  Assessment of rehabilitation needs    Assessment:  Rehabilitation Diagnosis:   · Multiple left MCA CVA  · Aphasia   · Impaired mobility and self care   Impaired cognition      Recommendations:  Rehabilitation Plan:   Continue PT/OT (SLP) while on acute care   I spoke with Gregory's wife today tog et a better picture of home support  She is able to assist full time with basic ADLs including eating, grooming, dressing, bathing, meal prep, as well as basic assistance for transfers and ambulation  Her son is able to assist PRN for more physical tasks  The home is 2 stories however they have already begun transitioning the first floor to accommodate a new bedroom for [place]  He does have significant rehabilitation and medical needs at this point  He remains a good candidate for acute inpatient rehab, however the issues regarding his bladder cancer, elevated creatinine, and hydronephrosis would need to be addressed  He is possibly for a perc nephrostomy tube today and we will have to see how his kidney function and outpt respond to this   Covid-19 Testing: [facility] rehabilitation units require testing within 48 hours of potential admission for all general admissions  Please re-test if needed  *Re-testing is NOT required for patients recovering from COVID-19 infection if isolation has been discontinued per CDC criteria  Medical Co-morbidities Plan:  · Left MCA CVA  · Aphasia  · Ambulatory dysfunction with fall  · 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,Thoracic spine  · Pulmonary emboli  · VELVET with hydronephrosis  · Anemia  · Need for Bicarb gtt  · Bowel: Large BM on 2/14 with subsequent small watery stools  · Bladder:  For L PCN today  · DVT ppx: SCD only at this time    Do not hesitate to contact service with further questions  Karl Overall, DO  PM&R    History of Present Illness:  Sophie Mccall is a 81 y o  male with a PMH of metastatic bladder cancer receiving infusions at [facility], history of DVT/PE on [place] who presented to the [facility] on 2/10/21 with intermittent trouble speaking and fall  CT of the head showed no acute abnormality  MRI showed cluster of acute to early subacute embolic infarcts in the left MCA territory within the inferior parietal temporal region  Few scattered embolic infarcts in the right frontoparietal region and right cerebellar hemisphere  There was no evidence of CNS metastases  He was noted to have a PE as well and was placed on a heparin drip  Urology and oncology consult is being recommended  PM&R are consulted for rehabilitation recommendations  Since our last visit, he developed VELVET and possible ATN due to dye  The imaging revealed bladder mass with obstruction causing hydronephrosis  He is for Perc Nephrostomy tube placement by IR today  Review of Systems: ROS was limited due to aphasia, but he is feeling tired, difficulty with speech and ambulation  Denies fever, chills, or significant pain  Function:  Prior level of function and living situation:  The patient lives with his spouse in a 2 story home with 2 KAILA in the front and 1 KAILA in the rear  Bedroom and bathroom are on the seoncd floor, but they are converting a room downstairs to a bedroom for him  He was independent for ambulation and drove  Per documentation spouse can not major physical assistance b ut can assist with ADLs and limited physical help for transfers and mobility, son lives nearby and works but can provide daily checks      Current level of function:  Physical Therapy: Transfers with Mod A, Bed mobility Min A, Ambulation 40ft +10ft with RW with Mod A x1  Occupational Therapy: grooming min A, LB ADLs Mod A, Toileting supervision  Speech Therapy: soft/level 3 diet and nectar thick liquids       Physical Exam:  /85   Pulse 96   Temp 98 1 °F (36 7 °C)   Resp 18   Ht 5' 10" (1 778 m) Comment: per out pt records  Wt 76 5 kg (168 lb 10 4 oz)   SpO2 95%   BMI 24 20 kg/m²        Intake/Output Summary (Last 24 hours) at 2/16/2021 1032  Last data filed at 2/16/2021 0200  Gross per 24 hour   Intake 1092 ml   Output 700 ml   Net 392 ml       Body mass index is 24 2 kg/m²  Physical Exam  Constitutional:       Appearance: He is not ill-appearing  HENT:      Head: Normocephalic and atraumatic  Right Ear: External ear normal       Left Ear: External ear normal       Nose: No rhinorrhea  Mouth/Throat:      Mouth: Mucous membranes are moist    Eyes:      Extraocular Movements: Extraocular movements intact  Neck:      Musculoskeletal: Normal range of motion  Cardiovascular:      Rate and Rhythm: Normal rate  Pulses: Normal pulses  Pulmonary:      Effort: Pulmonary effort is normal  No respiratory distress  Abdominal:      General: There is no distension  Palpations: Abdomen is soft  Musculoskeletal:      Right lower leg: No edema  Left lower leg: No edema  Skin:     General: Skin is warm  Neurological:      Mental Status: He is alert  Comments: Aphasia but with some interval improvement, able to say hello and response with mostly appropriate responses with some delay in processing  Strong in all muscle groups, but difficult to complete complex or multistep tasks  Some receptive difficulties even with 1 step commands during MMT      Psychiatric:         Mood and Affect: Mood normal            Social History:    Social History     Socioeconomic History    Marital status: /Civil Union     Spouse name: Not on file    Number of children: Not on file    Years of education: Not on file    Highest education level: Not on file   Occupational History    Not on file   Social Needs    Financial resource strain: Not on file    Food insecurity     Worry: Not on file     Inability: Not on file    Transportation needs     Medical: Not on file     Non-medical: Not on file   Tobacco Use    Smoking status: Not on file   Substance and Sexual Activity    Alcohol use: Not on file    Drug use: Not on file    Sexual activity: Not on file   Lifestyle    Physical activity     Days per week: Not on file     Minutes per session: Not on file    Stress: Not on file   Relationships    Social connections     Talks on phone: Not on file     Gets together: Not on file     Attends Christian service: Not on file     Active member of club or organization: Not on file     Attends meetings of clubs or organizations: Not on file     Relationship status: Not on file    Intimate partner violence     Fear of current or ex partner: Not on file     Emotionally abused: Not on file     Physically abused: Not on file     Forced sexual activity: Not on file   Other Topics Concern    Not on file   Social History Narrative    Not on file        Family History:    No family history on file        Medications:     Current Facility-Administered Medications:     acetaminophen (TYLENOL) tablet 650 mg, 650 mg, Oral, Q4H PRN, Siim Freire MD    aluminum-magnesium hydroxide-simethicone (MYLANTA) oral suspension 30 mL, 30 mL, Oral, Q6H PRN, Simi Freire MD    atorvastatin (LIPITOR) tablet 40 mg, 40 mg, Oral, QPM, Simi Freire MD, 40 mg at 02/15/21 1712    docusate sodium (COLACE) capsule 100 mg, 100 mg, Oral, BID, Simi Freire MD, 100 mg at 02/15/21 1712    insulin lispro (HumaLOG) 100 units/mL subcutaneous injection 1-5 Units, 1-5 Units, Subcutaneous, TID AC **AND** Fingerstick Glucose (POCT), , , TID AC, Simi Freire MD    ondansetron (ZOFRAN) injection 4 mg, 4 mg, Intravenous, Q6H PRN, Simi Freire MD    senna (SENOKOT) tablet 8 6 mg, 1 tablet, Oral, Daily, Simi Freire MD, 8 6 mg at 02/15/21 6582   sevelamer (RENAGEL) tablet 800 mg, 800 mg, Oral, TID With Meals, Elda Burns MD    sodium bicarbonate 75 mEq in sodium chloride 0 45 % 1,000 mL infusion, 60 mL/hr, Intravenous, Continuous, Elda Burns MD    Past Medical History:     Past Medical History:   Diagnosis Date    Bladder cancer metastasized to lung [facility]) 10/2020    CVA (cerebral vascular accident) ([facility] [address] ) 02/10/2021    Pulmonary emboli ([facility] [address] ) 10/2020        Past Surgical History:     No past surgical history on file  Allergies:     No Known Allergies        LABORATORY RESULTS:      Lab Results   Component Value Date    HGB 10 6 (L) 02/16/2021    HCT 32 1 (L) 02/16/2021    WBC 9 32 02/16/2021     Lab Results   Component Value Date    BUN 96 (H) 02/16/2021    K 5 1 02/16/2021     02/16/2021    GLUCOSE 132 02/10/2021    CREATININE 7 97 (H) 02/16/2021     Lab Results   Component Value Date    PROTIME 16 6 (H) 02/11/2021    INR 1 34 (H) 02/11/2021        DIAGNOSTIC STUDIES: Reviewed  Ct Abdomen Pelvis Wo Contrast    Result Date: 2/15/2021  Impression: There is moderate left hydroureteronephrosis, likely due to patient's bladder cancer, since there is eccentric, partially calcified left sided urinary bladder wall thickening (series 2 images 70-76 ) There is no right-sided hydronephrosis  There is a nonobstructing small right renal upper pole calyceal stone  There is a 1 7 cm diameter lytic metastasis in T12 vertebral body (series 2 image 16 and series 602 image 96 ) There are also liver metastases, right lower lobe pulmonary nodules that are likely metastases, and retroperitoneal adenopathy as described above  Workstation performed: [id number]     Cta Head And Neck W Wo Contrast    Result Date: 2/10/2021  Impression: Left distal M2/and M3 occlusion  Multiple apical pulmonary nodules largest measuring 1 3 cm concerning for possible neoplasm   Based on current Fleischner Society 2017 Guidelines on incidental pulmonary nodule, follow-up nonemergent outpatient enhanced chest CT and/or PET/CT recommended for further evaluation  These could be neoplastic/metastatic, correlate clinically  I personally discussed this study with Aden Rock on 2/10/2021 at 4:37 PM  Workstation performed: [id number]     Ct Head Wo Contrast    Result Date: 2/10/2021  Impression: No evidence of acute vascular territorial infarction, intracranial hemorrhage or mass effect  Workstation performed: [id number]     Trauma - Ct Head Wo Contrast    Result Date: 2/10/2021  Impression: No acute intracranial abnormality  Microangiopathic changes  I personally discussed this study with Dolly Pereira on 2/10/2021 at 2:50 PM  Workstation performed: [id number]     Trauma - Ct Spine Cervical Wo Contrast    Result Date: 2/10/2021  Impression: 1  No acute cervical spine fracture or traumatic malalignment  2   Multiple somewhat ill-defined biapical pulmonary nodules measuring up to 1 4 cm in the left upper lobe  While these may be postinflammatory/postinfectious in nature, underlying malignancy to be excluded  Recommend dedicated CT of the entire chest   Tube useful to compare to any prior studies the patient may have had  See above  I personally discussed this study with Dolly Pereira on 2/10/2021 at 2:39 PM  Workstation performed: [id number]     Mri Brain W Wo Contrast    Result Date: 2/11/2021  Impression: 1  Cluster of acute to early subacute embolic infarcts in the left MCA territory, within the inferior parietal temporal region  Few scattered embolic infarcts in the right frontoparietal region and right cerebellar hemisphere  Possible central/cardiac  source of emboli  2   No evidence of CNS metastases  The study was marked in [facility] for immediate notification   Workstation performed: [id number]     Xr Chest 1 View    Result Date: 2/11/2021  Impression: No acute traumatic injury No active cardiopulmonary disease  Workstation performed: [id number]     Xr Trauma Multiple    Result Date: 2/10/2021  Impression: No acute traumatic injury No active cardiopulmonary disease  Workstation performed: [id number]     Us Kidney And Bladder    Result Date: 2/15/2021  Impression: 1  Mild to moderate bilateral hydronephrosis, worse on the left  Recommend follow-up with CT of the abdomen and pelvis to assess for any obstructing cause  2   Spangler catheter decompresses the bladder limiting evaluation  3   Incidentally noted is a left pleural effusion  The study was marked in [facility] for immediate notification   Workstation performed: [id number]     Corey Rodas DO  Physical Medicine and [place]

## 2021-02-16 NOTE — PROGRESS NOTES
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  CKD STAGE 3:  Felt secondary to acute tubular necrosis CT angiography on 02/10  CURRENT CREATININE:  WORSENING AND 7 97  Workup:  -? BASELINE IT WAS 1 3-1 6 as discussed with his primary caregiver/oncologist at [facility]   -UA:  Small occult blood/1+ proteinuria/2-4 RBCs/2-4 wbc's  -repeat UA:  Moderate blood/2+ proteinuria although no RBCs? RULE OUT RHABDOMYOLYSIS WITH MODERATE BLOOD BUT NO RBCS  -urine protein creatinine ratio  -ULTRASOUND:  MILD TO MODERATE BILATERAL HYDRONEPHROSIS WORSE ON LEFT  -CT SCAN:  MODERATE LEFT HYDROURETERONEPHROSIS LIKELY DUE TO PATIENT'S BLADDER CANCER WITH LEFT-SIDED CALCIFIED THICKENING; NO RIGHT HYDRONEPHROSIS; LYTIC METASTASES T12 VERTEBRAL BODY ALSO LIKELY LIVER METASTASES RIGHT LOWER LOBE PULMONARY NODULES LIKELY METASTASES AND RETROPERITONEAL ADENOPATHY      · THERE IS NO IMMEDIATE EMERGENT INDICATIONS FOR RENAL PLACEMENT THERAPY AT THIS TIME BUT WILL MOST LIKELY REQUIRED IF HE DOES NOT GET BETTER WITH RESOLUTION OF HIS LEFT HYDRONEPHROSIS  Treatment:   -STAT PCN ON THE LEFT DISCUSSED WITH ED FROM UROLOGY  -HOLD FUROSEMIDE FOR NOW  -LOW LEVEL BICARBONATE DRIP  -avoid hypotension/hypoperfusion  -avoid nephrotoxic agents and further contrast if possible  -I will contact the family:  Patient may require renal placement therapy/hemodialysis over the next 24-48 hours if no improvement     2  Blood pressure:  Patient is on midodrine  I will place hold parameters over treat      3  Electrolytes MILD METABOLIC ACIDOSIS BE TREATED WITH BICARBONATE DRIP     4   MBD renal disease:  -elevated calcium:  Avoid calcium products vitamin-D  - ionized calcium:  NORMAL  -check PTH intact level:  PENDING  -if persistent consider further workup:  MOST LIKELY RELATED TO METASTATIC CANCER  -TREAT HYPERPHOSPHATEMIA WITH SEVELAMER     5  Anemia[de-identified]  CURRENT HEMOGLOBIN ACCEPTABLE AT 10 6  -rule out multiple myeloma given worsening renal function       :  CHECK SPEP/UPEP/LIGHT CHAIN RATIO:  PENDING  -IRON STUDIES:  ADEQUATE  -CHECK STOOL FOR OCCULT BLOOD  -transfuse as needed per primary service for hemoglobin less than 7 0    DISCUSSED WITH UROLOGY  DISCUSSED WITH SLIM  Discussed with his wife Dawson   She is aware the possible need for kidney dialysis    Subjective: The patient is asymptomatic  Seems fairly coherent today  No chest pain or shortness of breath  No nausea vomiting or diarrhea  Objective:     Vitals: Blood pressure 161/86, pulse 96, temperature 98 °F (36 7 °C), resp  rate 18, height 5' 10" (1 778 m), weight 76 5 kg (168 lb 10 4 oz), SpO2 95 %  ,Body mass index is 24 2 kg/m²  Weight (last 2 days)     Date/Time   Weight    02/16/21 0600   76 5 (168 65)    02/15/21 0600   66 9 (147 49)    02/14/21 0600   66 8 (147 27)                Intake/Output Summary (Last 24 hours) at 2/16/2021 0843  Last data filed at 2/16/2021 0200  Gross per 24 hour   Intake 1152 ml   Output 700 ml   Net 452 ml       Urethral Catheter Double-lumen 16 Fr   (Active)   Reasons to continue Urinary Catheter  Acute urinary retention/obstruction failing urinary retention protocol 02/15/21 0900   Site Assessment Clean;Skin intact 02/15/21 2100   Collection Container Standard drainage bag 02/15/21 2100   Securement Method Securing device (Describe) 02/15/21 2100   Output (mL) 250 mL 02/15/21 2212       Physical Exam: General:  No acute distress  Skin:  No acute rash  Eyes:  No scleral icterus and noninjected  ENT:  Moist mucous membranes  Neck:  Supple, no jugular venous distention, trachea midline, overall appearance is normal  Chest:  Clear to auscultation  CVS:  Regular rate and rhythm, without a rub or gallops  Abdomen:  Normal bowel sounds, soft and nontender and nondistended  Extremities:  No edema, and no cyanosis, no significant arthritic changes  Neuro:  No significant change with some residual expressive aphasia  Psych:  Alert                 Medications:    Scheduled Meds:  Current Facility-Administered Medications   Medication Dose Route Frequency Provider Last Rate    acetaminophen  650 mg Oral Q4H PRN Ilir Powell MD      aluminum-magnesium hydroxide-simethicone  30 mL Oral Q6H PRN Ilir Powell MD      atorvastatin  40 mg Oral QPM Ilir Powell MD      docusate sodium  100 mg Oral BID Ilir Powell MD      enoxaparin  1 mg/kg Subcutaneous Q24H [facility] Dominic Jones PA-C      furosemide  20 mg/hr Intravenous Continuous Shelton Fuller MD 20 mg/hr (02/16/21 0555)    insulin lispro  1-5 Units Subcutaneous TID AC Ilir Powell MD      magnesium oxide  400 mg Oral Daily MILAGRO Chadwick      midodrine  2 5 mg Oral BID AC Shelton Fuller MD      ondansetron  4 mg Intravenous Q6H PRN Ilir Powell MD      senna  1 tablet Oral Daily Ilir Powell MD      sodium chloride  60 mL/hr Intravenous Continuous Shelton Fuller MD 60 mL/hr (02/16/21 0217)       PRN Meds:   acetaminophen    aluminum-magnesium hydroxide-simethicone    ondansetron    Continuous Infusions:furosemide, 20 mg/hr, Last Rate: 20 mg/hr (02/16/21 0555)  sodium chloride, 60 mL/hr, Last Rate: 60 mL/hr (02/16/21 0217)        Lab, Imaging and other studies: I have personally reviewed pertinent labs    Laboratory Results:  Results from last 7 days   Lab Units 02/16/21  0612 02/15/21  0523 02/14/21  0453 02/13/21  0952 02/12/21  0510 02/11/21  0554 02/10/21  2334 02/10/21  1407  02/10/21  1403   WBC Thousand/uL 9 32 9 94 7 62 10 01 9 15 8 63 10 65* 9 98   < >  --    HEMOGLOBIN g/dL 10 6* 10 5* 9 2* 11 4* 11 5* 8 9* 10 7* 11 4*   < >  --    I STAT HEMOGLOBIN g/dl  --   --   --   --   --   --   --   --   --  11 2*   HEMATOCRIT % 32 1* 31 8* 28 3* 34 7* 35 6* 28 3* 32 7* 36 1*   < >  --    HEMATOCRIT, ISTAT %  --   --   --   --   --   --   --   --   --  33*   PLATELETS Thousands/uL 213 212 176 200 214 152 190 211   < >  --    POTASSIUM mmol/L 5 1 4 9 4 6 4 2 4 1 3 5  --  4 1  --   --    CHLORIDE mmol/L 101 102 102 104 108 112*  --  105  --   --    CO2 mmol/L 20* 24 21 23 22 19*  --  25  --   --    CO2, I-STAT mmol/L  --   --   --   --   --   --   --   --   --  28   BUN mg/dL 96* 84* 64* 62* 54* 42*  --  44*  --   --    CREATININE mg/dL 7 97* 6 70* 4 45* 4 21* 3 08* 1 91*  --  1 68*  --   --    CALCIUM mg/dL 10 0 10 2* 8 6 10 3* 10 8* 9 2  --  10 7*  --   --    MAGNESIUM mg/dL 3 0*  --   --   --  2 1 1 7  --   --   --   --    PHOSPHORUS mg/dL 7 0*  --   --   --  4 0 3 1  --   --   --   --    GLUCOSE, ISTAT mg/dl  --   --   --   --   --   --   --   --   --  132    < > = values in this interval not displayed  Urinalysis:   Lab Results   Component Value Date    COLORU Yellow 02/15/2021    CLARITYU Clear 02/15/2021    SPECGRAV 1 008 02/15/2021    PHUR 7 0 02/15/2021    LEUKOCYTESUR Negative 02/15/2021    NITRITE Negative 02/15/2021    GLUCOSEU Negative 02/15/2021    KETONESU Negative 02/15/2021    BILIRUBINUR Negative 02/15/2021    BLOODU Moderate (A) 02/15/2021     ABGs: No results found for: Farhan 30  Radiology review:     Portions of the record may have been created with voice recognition software  Occasional wrong word or "sound a like" substitutions may have occurred due to the inherent limitations of voice recognition software  Read the chart carefully and recognize, using context, where substitutions have occurred

## 2021-02-16 NOTE — SPEECH THERAPY NOTE
Speech Language/Pathology  Speech/Language Pathology  Assessment    Patient Name: Joon CAO Date: 2/16/2021     Problem List  Principal Problem:    CVA (cerebral vascular accident) [facility])  Active Problems:    Fall    Pulmonary emboli ([facility] [address] )    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 ([facility] [address] )    ATN (acute tubular necrosis) (HCC)    VELVET (acute kidney injury) ([facility] [address] )    Anemia    Past Medical History  Past Medical History:   Diagnosis Date    Bladder cancer metastasized to lung ([facility] [address] ) 10/2020    CVA (cerebral vascular accident) ([facility] [address] ) 02/10/2021    Pulmonary emboli ([facility] [address] ) 10/2020     Past Surgical History  No past surgical history on file  Speech-Language Evaluation    Impression:  Pt  Presents w/ mild receptive, mod expressive aphasia w/ difficulty naming & noted paraphasic errors w/ attempts to complete tasks today  He has difficulty w/ increasingly complex language tasks & needs simple cues/commands  Recommendation:  Ongoing assessment & treatment      Therapy Prognosis: fair   Prognosis considerations: medical condition  Frequency: as able     Goals:  Long-term goal:  Patient will communicate wants, needs and opinions effectively with different conversational partners in a variety of ADLs      Short Term Goals:   Patient will complete unison counting (1-10, 1-20) with min assist    Patient will read automatic speech sequences with 90% accuracy and fluency      Patient will complete simple automatic phrases/sentences) with 60% accuracy and min cues  Patient will provide opposites with 60% accuracy given (min/mod/max) cues    Patient will name objects/pictures /people with 80% accuracy given (min) cues  Social:   Home w/ wife  Orientation:  Place: needs max cues to state name of hospital   City: -  Year:-  Month:-  ERICK: did not assess   Time of Day: did not assess  Hospital: able to correctly ID w/ choice of two      Reason for hospitalization: aware of stroke  Auditory Comprehension:  R/L discrim: poor, able to mostly ID the R side but accuracy is at least 50%  Body part ID: 60%, mild perseveration  One step commands: with max cues  Two step commands: -  Complex or 3 step commands: DNA  Picture ID: poor, unable to follow through w/ task  Personal y/n ?'s: wfl, 100%  Basic y/n ?'s: 80%  Complex y/n ?'s: 40%    Reading Comprehension:  Attempted letter, simple word reading  Pt unable to correctly read any of the material offered  Oral Expression:  Auto Sequences: severely impaired   ERICK: did not attempte   MARIELLE: able to name [id number] then stated Jennifer & stopped  Counting to 21: counted to four then stopped  Word Repetition: fairly functional to the multisyllabic words  Pt able to demo other words which he had difficulty with  Phrase Completion: pt attempted all but 3/10  were intelligible, most others were neologisms or w/ severe phonemic errors w/ the addition of /j/ on the end of it all  Responsive Namin% were correct  Picture Naming:   Object Naming:  Divergent Naming:  Picture Description: pt only able to state cookie, and driving the [employer]  Conversation: pt pleasant & able to greet & answer yes no to comfort questions      Written Expression:  NA today  Math:  NA today  Motor Speech:  Dysarthria: none noted  Apraxia: not noted  Cognitive -linguistic skills:  Grossly intact  Though ongoing assessment will be needed  Also noted:    Phonemic paraphasias  Perseveration    Results discussed with:      I

## 2021-02-17 LAB
ANION GAP SERPL CALCULATED.3IONS-SCNC: 18 MMOL/L (ref 4–13)
BACTERIA UR CULT: NORMAL
BASOPHILS # BLD AUTO: 0.04 THOUSANDS/ΜL (ref 0–0.1)
BASOPHILS NFR BLD AUTO: 0 % (ref 0–1)
BUN SERPL-MCNC: 102 MG/DL (ref 5–25)
CALCIUM SERPL-MCNC: 10.3 MG/DL (ref 8.3–10.1)
CHLORIDE SERPL-SCNC: 100 MMOL/L (ref 100–108)
CO2 SERPL-SCNC: 21 MMOL/L (ref 21–32)
CREAT SERPL-MCNC: 9 MG/DL (ref 0.6–1.3)
EOSINOPHIL # BLD AUTO: 0.08 THOUSAND/ΜL (ref 0–0.61)
EOSINOPHIL NFR BLD AUTO: 1 % (ref 0–6)
ERYTHROCYTE [DISTWIDTH] IN BLOOD BY AUTOMATED COUNT: 13.3 % (ref 11.6–15.1)
GFR SERPL CREATININE-BSD FRML MDRD: 5 ML/MIN/1.73SQ M
GLUCOSE SERPL-MCNC: 127 MG/DL (ref 65–140)
GLUCOSE SERPL-MCNC: 168 MG/DL (ref 65–140)
GLUCOSE SERPL-MCNC: 234 MG/DL (ref 65–140)
GLUCOSE SERPL-MCNC: 87 MG/DL (ref 65–140)
GLUCOSE SERPL-MCNC: 96 MG/DL (ref 65–140)
HCT VFR BLD AUTO: 33.3 % (ref 36.5–49.3)
HGB BLD-MCNC: 11 G/DL (ref 12–17)
IMM GRANULOCYTES # BLD AUTO: 0.08 THOUSAND/UL (ref 0–0.2)
IMM GRANULOCYTES NFR BLD AUTO: 1 % (ref 0–2)
KAPPA LC FREE SER-MCNC: 431.4 MG/L (ref 3.3–19.4)
KAPPA LC FREE/LAMBDA FREE SER: 14.38 {RATIO} (ref 0.26–1.65)
LAMBDA LC FREE SERPL-MCNC: 30 MG/L (ref 5.7–26.3)
LYMPHOCYTES # BLD AUTO: 1.16 THOUSANDS/ΜL (ref 0.6–4.47)
LYMPHOCYTES NFR BLD AUTO: 11 % (ref 14–44)
MCH RBC QN AUTO: 32.4 PG (ref 26.8–34.3)
MCHC RBC AUTO-ENTMCNC: 33 G/DL (ref 31.4–37.4)
MCV RBC AUTO: 98 FL (ref 82–98)
MONOCYTES # BLD AUTO: 0.92 THOUSAND/ΜL (ref 0.17–1.22)
MONOCYTES NFR BLD AUTO: 9 % (ref 4–12)
NEUTROPHILS # BLD AUTO: 8.44 THOUSANDS/ΜL (ref 1.85–7.62)
NEUTS SEG NFR BLD AUTO: 78 % (ref 43–75)
NRBC BLD AUTO-RTO: 0 /100 WBCS
PLATELET # BLD AUTO: 211 THOUSANDS/UL (ref 149–390)
PMV BLD AUTO: 9.8 FL (ref 8.9–12.7)
POTASSIUM SERPL-SCNC: 5.1 MMOL/L (ref 3.5–5.3)
RBC # BLD AUTO: 3.39 MILLION/UL (ref 3.88–5.62)
SODIUM SERPL-SCNC: 139 MMOL/L (ref 136–145)
WBC # BLD AUTO: 10.72 THOUSAND/UL (ref 4.31–10.16)

## 2021-02-17 PROCEDURE — 82948 REAGENT STRIP/BLOOD GLUCOSE: CPT

## 2021-02-17 PROCEDURE — 99233 SBSQ HOSP IP/OBS HIGH 50: CPT | Performed by: INTERNAL MEDICINE

## 2021-02-17 PROCEDURE — 85025 COMPLETE CBC W/AUTO DIFF WBC: CPT | Performed by: INTERNAL MEDICINE

## 2021-02-17 PROCEDURE — 99232 SBSQ HOSP IP/OBS MODERATE 35: CPT | Performed by: INTERNAL MEDICINE

## 2021-02-17 PROCEDURE — 99233 SBSQ HOSP IP/OBS HIGH 50: CPT | Performed by: UROLOGY

## 2021-02-17 PROCEDURE — 80048 BASIC METABOLIC PNL TOTAL CA: CPT | Performed by: INTERNAL MEDICINE

## 2021-02-17 RX ORDER — METHYLPREDNISOLONE SODIUM SUCCINATE 125 MG/2ML
60 INJECTION, POWDER, LYOPHILIZED, FOR SOLUTION INTRAMUSCULAR; INTRAVENOUS DAILY
Status: DISCONTINUED | OUTPATIENT
Start: 2021-02-17 | End: 2021-02-20 | Stop reason: HOSPADM

## 2021-02-17 RX ADMIN — SENNOSIDES 8.6 MG: 8.6 TABLET, FILM COATED ORAL at 09:09

## 2021-02-17 RX ADMIN — SODIUM BICARBONATE 60 ML/HR: 84 INJECTION, SOLUTION INTRAVENOUS at 23:15

## 2021-02-17 RX ADMIN — ATORVASTATIN CALCIUM 40 MG: 40 TABLET, FILM COATED ORAL at 00:25

## 2021-02-17 RX ADMIN — SODIUM BICARBONATE 60 ML/HR: 84 INJECTION, SOLUTION INTRAVENOUS at 04:52

## 2021-02-17 RX ADMIN — METHYLPREDNISOLONE SODIUM SUCCINATE 60 MG: 125 INJECTION, POWDER, FOR SOLUTION INTRAMUSCULAR; INTRAVENOUS at 09:10

## 2021-02-17 RX ADMIN — INSULIN LISPRO 2 UNITS: 100 INJECTION, SOLUTION INTRAVENOUS; SUBCUTANEOUS at 18:08

## 2021-02-17 RX ADMIN — DOCUSATE SODIUM 100 MG: 100 CAPSULE, LIQUID FILLED ORAL at 09:09

## 2021-02-17 NOTE — ASSESSMENT & PLAN NOTE
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

## 2021-02-17 NOTE — ASSESSMENT & PLAN NOTE
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

## 2021-02-17 NOTE — ASSESSMENT & PLAN NOTE
-Patient with acute kidney injury, worsening creatinine, not responding to bladder decompression   -Bladder mass blocking the left ureteral orifice  -s/p left nephrostomy tube placement 02/16/2021  -nephrostomy to drainage bag, draining light pink tinged urine, output 600 mL overnight  -continue to flush nephrostomy tube as prior recommendations  -creat 9 00 today, was 7 97 2/16/2021  -trend creatinine, Nephrology following  -iniguez catheter draining slight pink tinged urine

## 2021-02-17 NOTE — PROGRESS NOTES
Progress Note - Urology  Charly Almazan 1935, 81 y o  male MRN: [id number]    Unit/Bed#: [facility] 904-01 Encounter: [id number]    VELVET (acute kidney injury) [facility])  Assessment & Plan  -Patient with acute kidney injury, worsening creatinine, not responding to bladder decompression   -Bladder mass blocking the left ureteral orifice  -s/p left nephrostomy tube placement 02/16/2021  -nephrostomy to drainage bag, draining light pink tinged urine, output 600 mL overnight  -continue to flush nephrostomy tube as prior recommendations  -creat 9 00 today, was 7 97 2/16/2021  -trend creatinine, Nephrology following  -iniguez catheter draining slight pink tinged urine           Subjective:  Resting in bed  No complaints offered  Patient has expressive aphasia    24 HR EVENTS:   no significant events  Patient has  no complaints  Review of Systems   Constitutional: Negative for activity change, appetite change, chills, fatigue, fever and unexpected weight change  HENT: Negative for facial swelling  Eyes: Negative for discharge  Respiratory: Negative  Negative for cough and shortness of breath  Cardiovascular: Negative for chest pain and leg swelling  Gastrointestinal: Negative  Negative for abdominal distention, abdominal pain, constipation, diarrhea, nausea and vomiting  Endocrine: Negative  Genitourinary: Negative  Negative for decreased urine volume, flank pain and hematuria  Iniguez catheter   Musculoskeletal: Negative for back pain and myalgias  Skin: Negative for pallor and rash  Allergic/Immunologic: Negative  Negative for immunocompromised state  Neurological: Negative for facial asymmetry and speech difficulty  Psychiatric/Behavioral: Negative for agitation and confusion  Objective:  Nursing Rounds:   Vitals: Blood pressure 155/91, pulse 102, temperature 98 °F (36 7 °C), resp  rate 20, height 5' 10" (1 778 m), weight 76 4 kg (168 lb 6 9 oz), SpO2 95 %  ,Body mass index is 24 17 kg/m²  INS & OUTS:  I/O last 24 hours: In: 120 [P O :120]  Out: 2190 [Urine:2190]    Physical Exam  Vitals signs and nursing note reviewed  Constitutional:       General: He is not in acute distress  Appearance: Normal appearance  He is not ill-appearing or toxic-appearing  HENT:      Head: Normocephalic  Pulmonary:      Effort: Pulmonary effort is normal  No respiratory distress  Abdominal:      General: Abdomen is flat  There is no distension  Palpations: Abdomen is soft  There is no mass  Tenderness: There is no abdominal tenderness  There is no guarding or rebound  Genitourinary:     Comments: Spangler catheter patent for slightly pink tinged urine  Left nephrostomy tube patent for slightly pink tinged urine  Musculoskeletal:         General: No swelling  Skin:     General: Skin is warm and dry  Neurological:      General: No focal deficit present  Mental Status: He is alert and oriented to person, place, and time  Psychiatric:         Mood and Affect: Mood normal          Behavior: Behavior normal          Imaging:  CT ABDOMEN AND PELVIS WITHOUT IV CONTRAST     INDICATION:   Bladder cancer, invasive, treated  Metastatic bladder cancer  Jakob Chen's note from 2/15/2021 was reviewed  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, treated at [facility] in clinical trial   Renal ultrasound demonstrated   hydronephrosis      COMPARISON:  Renal ultrasound from 2/15/2021      TECHNIQUE:  CT examination of the abdomen and pelvis was performed without intravenous contrast   Axial, sagittal, and coronal 2D reformatted images were created from the source data and submitted for interpretation       Radiation dose length product (DLP) for this visit:  357 99 mGy-cm     This examination, like all CT scans performed in the [facility], was performed utilizing techniques to minimize radiation dose exposure, including the use of iterative   reconstruction and automated exposure control       Enteric contrast was not administered       FINDINGS:     ABDOMEN     Evaluation of the solid organs of the abdomen are limited without IV contrast      LOWER CHEST:  There is a 8 mm right lower lobe pulmonary nodule (series 2 image 1) and another 11 mm right lower lobe pulmonary nodule (series 2 image 2 )     Moderate bilateral pleural effusions  There is dependent atelectasis at the lung bases bilaterally      LIVER/BILIARY TREE:  There are multiple ill-defined hypodense lesions in the liver  although evaluation is limited without IV contrast, these are likely metastases  Largest is a left lobe segment 4 lesion measuring 2 8 x 2 8 cm (series 2 image 10 )     GALLBLADDER:  There is layering hyperdense material within the gallbladder, which could be biliary sludge, or vicarious excretion of contrast material      SPLEEN:  Unremarkable      PANCREAS:  Unremarkable      ADRENAL GLANDS:  Unremarkable      KIDNEYS/URETERS:  There is moderate left hydroureteronephrosis, likely due to patient's bladder cancer, since there is eccentric, partially calcified left sided urinary bladder wall thickening (series 2 images 70-76 )     There is no right-sided hydronephrosis  There is a 4 mm right renal upper pole calyceal stone (series 2 image 30 )     STOMACH AND BOWEL:  Moderate hiatal hernia      APPENDIX:  No findings to suggest appendicitis      ABDOMINOPELVIC CAVITY:  No ascites    No pneumoperitoneum      There is an enlarged left external iliac node measuring 1 7 cm (series 2 image 64 )  There is a enlarged right common iliac node measuring 1 5 cm (series 2 image 51 )     VESSELS:  Unremarkable for patient's age      PELVIS     REPRODUCTIVE ORGANS:  Unremarkable for patient's age      URINARY BLADDER:  Unremarkable      ABDOMINAL WALL/INGUINAL REGIONS:  Unremarkable      OSSEOUS STRUCTURES:  There is a 1 7 cm diameter lytic metastasis in T12 vertebral body (series 2 image 16 and series 602 image 96 )     IMPRESSION:     There is moderate left hydroureteronephrosis, likely due to patient's bladder cancer, since there is eccentric, partially calcified left sided urinary bladder wall thickening (series 2 images 70-76 )     There is no right-sided hydronephrosis  There is a nonobstructing small right renal upper pole calyceal stone      There is a 1 7 cm diameter lytic metastasis in T12 vertebral body (series 2 image 16 and series 602 image 96 )     There are also liver metastases, right lower lobe pulmonary nodules that are likely metastases, and retroperitoneal adenopathy as described above  Imaging reviewed - both report and images personally reviewed  Labs:  Recent Labs     02/15/21  0523 02/16/21  0612 02/17/21  0513   WBC 9 94 9 32 10 72*       Recent Labs     02/15/21  0523 02/16/21  0612 02/17/21  0513   HGB 10 5* 10 6* 11 0*     Recent Labs     02/15/21  0523 02/16/21  0612 02/17/21  0513   HCT 31 8* 32 1* 33 3*     Recent Labs     02/15/21  0523 02/16/21  0612 02/17/21  0513   CREATININE 6 70* 7 97* 9 00*     Lab Results   Component Value Date    HGB 11 0 (L) 02/17/2021    HCT 33 3 (L) 02/17/2021    WBC 10 72 (H) 02/17/2021     02/17/2021     Lab Results   Component Value Date    K 5 1 02/17/2021     02/17/2021    CO2 21 02/17/2021     (H) 02/17/2021    CREATININE 9 00 (H) 02/17/2021    CALCIUM 10 3 (H) 02/17/2021    GLUCOSE 132 02/10/2021         History:    Past Medical History:   Diagnosis Date    Bladder cancer metastasized to lung [facility]) 10/2020    CVA [facility]cerebral vascular accident) ([facility] [address] ) 02/10/2021    Pulmonary emboli ([facility] [address] ) 10/2020     Past Surgical History:   Procedure Laterality Date    IR NEPHROSTOMY TUBE PLACEMENT  2/16/2021     No family history on file    Social History     Socioeconomic History    Marital status: /Civil Union     Spouse name: Not on file    Number of children: Not on file    Years of education: Not on file    Highest education level: Not on file   Occupational History    Not on file   Social Needs    Financial resource strain: Not on file    Food insecurity     Worry: Not on file     Inability: Not on file    Transportation needs     Medical: Not on file     Non-medical: Not on file   Tobacco Use    Smoking status: Not on file   Substance and Sexual Activity    Alcohol use: Not on file    Drug use: Not on file    Sexual activity: Not on file   Lifestyle    Physical activity     Days per week: Not on file     Minutes per session: Not on file    Stress: Not on file   Relationships    Social connections     Talks on phone: Not on file     Gets together: Not on file     Attends Hoahaoism service: Not on file     Active member of club or organization: Not on file     Attends meetings of clubs or organizations: Not on file     Relationship status: Not on file    Intimate partner violence     Fear of current or ex partner: Not on file     Emotionally abused: Not on file     Physically abused: Not on file     Forced sexual activity: Not on file   Other Topics Concern    Not on file   Social History Narrative    Not on file       Yodit Fowler, [address]  Date: 2/17/2021 Time: 10:58 AM

## 2021-02-17 NOTE — PROGRESS NOTES
Progress Note - Logan Gray 1935, 81 y o  male MRN: [id number]    Unit/Bed#: [facility] 904-01 Encounter: [id number]    Primary Care Provider: Sandy Gamboa MD   Date and time admitted to hospital: 2/10/2021  1:55 PM        * CVA (cerebral vascular accident) [facility])  Assessment & Plan  · Per record review, on 2/10/21, patient was noted to be expressively aphasic, last known well at noon that day, with aphasia noted right after a fall  · Did not receive tPA due to Xarelto use  · Initial CT head with microangiopathic changes, no acute intracranial abnormality  CTA head/neck with left distal M2 and M3 occlusion however no occlusion was noted by Neurosurgeon per Neurology records  Repeat CT head from 2/10/21 due to worsening aphasia with no evidence of acute vascular territorial infarction, intracranial hemorrhage or mass effect  · MRI brain revealed cluster of acute to early subacute embolic infarcts in the left MCA territory, within the inferior parietal temporal region  Few scattered embolic infarcts in the frontoparietal region and right cerebellar hemisphere  Possible central/cardiac source  · Per Neurology on 2/12/21 the patient's heparin GTT was discontinued and he switched to Lovenox     Patient was made level 4 comfort care  Discharge to home hospice on Saturday      VELVET (acute kidney injury) [facility])  Assessment & Plan  · Creatinine 3 08 2/12/21, up from 1 91 the day prior, and baseline appears to be around 1 0  · Suspected secondary to ATN per Nephrology  Patient had CTA as above  · Ultrasound showed mild-to-moderate bilateral hydronephrosis, worse on the left  · CT abdomen pelvis without contrast showed moderate left hydroureteronephrosis, likely due to patient's bladder cancer since there is eccentric, partially calcified left-sided urinary bladder wall thickening  There is no right-sided hydronephrosis    1 7 cm diameter lytic metastases in T12 vertebral body, liver metastasis, right lower lobe pulmonary nodules, retroperitoneal adenopathy  · Discussed with nephrology, Urology and IR  · S/p  L PCN 2/16  · Nephrology discussed with 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, liver, T12  Follows at [facility]      Pulmonary emboli [facility])  Assessment & Plan  · Known history  Patient was on Xarelto   · Xarelto was held on admission and patient was initially placed on heparin GTT then transitioned to     Discontinue anticoagulation, patient is comfort care    Anemia  Assessment & Plan  Hemoglobin stable at [facility]  · Appreciate trauma input, they have since signed off  · Continue PT/OT   · Fall precautions         VTE Pharmacologic Prophylaxis:   Pharmacologic: Patient is comfort care  Mechanical VTE Prophylaxis in Place: Yes    Patient Centered Rounds: I have performed bedside rounds with nursing staff today  Discussions with Specialists or Other Care Team Provider:  Hospice, nursing, case management    Education and Discussions with Family / Patient:   son at bedside    Time Spent for Care: 30 minutes  More than 50% of total time spent on counseling and coordination of care as described above  Current Length of Stay: 7 day(s)    Current Patient Status: Inpatient   Certification Statement: The patient will continue to require additional inpatient hospital stay due to Pending discharge to home hospice on Saturday    Discharge Plan:  Discharge to home hospice on Saturday    Code Status: Level 4 - Comfort Care      Subjective:   Patient was seen and evaluated bedside    He is comfortable    Objective:     Vitals:   Temp (24hrs), Av 2 °F (36 8 °C), Min:98 °F (36 7 °C), Max:98 6 °F (37 °C)    Temp:  [98 °F (36 7 °C)-98 6 °F (37 °C)] 98 1 °F (36 7 °C)  HR:  [] 102  Resp:  [18-20] 18  BP: (138-164)/() 164/98  SpO2:  [94 %-95 %] 94 %  Body mass index is 24 17 kg/m²  Input and Output Summary (last 24 hours): Intake/Output Summary (Last 24 hours) at 2021 1758  Last data filed at 2021 1330  Gross per 24 hour   Intake 240 ml   Output 2575 ml   Net -2335 ml       Physical Exam:     Physical Exam  Constitutional:       General: He is not in acute distress  Appearance: He is ill-appearing  HENT:      Head: Atraumatic  Neck:      Musculoskeletal: Neck supple  Cardiovascular:      Rate and Rhythm: Normal rate and regular rhythm  Heart sounds: No murmur  No friction rub  No gallop  Pulmonary:      Effort: Pulmonary effort is normal  No respiratory distress  Breath sounds: Normal breath sounds  No wheezing  Abdominal:      General: Bowel sounds are normal  There is no distension  Palpations: Abdomen is soft  Tenderness: There is no abdominal tenderness  Comments: Left PCN   Musculoskeletal:         General: No swelling  Skin:     General: Skin is warm and dry  Neurological:      Mental Status: He is alert        Comments: Some expressive aphasia   Psychiatric:         Mood and Affect: Mood normal          Additional Data:     Labs:    Results from last 7 days   Lab Units 21  0513   WBC Thousand/uL 10 72*   HEMOGLOBIN g/dL 11 0*   HEMATOCRIT % 33 3*   PLATELETS Thousands/uL 211   NEUTROS PCT % 78*   LYMPHS PCT % 11*   MONOS PCT % 9   EOS PCT % 1     Results from last 7 days   Lab Units 21  0513 21  0612   SODIUM mmol/L 139 138   POTASSIUM mmol/L 5 1 5 1   CHLORIDE mmol/L 100 101   CO2 mmol/L 21 20*   BUN mg/dL 102* 96*   CREATININE mg/dL 9 00* 7 97*   ANION GAP mmol/L 18* 17*   CALCIUM mg/dL 10 3* 10 0 ALBUMIN g/dL  --  2 6*   TOTAL BILIRUBIN mg/dL  --  1 12*   ALK PHOS U/L  --  76   ALT U/L  --  57   AST U/L  --  100*   GLUCOSE RANDOM mg/dL 96 89     Results from last 7 days   Lab Units 02/11/21  0026   INR  1 34*     Results from last 7 days   Lab Units 02/17/21  1625 02/17/21  1052 02/17/21  0741 02/16/21  2050 02/16/21  1631 02/16/21  1046 02/16/21  0742 02/15/21  2101 02/15/21  1655 02/15/21  1155 02/15/21  0807 02/14/21  2042   POC GLUCOSE mg/dl 234* 127 87 97 97 106 93 90 104 106 102 108     Results from last 7 days   Lab Units 02/11/21  0554   HEMOGLOBIN A1C % 5 8*               * I Have Reviewed All Lab Data Listed Above  * Additional Pertinent Lab Tests Reviewed: Traaslan 66 Admission Reviewed    Imaging:    Imaging Reports Reviewed Today Include: all  Imaging Personally Reviewed by Myself Includes:      Recent Cultures (last 7 days):           Last 24 Hours Medication List:   Current Facility-Administered Medications   Medication Dose Route Frequency Provider Last Rate    acetaminophen  650 mg Oral Q4H PRN Bret Alcantar MD      aluminum-magnesium hydroxide-simethicone  30 mL Oral Q6H PRN Bret Alcantar MD      insulin lispro  1-5 Units Subcutaneous TID AC Bret Alcantar MD      methylPREDNISolone sodium succinate  60 mg Intravenous Daily Boyd Dang MD      ondansetron  4 mg Intravenous Q6H PRN Bret Alcantar MD      sodium bicarbonate infusion  60 mL/hr Intravenous Continuous Boyd Dang MD 60 mL/hr (02/17/21 0542)        Today, Patient Was Seen By: Munira Keenan MD    ** Please Note: Dictation voice to text software may have been used in the creation of this document   **

## 2021-02-17 NOTE — HOSPICE NOTE
Face to face was completed today by Sky Ivey and pt was approved for hospice care  However when I called to speak to pts wife she is adamant she does not want hospice a this time  She said the last hospice company she had "left a bad taste" in her mouth  I tried to educate her on [facility] but she does not want it at this time  She states she has some hired help and that pt see a  [facility] palliative provider   I gave her my direct number should she change her mind and up dated Tab Smith

## 2021-02-17 NOTE — HOSPICE NOTE
Met with pt and family at bedside  Patient is approved for home hospice, discussed home hospice support with wife and son and they are in agreement with same  Consents signed by wife  Attending escripted for comfort meds, nephrostomy tube flushes, ativan, oxycodone, haldol and prednisone to [facility] pharmacy here in hospital  Wife will  and have at home prior to hospice start of care  Equipment will be ordered for Friday delivery, (hospital bed, 2 sets of half rails, and overbed table ) pt will need transport home on Saturday AM for same day open to hospice  Wife and son have liaison number for any questions  MAYRA Mccloud aware of plan

## 2021-02-17 NOTE — CASE MANAGEMENT
CM consult for home hospice    TCT wife Letty Hebert, discussed hospice  She has no preference for an agency, would like to bring patient home  Provided names of providers, referral to [address] [place] [address] via Angela    PT accepted for home hospice  DC planned for Saturday    Met with patient, son and wife  Discussed DC on Saturday  Will schedule ambulance transport for him home    Meds will be sent to [facility] and they can be picked up prior to Saturday

## 2021-02-17 NOTE — ASSESSMENT & PLAN NOTE
· Known history   Patient was on Xarelto   · Xarelto was held on admission and patient was initially placed on heparin GTT then transitioned to     Discontinue anticoagulation, patient is comfort care

## 2021-02-17 NOTE — ASSESSMENT & PLAN NOTE
· Creatinine 3 08 2/12/21, up from 1 91 the day prior, and baseline appears to be around 1 0  · Suspected secondary to ATN per Nephrology  Patient had CTA as above  · Ultrasound showed mild-to-moderate bilateral hydronephrosis, worse on the left  · CT abdomen pelvis without contrast showed moderate left hydroureteronephrosis, likely due to patient's bladder cancer since there is eccentric, partially calcified left-sided urinary bladder wall thickening  There is no right-sided hydronephrosis  1 7 cm diameter lytic metastases in T12 vertebral body, liver metastasis, right lower lobe pulmonary nodules, retroperitoneal adenopathy  · Discussed with nephrology, Urology and IR  · S/p  L PCN 2/16  · Nephrology discussed with Sabina Stephens recommended hospice and discussed that with the patient's family  Patient was made comfort care, will transition to home hospice on Saturday  Solu-Medrol 60 mg daily and then transitioned to 60 mg daily of prednisone on discharge

## 2021-02-18 LAB
ALBUMIN SERPL ELPH-MCNC: 2.79 G/DL (ref 3.5–5)
ALBUMIN SERPL ELPH-MCNC: 49.9 % (ref 52–65)
ALBUMIN UR ELPH-MCNC: 61.4 %
ALPHA1 GLOB MFR UR ELPH: 7.4 %
ALPHA1 GLOB SERPL ELPH-MCNC: 0.48 G/DL (ref 0.1–0.4)
ALPHA1 GLOB SERPL ELPH-MCNC: 8.5 % (ref 2.5–5)
ALPHA2 GLOB MFR UR ELPH: 8.9 %
ALPHA2 GLOB SERPL ELPH-MCNC: 0.81 G/DL (ref 0.4–1.2)
ALPHA2 GLOB SERPL ELPH-MCNC: 14.5 % (ref 7–13)
B-GLOBULIN MFR UR ELPH: 6.5 %
BETA GLOB ABNORMAL SERPL ELPH-MCNC: 0.18 G/DL (ref 0.4–0.8)
BETA1 GLOB SERPL ELPH-MCNC: 3.2 % (ref 5–13)
BETA2 GLOB SERPL ELPH-MCNC: 3.9 % (ref 2–8)
BETA2+GAMMA GLOB SERPL ELPH-MCNC: 0.22 G/DL (ref 0.2–0.5)
GAMMA GLOB ABNORMAL SERPL ELPH-MCNC: 1.12 G/DL (ref 0.5–1.6)
GAMMA GLOB MFR UR ELPH: 15.8 %
GAMMA GLOB SERPL ELPH-MCNC: 20 % (ref 12–22)
GLUCOSE SERPL-MCNC: 109 MG/DL (ref 65–140)
GLUCOSE SERPL-MCNC: 161 MG/DL (ref 65–140)
GLUCOSE SERPL-MCNC: 167 MG/DL (ref 65–140)
GLUCOSE SERPL-MCNC: 179 MG/DL (ref 65–140)
IGG/ALB SER: 1 {RATIO} (ref 1.1–1.8)
INTERPRETATION UR IFE-IMP: NORMAL
INTERPRETATION UR IFE-IMP: NORMAL
M PROTEIN 1 MFR SERPL ELPH: 9.6 %
M PROTEIN 1 SERPL ELPH-MCNC: 0.54 G/DL
PROT PATTERN SERPL ELPH-IMP: ABNORMAL
PROT PATTERN UR ELPH-IMP: NORMAL
PROT SERPL-MCNC: 5.8 G/DL (ref 6.4–8.2)
PROT UR-MCNC: 124 MG/DL

## 2021-02-18 PROCEDURE — 99232 SBSQ HOSP IP/OBS MODERATE 35: CPT | Performed by: INTERNAL MEDICINE

## 2021-02-18 PROCEDURE — 82948 REAGENT STRIP/BLOOD GLUCOSE: CPT

## 2021-02-18 RX ADMIN — INSULIN LISPRO 1 UNITS: 100 INJECTION, SOLUTION INTRAVENOUS; SUBCUTANEOUS at 16:52

## 2021-02-18 RX ADMIN — SODIUM BICARBONATE 60 ML/HR: 84 INJECTION, SOLUTION INTRAVENOUS at 17:19

## 2021-02-18 RX ADMIN — INSULIN LISPRO 1 UNITS: 100 INJECTION, SOLUTION INTRAVENOUS; SUBCUTANEOUS at 12:13

## 2021-02-18 RX ADMIN — METHYLPREDNISOLONE SODIUM SUCCINATE 60 MG: 125 INJECTION, POWDER, FOR SOLUTION INTRAMUSCULAR; INTRAVENOUS at 09:55

## 2021-02-18 NOTE — PLAN OF CARE
Problem: Prexisting or High Potential for Compromised Skin Integrity  Goal: Skin integrity is maintained or improved  Description: INTERVENTIONS:  - Identify patients at risk for skin breakdown  - Assess and monitor skin integrity  - Assess and monitor nutrition and hydration status  - Monitor labs   - Assess for incontinence   - Turn and reposition patient  - Assist with mobility/ambulation  - Relieve pressure over bony prominences  - Avoid friction and shearing  - Provide appropriate hygiene as needed including keeping skin clean and dry  - Evaluate need for skin moisturizer/barrier cream  - Collaborate with interdisciplinary team   - Patient/family teaching  - Consider wound care consult   Outcome: Progressing     Problem: PAIN - ADULT  Goal: Verbalizes/displays adequate comfort level or baseline comfort level  Description: Interventions:  - Encourage patient to monitor pain and request assistance  - Assess pain using appropriate pain scale  - Administer analgesics based on type and severity of pain and evaluate response  - Implement non-pharmacological measures as appropriate and evaluate response  - Consider cultural and social influences on pain and pain management  - Notify physician/advanced practitioner if interventions unsuccessful or patient reports new pain  Outcome: Progressing     Problem: INFECTION - ADULT  Goal: Absence or prevention of progression during hospitalization  Description: INTERVENTIONS:  - Assess and monitor for signs and symptoms of infection  - Monitor lab/diagnostic results  - Monitor all insertion sites, i e  indwelling lines, tubes, and drains  - Monitor endotracheal if appropriate and nasal secretions for changes in amount and color  - Jones appropriate cooling/warming therapies per order  - Administer medications as ordered  - Instruct and encourage patient and family to use good hand hygiene technique  - Identify and instruct in appropriate isolation precautions for identified infection/condition  Outcome: Progressing  Goal: Absence of fever/infection during neutropenic period  Description: INTERVENTIONS:  - Monitor WBC    Outcome: Progressing     Problem: SAFETY ADULT  Goal: Patient will remain free of falls  Description: INTERVENTIONS:  - Assess patient frequently for physical needs  -  Identify cognitive and physical deficits and behaviors that affect risk of falls    -  [place] fall precautions as indicated by assessment   - Educate patient/family on patient safety including physical limitations  - Instruct patient to call for assistance with activity based on assessment  - Modify environment to reduce risk of injury  - Consider OT/PT consult to assist with strengthening/mobility  Outcome: Progressing  Goal: Maintain or return to baseline ADL function  Description: INTERVENTIONS:  -  Assess patient's ability to carry out ADLs; assess patient's baseline for ADL function and identify physical deficits which impact ability to perform ADLs (bathing, care of mouth/teeth, toileting, grooming, dressing, etc )  - Assess/evaluate cause of self-care deficits   - Assess range of motion  - Assess patient's mobility; develop plan if impaired  - Assess patient's need for assistive devices and provide as appropriate  - Encourage maximum independence but intervene and supervise when necessary  - Involve family in performance of ADLs  - Assess for home care needs following discharge   - Consider OT consult to assist with ADL evaluation and planning for discharge  - Provide patient education as appropriate  Outcome: Progressing  Goal: Maintain or return mobility status to optimal level  Description: INTERVENTIONS:  - Assess patient's baseline mobility status (ambulation, transfers, stairs, etc )    - Identify cognitive and physical deficits and behaviors that affect mobility  - Identify mobility aids required to assist with transfers and/or ambulation (gait belt, sit-to-stand, lift, walker, cane, etc )  - [place] fall precautions as indicated by assessment  - Record patient progress and toleration of activity level on Mobility SBAR; progress patient to next Phase/Stage  - Instruct patient to call for assistance with activity based on assessment  - Consider rehabilitation consult to assist with strengthening/weightbearing, etc   Outcome: Progressing     Problem: DISCHARGE PLANNING  Goal: Discharge to home or other facility with appropriate resources  Description: INTERVENTIONS:  - Identify barriers to discharge w/patient and caregiver  - Arrange for needed discharge resources and transportation as appropriate  - Identify discharge learning needs (meds, wound care, etc )  - Arrange for interpretive services to assist at discharge as needed  - Refer to Case Management Department for coordinating discharge planning if the patient needs post-hospital services based on physician/advanced practitioner order or complex needs related to functional status, cognitive ability, or social support system  Outcome: Progressing     Problem: Knowledge Deficit  Goal: Patient/family/caregiver demonstrates understanding of disease process, treatment plan, medications, and discharge instructions  Description: Complete learning assessment and assess knowledge base  Interventions:  - Provide teaching at level of understanding  - Provide teaching via preferred learning methods  Outcome: Progressing     Problem: Neurological Deficit  Goal: Neurological status is stable or improving  Description: Interventions:  - Monitor and assess patient's level of consciousness, motor function, sensory function, and level of assistance needed for ADLs  - Monitor and report changes from baseline  Collaborate with interdisciplinary team to initiate plan and implement interventions as ordered  - Provide and maintain a safe environment  - Consider seizure precautions  - Consider fall precautions  - Consider aspiration precautions    - Consider bleeding precautions  Outcome: Progressing     Problem: Activity Intolerance/Impaired Mobility  Goal: Mobility/activity is maintained at optimum level for patient  Description: Interventions:  - Assess and monitor patient  barriers to mobility and need for assistive/adaptive devices  - Assess patient's emotional response to limitations  - Collaborate with interdisciplinary team and initiate plans and interventions as ordered  - Encourage independent activity per ability   - Maintain proper body alignment  - Perform active/passive rom as tolerated/ordered  - Plan activities to conserve energy   - Turn patient as appropriate  Outcome: Progressing     Problem: Communication Impairment  Goal: Ability to express needs and understand communication  Description: Assess patient's communication skills and ability to understand information  Patient will demonstrate use of effective communication techniques, alternative methods of communication and understanding even if not able to speak  - Encourage communication and provide alternate methods of communication as needed  - Collaborate with case management/ for discharge needs  - Include patient/family/caregiver in decisions related to communication  Outcome: Progressing     Problem: Potential for Aspiration  Goal: Non-ventilated patient's risk of aspiration is minimized  Description: Assess and monitor vital signs, respiratory status, and labs (WBC)  Monitor for signs of aspiration (tachypnea, cough, rales, wheezing, cyanosis, fever)  - Assess and monitor patient's ability to swallow  - Place patient up in chair to eat if possible  - HOB up at 90 degrees to eat if unable to get patient up into chair   - Supervise patient during oral intake  - Instruct patient/ family to take small bites  - Instruct patient/ family to take small single sips when taking liquids    - Follow patient-specific strategies generated by speech pathologist   Outcome: Progressing     Problem: Nutrition  Goal: Nutrition/Hydration status is improving  Description: Monitor and assess patient's nutrition/hydration status for malnutrition (ex- brittle hair, bruises, dry skin, pale skin and conjunctiva, muscle wasting, smooth red tongue, and disorientation)  Collaborate with interdisciplinary team and initiate plan and interventions as ordered  Monitor patient's weight and dietary intake as ordered or per policy  Utilize nutrition screening tool and intervene per policy  Determine patient's food preferences and provide high-protein, high-caloric foods as appropriate  - Assist patient with eating   - Allow adequate time for meals   - Encourage patient to take dietary supplement as ordered  - Collaborate with clinical nutritionist   - Include patient/family/caregiver in decisions related to nutrition  Outcome: Progressing     Problem: Potential for Falls  Goal: Patient will remain free of falls  Description: INTERVENTIONS:  - Assess patient frequently for physical needs  -  Identify cognitive and physical deficits and behaviors that affect risk of falls    -  [place] fall precautions as indicated by assessment   - Educate patient/family on patient safety including physical limitations  - Instruct patient to call for assistance with activity based on assessment  - Modify environment to reduce risk of injury  - Consider OT/PT consult to assist with strengthening/mobility  Outcome: Progressing

## 2021-02-18 NOTE — ASSESSMENT & PLAN NOTE
· Creatinine 3 08 2/12/21, up from 1 91 the day prior, and baseline appears to be around 1 0  · Suspected secondary to ATN, bladder ca hydroureteronephrosis, contrast induced  · Ultrasound showed mild-to-moderate bilateral hydronephrosis, worse on the left  · CT abdomen pelvis without contrast showed moderate left hydroureteronephrosis, likely due to patient's bladder cancer since there is eccentric, partially calcified left-sided urinary bladder wall thickening  There is no right-sided hydronephrosis  1 7 cm diameter lytic metastases in T12 vertebral body, liver metastasis, right lower lobe pulmonary nodules, retroperitoneal adenopathy  · Discussed with nephrology, Urology and IR  · S/p  L PCN 2/16  · Nephrology discussed with Graciela Smith recommended hospice and discussed that with the patient's family  Patient was made comfort care, will transition to home hospice on Saturday  Solu-Medrol 60 mg daily and then transitioned to 60 mg daily of prednisone on discharge

## 2021-02-18 NOTE — ASSESSMENT & PLAN NOTE
Hemoglobin was stable on last labs stable  Per patient and family no more lab draws, patient is comfort care

## 2021-02-18 NOTE — ASSESSMENT & PLAN NOTE
· Known history   Patient was on Xarelto   · Xarelto was held on admission and patient was initially placed on heparin GTT then transitioned to Lovenox    Discontinue anticoagulation, patient is comfort care

## 2021-02-18 NOTE — ASSESSMENT & PLAN NOTE
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

## 2021-02-18 NOTE — PROGRESS NOTES
Progress Note - Michael Peguero 1935, 81 y o  male MRN: [id number]    Unit/Bed#: [facility] 904-01 Encounter: [id number]    Primary Care Provider: Judy Crane MD   Date and time admitted to hospital: 2/10/2021  1:55 PM        * CVA (cerebral vascular accident) [facility])  Assessment & Plan  · Per record review, on 2/10/21, patient was noted to be expressively aphasic, last known well at noon that day, with aphasia noted right after a fall  · Did not receive tPA due to Xarelto use  · Initial CT head with microangiopathic changes, no acute intracranial abnormality  CTA head/neck with left distal M2 and M3 occlusion however no occlusion was noted by Neurosurgeon per Neurology records  Repeat CT head from 2/10/21 due to worsening aphasia with no evidence of acute vascular territorial infarction, intracranial hemorrhage or mass effect  · MRI brain revealed cluster of acute to early subacute embolic infarcts in the left MCA territory, within the inferior parietal temporal region  Few scattered embolic infarcts in the frontoparietal region and right cerebellar hemisphere  Possible central/cardiac source  · Per Neurology on 2/12/21 the patient's heparin GTT was discontinued and he was switched to Lovenox     Patient was made level 4 comfort care  Discharge to home hospice on Saturday      VELVET (acute kidney injury) [facility])  Assessment & Plan  · Creatinine 3 08 2/12/21, up from 1 91 the day prior, and baseline appears to be around 1 0  · Suspected secondary to ATN, bladder ca hydroureteronephrosis, contrast induced  · Ultrasound showed mild-to-moderate bilateral hydronephrosis, worse on the left  · CT abdomen pelvis without contrast showed moderate left hydroureteronephrosis, likely due to patient's bladder cancer since there is eccentric, partially calcified left-sided urinary bladder wall thickening  There is no right-sided hydronephrosis    1 7 cm diameter lytic metastases in T12 vertebral body, liver metastasis, right lower lobe pulmonary nodules, retroperitoneal adenopathy  · Discussed with nephrology, Urology and IR  · S/p  L PCN 2/16  · Nephrology discussed with [facility]  Dr Matthieu Rollins recommended hospice and discussed that with the patient's family  Patient was made comfort care, will transition to home hospice on Saturday  Solu-Medrol 60 mg daily and then transitioned to 60 mg daily of prednisone on discharge  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 [facility])  Assessment & Plan  · Patient with known urothelial carcinoma, muscle invasive, 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, liver, T12  Follows at [facility]      Pulmonary emboli [facility])  Assessment & Plan  · Known history  Patient was on Xarelto   · Xarelto was held on admission and patient was initially placed on heparin GTT then transitioned to Lovenox    Discontinue anticoagulation, patient is comfort care    Anemia  Assessment & Plan  Hemoglobin was stable on last labs stable  Per patient and family no more lab draws, patient is comfort care     Fall  Assessment & Plan  · Appreciate trauma input, they have since signed off  · Fall precautions         VTE Pharmacologic Prophylaxis:   Pharmacologic: None, patient is comfort care  Mechanical VTE Prophylaxis in Place: No    Patient Centered Rounds: I have performed bedside rounds with nursing staff today  Discussions with Specialists or Other Care Team Provider:  Nursing, case management    Education and Discussions with Family / Patient:  Patient  Family will be in later today    Time Spent for Care: 30 minutes  More than 50% of total time spent on counseling and coordination of care as described above      Current Length of Stay: 8 day(s)    Current Patient Status: Inpatient   Certification Statement: The patient will continue to require additional inpatient hospital stay due to Home hospice needs to be arranged    Discharge Plan:  Discharge to home hospice on Saturday    Code Status: Level 4 - Comfort Care      Subjective:   Patient was seen and evaluated bedside  He is comfortable  No overnight events  Objective:     Vitals:   Temp (24hrs), Av °F (36 7 °C), Min:97 8 °F (36 6 °C), Max:98 1 °F (36 7 °C)    Temp:  [97 8 °F (36 6 °C)-98 1 °F (36 7 °C)] 97 8 °F (36 6 °C)  HR:  [102] 102  Resp:  [18-20] 18  BP: (130-164)/(88-98) 130/88  SpO2:  [94 %] 94 %  Body mass index is 24 17 kg/m²  Input and Output Summary (last 24 hours): Intake/Output Summary (Last 24 hours) at 2021 0845  Last data filed at 2021 0231  Gross per 24 hour   Intake 120 ml   Output 1025 ml   Net -905 ml       Physical Exam:     Physical Exam  Constitutional:       General: He is not in acute distress  Appearance: He is ill-appearing  Comments: Expressive aphasia   Neck:      Musculoskeletal: Neck supple  Cardiovascular:      Rate and Rhythm: Normal rate and regular rhythm  Heart sounds: No murmur  No friction rub  No gallop  Pulmonary:      Effort: Pulmonary effort is normal  No respiratory distress  Breath sounds: Normal breath sounds  No wheezing  Abdominal:      General: Bowel sounds are normal  There is no distension  Palpations: Abdomen is soft  Tenderness: There is no abdominal tenderness  Comments: Left PCN, blood-tinged   Musculoskeletal:         General: No swelling  Skin:     General: Skin is warm and dry  Neurological:      Mental Status: He is alert        Comments: Moving around all 4 extremities, expressive aphasia   Psychiatric:      Comments: Unable to assess         Additional Data:     Labs:    Results from last 7 days   Lab Units 21  0513   WBC Thousand/uL 10 72*   HEMOGLOBIN g/dL 11 0*   HEMATOCRIT % 33 3*   PLATELETS Thousands/uL 211   NEUTROS PCT % 78*   LYMPHS PCT % 11*   MONOS PCT % 9   EOS PCT % 1     Results from last 7 days   Lab Units 21  0513 21  0612   SODIUM mmol/L 139 138   POTASSIUM mmol/L 5 1 5 1   CHLORIDE mmol/L 100 101 CO2 mmol/L 21 20*   BUN mg/dL 102* 96*   CREATININE mg/dL 9 00* 7 97*   ANION GAP mmol/L 18* 17*   CALCIUM mg/dL 10 3* 10 0   ALBUMIN g/dL  --  2 6*   TOTAL BILIRUBIN mg/dL  --  1 12*   ALK PHOS U/L  --  76   ALT U/L  --  57   AST U/L  --  100*   GLUCOSE RANDOM mg/dL 96 89         Results from last 7 days   Lab Units 02/18/21  0738 02/17/21  2126 02/17/21  1625 02/17/21  1052 02/17/21  0741 02/16/21  2050 02/16/21  1631 02/16/21  1046 02/16/21  0742 02/15/21  2101 02/15/21  1655 02/15/21  1155   POC GLUCOSE mg/dl 109 168* 234* 127 87 97 97 106 93 90 104 106                   * I Have Reviewed All Lab Data Listed Above  * Additional Pertinent Lab Tests Reviewed: Rubia 66 Admission Reviewed    Imaging:    Imaging Reports Reviewed Today Include: all  Imaging Personally Reviewed by Myself Includes:      Recent Cultures (last 7 days):     Results from last 7 days   Lab Units 02/16/21  1729   URINE CULTURE  No Growth <1000 cfu/mL       Last 24 Hours Medication List:   Current Facility-Administered Medications   Medication Dose Route Frequency Provider Last Rate    acetaminophen  650 mg Oral Q4H PRN Krysten Adorno MD      aluminum-magnesium hydroxide-simethicone  30 mL Oral Q6H PRN Krysten Adorno MD      insulin lispro  1-5 Units Subcutaneous TID AC Krysten Adorno MD      methylPREDNISolone sodium succinate  60 mg Intravenous Daily Buddy Gibson MD      ondansetron  4 mg Intravenous Q6H PRN Krysten Adorno MD      sodium bicarbonate infusion  60 mL/hr Intravenous Continuous Buddy Gibson MD 60 mL/hr (02/17/21 4588)        Today, Patient Was Seen By: Doreen Moore MD    ** Please Note: Dictation voice to text software may have been used in the creation of this document   **

## 2021-02-18 NOTE — ASSESSMENT & PLAN NOTE
· Per record review, on 2/10/21, patient was noted to be expressively aphasic, last known well at noon that day, with aphasia noted right after a fall  · Did not receive tPA due to Xarelto use  · Initial CT head with microangiopathic changes, no acute intracranial abnormality  CTA head/neck with left distal M2 and M3 occlusion however no occlusion was noted by Neurosurgeon per Neurology records  Repeat CT head from 2/10/21 due to worsening aphasia with no evidence of acute vascular territorial infarction, intracranial hemorrhage or mass effect  · MRI brain revealed cluster of acute to early subacute embolic infarcts in the left MCA territory, within the inferior parietal temporal region  Few scattered embolic infarcts in the frontoparietal region and right cerebellar hemisphere  Possible central/cardiac source  · Per Neurology on 2/12/21 the patient's heparin GTT was discontinued and he was switched to Lovenox     Patient was made level 4 comfort care    Discharge to home hospice on Saturday

## 2021-02-19 LAB — GLUCOSE SERPL-MCNC: 118 MG/DL (ref 65–140)

## 2021-02-19 PROCEDURE — 82948 REAGENT STRIP/BLOOD GLUCOSE: CPT

## 2021-02-19 PROCEDURE — 99232 SBSQ HOSP IP/OBS MODERATE 35: CPT | Performed by: INTERNAL MEDICINE

## 2021-02-19 RX ORDER — LORAZEPAM 0.5 MG/1
0.5 TABLET ORAL EVERY 6 HOURS PRN
Qty: 20 TABLET | Refills: 0 | Status: SHIPPED | OUTPATIENT
Start: 2021-02-19

## 2021-02-19 RX ORDER — PREDNISONE 20 MG/1
60 TABLET ORAL DAILY
Qty: 60 TABLET | Refills: 0 | Status: SHIPPED | OUTPATIENT
Start: 2021-02-19 | End: 2021-03-11

## 2021-02-19 RX ORDER — OXYCODONE HYDROCHLORIDE 5 MG/1
5 TABLET ORAL EVERY 4 HOURS PRN
Qty: 30 TABLET | Refills: 0 | Status: SHIPPED | OUTPATIENT
Start: 2021-02-19 | End: 2021-02-24 | Stop reason: DRUGHIGH

## 2021-02-19 RX ORDER — HALOPERIDOL 2 MG/ML
1 SOLUTION ORAL EVERY 6 HOURS PRN
Qty: 120 ML | Refills: 0 | Status: SHIPPED | OUTPATIENT
Start: 2021-02-19

## 2021-02-19 NOTE — PROGRESS NOTES
Progress Note - Nelly Tuttle 1935, 81 y o  male MRN: [id number]    Unit/Bed#: [facility] 904-01 Encounter: [id number]    Primary Care Provider: Silvia Cheek MD   Date and time admitted to hospital: 2/10/2021  1:55 PM        * CVA (cerebral vascular accident) [facility])  Assessment & Plan  · Per record review, on 2/10/21, patient was noted to be expressively aphasic, last known well at noon that day, with aphasia noted right after a fall  · Did not receive tPA due to Xarelto use  · Initial CT head with microangiopathic changes, no acute intracranial abnormality  CTA head/neck with left distal M2 and M3 occlusion however no occlusion was noted by Neurosurgeon per Neurology records  Repeat CT head from 2/10/21 due to worsening aphasia with no evidence of acute vascular territorial infarction, intracranial hemorrhage or mass effect  · MRI brain revealed cluster of acute to early subacute embolic infarcts in the left MCA territory, within the inferior parietal temporal region  Few scattered embolic infarcts in the frontoparietal region and right cerebellar hemisphere  Possible central/cardiac source  · Per Neurology on 2/12/21 the patient's heparin GTT was discontinued and he was switched to Lovenox     Patient was made level 4 comfort care  Discharge to home hospice on Saturday      VELVET (acute kidney injury) [facility])  Assessment & Plan  · Creatinine 3 08 2/12/21, up from 1 91 the day prior, and baseline appears to be around 1 0  · Suspected secondary to ATN, bladder ca hydroureteronephrosis, contrast induced  · Ultrasound showed mild-to-moderate bilateral hydronephrosis, worse on the left  · CT abdomen pelvis without contrast showed moderate left hydroureteronephrosis, likely due to patient's bladder cancer since there is eccentric, partially calcified left-sided urinary bladder wall thickening  There is no right-sided hydronephrosis    1 7 cm diameter lytic metastases in T12 vertebral body, liver metastasis, right lower lobe pulmonary nodules, retroperitoneal adenopathy  · Discussed with nephrology, Urology and IR  · S/p  L PCN 2/16  · Nephrology discussed with [facility] Brett Cotton recommended hospice and discussed that with the patient's family  Patient was made comfort care, will transition to home hospice on Saturday  Solu-Medrol 60 mg daily and then transitioned to 60 mg daily of prednisone on discharge  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 [facility])  Assessment & Plan  · Patient with known urothelial carcinoma, muscle invasive, 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, liver, T12  [facility] at [facility]      Pulmonary emboli [facility])  Assessment & Plan  · Known history  Patient was on Xarelto   · Xarelto was held on admission and patient was initially placed on heparin GTT then transitioned to Lovenox    Discontinue anticoagulation, patient is comfort care    Anemia  Assessment & Plan  Hemoglobin was stable on last labs stable  Per patient and family no more lab draws, patient is comfort care     Fall  Assessment & Plan  · Appreciate trauma input, they have since signed off  · Fall precautions         VTE Pharmacologic Prophylaxis:   Pharmacologic: None patient is comfort care  Mechanical VTE Prophylaxis in Place: Yes    Patient Centered Rounds: I have performed bedside rounds with nursing staff today  Discussions with Specialists or Other Care Team Provider:  Nursing, case management    Education and Discussions with Family / Patient:  Family will be in later    Time Spent for Care: 25 minutes  More than 50% of total time spent on counseling and coordination of care as described above      Current Length of Stay: 9 day(s)    Current Patient Status: Inpatient   Certification Statement: The patient will continue to require additional inpatient hospital stay due to Awaiting home hospice to be arranged    Discharge Plan:  Discharge tomorrow to home hospice    Code Status: Level 4 - Comfort Care      Subjective: Patient was seen evaluated bedside, no overnight events, patient comfortable    Objective:     Vitals:   Temp (24hrs), Av 5 °F (36 4 °C), Min:97 5 °F (36 4 °C), Max:97 5 °F (36 4 °C)    Temp:  [97 5 °F (36 4 °C)] 97 5 °F (36 4 °C)  Resp:  [18] 18  BP: (135)/(87) 135/87  Body mass index is 24 17 kg/m²  Input and Output Summary (last 24 hours): Intake/Output Summary (Last 24 hours) at 2021 0845  Last data filed at 2021 0301  Gross per 24 hour   Intake --   Output 2150 ml   Net -2150 ml       Physical Exam:     Physical Exam  Constitutional:       General: He is not in acute distress  Appearance: He is ill-appearing  HENT:      Head: Atraumatic  Neck:      Musculoskeletal: Neck supple  Cardiovascular:      Rate and Rhythm: Normal rate and regular rhythm  Heart sounds: No murmur  No friction rub  No gallop  Pulmonary:      Effort: Pulmonary effort is normal  No respiratory distress  Breath sounds: Normal breath sounds  No wheezing  Abdominal:      General: Bowel sounds are normal  There is no distension  Palpations: Abdomen is soft  Tenderness: There is no abdominal tenderness  Comments: L PCN   Musculoskeletal:         General: No swelling  Skin:     General: Skin is warm and dry  Neurological:      Mental Status: He is alert        Comments: Expressive aphasia, able to move all 4 extremities, awake and alert   Psychiatric:         Mood and Affect: Mood normal          Additional Data:     Labs:    Results from last 7 days   Lab Units 21  0513   WBC Thousand/uL 10 72*   HEMOGLOBIN g/dL 11 0*   HEMATOCRIT % 33 3*   PLATELETS Thousands/uL 211   NEUTROS PCT % 78*   LYMPHS PCT % 11*   MONOS PCT % 9   EOS PCT % 1     Results from last 7 days   Lab Units 21  0513 21  0612   SODIUM mmol/L 139 138   POTASSIUM mmol/L 5 1 5 1   CHLORIDE mmol/L 100 101   CO2 mmol/L 21 20*   BUN mg/dL 102* 96*   CREATININE mg/dL 9 00* 7 97*   ANION GAP mmol/L 18* 17*   CALCIUM mg/dL 10 3* 10 0   ALBUMIN g/dL  --  2 6*   TOTAL BILIRUBIN mg/dL  --  1 12*   ALK PHOS U/L  --  76   ALT U/L  --  57   AST U/L  --  100*   GLUCOSE RANDOM mg/dL 96 89         Results from last 7 days   Lab Units 02/19/21  0814 02/18/21  2110 02/18/21  1647 02/18/21  1159 02/18/21  0738 02/17/21  2126 02/17/21  1625 02/17/21  1052 02/17/21  0741 02/16/21  2050 02/16/21  1631 02/16/21  1046   POC GLUCOSE mg/dl 118 161* 179* 167* 109 168* 234* 127 87 97 97 106                   * I Have Reviewed All Lab Data Listed Above  * Additional Pertinent Lab Tests Reviewed: Rubia 66 Admission Reviewed    Imaging:    Imaging Reports Reviewed Today Include: all  Imaging Personally Reviewed by Myself Includes:      Recent Cultures (last 7 days):     Results from last 7 days   Lab Units 02/16/21  1729   URINE CULTURE  No Growth <1000 cfu/mL       Last 24 Hours Medication List:   Current Facility-Administered Medications   Medication Dose Route Frequency Provider Last Rate    acetaminophen  650 mg Oral Q4H PRN Ilir Powell MD      aluminum-magnesium hydroxide-simethicone  30 mL Oral Q6H PRN Ilir Powell MD      insulin lispro  1-5 Units Subcutaneous TID AC Ilir Powell MD      methylPREDNISolone sodium succinate  60 mg Intravenous Daily Shelton Fuller MD      ondansetron  4 mg Intravenous Q6H PRN Ilir Powell MD      sodium bicarbonate infusion  60 mL/hr Intravenous Continuous Shelton Fuller MD 60 mL/hr (02/18/21 1719)        Today, Patient Was Seen By: Roland Gaytan MD    ** Please Note: Dictation voice to text software may have been used in the creation of this document   **

## 2021-02-19 NOTE — PROGRESS NOTES
made introductory support visit for Collin Shore on comfort care, and met him with his son Hermila Medrano and wife Dawson at bedside   was unable to coherently communicate with Collin Shore and he seemed to be waving  in and then off, so  left family to spend time alone with assurance and availablabity of our team as needed

## 2021-02-19 NOTE — ASSESSMENT & PLAN NOTE
· Creatinine 3 08 2/12/21, up from 1 91 the day prior, and baseline appears to be around 1 0  · Suspected secondary to ATN, bladder ca hydroureteronephrosis, contrast induced  · Ultrasound showed mild-to-moderate bilateral hydronephrosis, worse on the left  · CT abdomen pelvis without contrast showed moderate left hydroureteronephrosis, likely due to patient's bladder cancer since there is eccentric, partially calcified left-sided urinary bladder wall thickening  There is no right-sided hydronephrosis  1 7 cm diameter lytic metastases in T12 vertebral body, liver metastasis, right lower lobe pulmonary nodules, retroperitoneal adenopathy  · Discussed with nephrology, Urology and IR  · S/p  L PCN 2/16  · Nephrology discussed with [facility] [place]  Dr Praveena Lewis recommended hospice and discussed that with the patient's family  Patient was made comfort care, will transition to home hospice on Saturday  Solu-Medrol 60 mg daily and then transitioned to 60 mg daily of prednisone on discharge

## 2021-02-19 NOTE — UTILIZATION REVIEW
Continued Stay Review    Date: 2/16/21                         Current Patient Class: IP Current Level of Care: MS    HPI:86 y o  male initially admitted on  2/10 with expressive aphasia, word finding difficulty after a fall  CVA noted on MRI  VELVET  Assessment/Plan:   Heparin drip was d/c and pt was put on Lovenox daily  He continues on bicarb drip  No immediate indication for CVVH but he may need it if kidney function does not improve  Lasix drip was d/c on 216  Pt has worsening kidney injury, CT scan was performed showing tumor burden blocking the left kidney, no right hydronephrosis is noted, left hydronephrosis is seen  Had an 8 5 cm Fr L nephrostomy tube placed on 2/16  Pt receiving ST for ongoing  mild receptive, mod expressive aphasia w/ difficulty naming & noted paraphasic errors w/ attempts to complete tasks today  He has difficulty w/ increasingly complex language tasks & needs simple cues/commands  Pertinent Labs/Diagnostic Results:     2/12 MRI brain - Cluster of acute to early subacute embolic infarcts in the left MCA territory, within the inferior parietal temporal region   Few scattered embolic infarcts in the right frontoparietal region and right cerebellar hemisphere        Results from last 7 days   Lab Units 02/16/21  0612 02/15/21  0523 02/14/21  0453 02/13/21  0952   WBC Thousand/uL 9 32 9 94 7 62 10 01   HEMOGLOBIN g/dL 10 6* 10 5* 9 2* 11 4*   HEMATOCRIT % 32 1* 31 8* 28 3* 34 7*   PLATELETS Thousands/uL 213 212 176 200   NEUTROS ABS Thousands/µL 7 43  --   --   --          Results from last 7 days   Lab Units 02/16/21  0612 02/15/21  0523 02/14/21 0453 02/13/21  0952   SODIUM mmol/L 138 138 139 138   POTASSIUM mmol/L 5 1 4 9 4 6 4 2   CHLORIDE mmol/L 101 102 102 104   CO2 mmol/L 20* 24 21 23   ANION GAP mmol/L 17* 12 16* 11   BUN mg/dL 96* 84* 64* 62*   CREATININE mg/dL 7 97* 6 70* 4 45* 4 21*   EGFR ml/min/1 73sq m 6 7 11 12   CALCIUM mg/dL 10 0 10 2* 8 6 10 3*   CALCIUM, IONIZED mmol/L 1 19  --   --   --    MAGNESIUM mg/dL 3 0*  --   --   --    PHOSPHORUS mg/dL 7 0*  --   --   --      Results from last 7 days   Lab Units 02/16/21  0612   AST U/L 100*   ALT U/L 57   ALK PHOS U/L 76   TOTAL PROTEIN g/dL 6 0*  5 8*   ALBUMIN g/dL 2 6*   TOTAL BILIRUBIN mg/dL 1 12*     Results from last 7 days   Lab Units 02/16/21  2050 02/16/21  1631 02/16/21  1046   POC GLUCOSE mg/dl 97 97 106     Results from last 7 days   Lab Units 02/16/21  0612 02/15/21  0523 02/14/21  0453 02/13/21  0952   GLUCOSE RANDOM mg/dL 89 95 74 127     Results from last 7 days   Lab Units 02/16/21  0612   CK TOTAL U/L 69     Results from last 7 days   Lab Units 02/12/21  1648   PTT seconds 40*     Results from last 7 days   Lab Units 02/16/21  0612   FERRITIN ng/mL 1,508*     Results from last 7 days   Lab Units 02/15/21  0948   CLARITY UA  Clear   COLOR UA  Yellow   SPEC GRAV UA  1 008   PH UA  7 0   GLUCOSE UA mg/dl Negative   KETONES UA mg/dl Negative   BLOOD UA  Moderate*   PROTEIN UA mg/dl 100 (2+)*   NITRITE UA  Negative   BILIRUBIN UA  Negative   UROBILINOGEN UA E U /dl 0 2   LEUKOCYTES UA  Negative   WBC UA /hpf None Seen   RBC UA /hpf None Seen   BACTERIA UA /hpf None Seen   EPITHELIAL CELLS WET PREP /hpf None Seen     Results from last 7 days   Lab Units 02/16/21  1729   URINE CULTURE  No Growth <1000 cfu/mL     Vital Signs:     02/16/21   22:33:49  02/16/21   19:59:33  02/16/21   1920  02/16/21   1905  02/16/21   1850   Vital Signs   Temp  98 6 °F (37 °C)  98 2 °F (36 8  °C)  --  --  --     DI  DI         Pulse  104  90  --  --  --     KO  KO         Resp  18  18  --  --  --     DI  KO         BP  150/88  144/81  142/70  138/72  149/86     DI  DI  KO  KO  KO   MAP (mmHg)  109  102  --  --  --       02/16/21   1835 02/16/21   1743   02/16/21   1738  02/16/21   1733    Vital Signs   Pulse  -- --  105  102  101        AR  AR  AR   Resp  -- --  16  17  13        AR  AR  AR   BP  161/103Abnormal  161/104Abnormal 166/84  148/84  156/88     KO DI  AR  AR  AR   MAP (mmHg)  -- 123  117  108  115           02/16/21   1706 02/16/21   16:10:38 02/16/21   10:30:35  02/16/21   06:14:20  02/16/21   02:46:16    Vital Signs   Temp  -- 97 6 °F (36 4  °C)  98 1 °F (36 7  °C)  98 °F (36 7 °C)  98 °F (36 7 °C)      DI  DI  DI  DI   Pulse  105 103  96  96  --     AR KO  SR  SR     Resp  18 18  18  18  20     AR KO  DI  DI  DI   BP  175/93Abnormal  161/84  160/85  161/86  161/87     AR DI  DI  DI  DI   MAP (mmHg)  126 110  110  111  112     Medications:   Scheduled Medications:  insulin lispro, 1-5 Units, Subcutaneous, TID AC    Continuous IV Infusions:  sodium bicarbonate infusion, 60 mL/hr, Intravenous, Continuous  IV Lasix infusion  Stopped at 0901 2/16  IV 0 9% NSS @ 60 ml  Stopped 0901 2/16    PRN Meds:  acetaminophen, 650 mg, Oral, Q4H PRN  aluminum-magnesium hydroxide-simethicone, 30 mL, Oral, Q6H PRN  ondansetron, 4 mg, Intravenous, Q6H PRN    Discharge Plan: [address] 2/20    Network Utilization Review Department  ATTENTION: Please call with any questions or concerns to [phone] and carefully listen to the prompts so that you are directed to the right person  All voicemails are confidential   Svetlana Dwayne all requests for admission clinical reviews, approved or denied determinations and any other requests to dedicated fax number below belonging to the campus where the patient is receiving treatment   List of dedicated fax numbers for the Facilities:  [address] DENIALS (Administrative/Medical Necessity) [phone]   [address] (Maternity/NICU/Pediatrics) [phone]   [address] Judie Montanez 6098 (Varun Sinha "Michelle" 103) [address] [facility] [phone] [address] [place] [address] Duy Gavin Garcia 1481 [phone]   [address] 951 [phone]

## 2021-02-19 NOTE — PLAN OF CARE
Problem: Prexisting or High Potential for Compromised Skin Integrity  Goal: Skin integrity is maintained or improved  Description: INTERVENTIONS:  - Identify patients at risk for skin breakdown  - Assess and monitor skin integrity  - Assess and monitor nutrition and hydration status  - Monitor labs   - Assess for incontinence   - Turn and reposition patient  - Assist with mobility/ambulation  - Relieve pressure over bony prominences  - Avoid friction and shearing  - Provide appropriate hygiene as needed including keeping skin clean and dry  - Evaluate need for skin moisturizer/barrier cream  - Collaborate with interdisciplinary team   - Patient/family teaching  - Consider wound care consult   Outcome: Progressing     Problem: PAIN - ADULT  Goal: Verbalizes/displays adequate comfort level or baseline comfort level  Description: Interventions:  - Encourage patient to monitor pain and request assistance  - Assess pain using appropriate pain scale  - Administer analgesics based on type and severity of pain and evaluate response  - Implement non-pharmacological measures as appropriate and evaluate response  - Consider cultural and social influences on pain and pain management  - Notify physician/advanced practitioner if interventions unsuccessful or patient reports new pain  Outcome: Progressing     Problem: INFECTION - ADULT  Goal: Absence or prevention of progression during hospitalization  Description: INTERVENTIONS:  - Assess and monitor for signs and symptoms of infection  - Monitor lab/diagnostic results  - Monitor all insertion sites, i e  indwelling lines, tubes, and drains  - Monitor endotracheal if appropriate and nasal secretions for changes in amount and color  - [place] appropriate cooling/warming therapies per order  - Administer medications as ordered  - Instruct and encourage patient and family to use good hand hygiene technique  - Identify and instruct in appropriate isolation precautions for identified infection/condition  Outcome: Progressing  Goal: Absence of fever/infection during neutropenic period  Description: INTERVENTIONS:  - Monitor WBC    Outcome: Progressing     Problem: SAFETY ADULT  Goal: Patient will remain free of falls  Description: INTERVENTIONS:  - Assess patient frequently for physical needs  -  Identify cognitive and physical deficits and behaviors that affect risk of falls    -  Aldie fall precautions as indicated by assessment   - Educate patient/family on patient safety including physical limitations  - Instruct patient to call for assistance with activity based on assessment  - Modify environment to reduce risk of injury  - Consider OT/PT consult to assist with strengthening/mobility  Outcome: Progressing  Goal: Maintain or return to baseline ADL function  Description: INTERVENTIONS:  -  Assess patient's ability to carry out ADLs; assess patient's baseline for ADL function and identify physical deficits which impact ability to perform ADLs (bathing, care of mouth/teeth, toileting, grooming, dressing, etc )  - Assess/evaluate cause of self-care deficits   - Assess range of motion  - Assess patient's mobility; develop plan if impaired  - Assess patient's need for assistive devices and provide as appropriate  - Encourage maximum independence but intervene and supervise when necessary  - Involve family in performance of ADLs  - Assess for home care needs following discharge   - Consider OT consult to assist with ADL evaluation and planning for discharge  - Provide patient education as appropriate  Outcome: Progressing  Goal: Maintain or return mobility status to optimal level  Description: INTERVENTIONS:  - Assess patient's baseline mobility status (ambulation, transfers, stairs, etc )    - Identify cognitive and physical deficits and behaviors that affect mobility  - Identify mobility aids required to assist with transfers and/or ambulation (gait belt, sit-to-stand, lift, walker, cane, etc )  - [place] fall precautions as indicated by assessment  - Record patient progress and toleration of activity level on Mobility SBAR; progress patient to next Phase/Stage  - Instruct patient to call for assistance with activity based on assessment  - Consider rehabilitation consult to assist with strengthening/weightbearing, etc   Outcome: Progressing     Problem: DISCHARGE PLANNING  Goal: Discharge to home or other facility with appropriate resources  Description: INTERVENTIONS:  - Identify barriers to discharge w/patient and caregiver  - Arrange for needed discharge resources and transportation as appropriate  - Identify discharge learning needs (meds, wound care, etc )  - Arrange for interpretive services to assist at discharge as needed  - Refer to Case Management Department for coordinating discharge planning if the patient needs post-hospital services based on physician/advanced practitioner order or complex needs related to functional status, cognitive ability, or social support system  Outcome: Progressing     Problem: Knowledge Deficit  Goal: Patient/family/caregiver demonstrates understanding of disease process, treatment plan, medications, and discharge instructions  Description: Complete learning assessment and assess knowledge base  Interventions:  - Provide teaching at level of understanding  - Provide teaching via preferred learning methods  Outcome: Progressing     Problem: Neurological Deficit  Goal: Neurological status is stable or improving  Description: Interventions:  - Monitor and assess patient's level of consciousness, motor function, sensory function, and level of assistance needed for ADLs  - Monitor and report changes from baseline  Collaborate with interdisciplinary team to initiate plan and implement interventions as ordered  - Provide and maintain a safe environment  - Consider seizure precautions  - Consider fall precautions  - Consider aspiration precautions    - Consider bleeding precautions  Outcome: Progressing     Problem: Activity Intolerance/Impaired Mobility  Goal: Mobility/activity is maintained at optimum level for patient  Description: Interventions:  - Assess and monitor patient  barriers to mobility and need for assistive/adaptive devices  - Assess patient's emotional response to limitations  - Collaborate with interdisciplinary team and initiate plans and interventions as ordered  - Encourage independent activity per ability   - Maintain proper body alignment  - Perform active/passive rom as tolerated/ordered  - Plan activities to conserve energy   - Turn patient as appropriate  Outcome: Progressing     Problem: Communication Impairment  Goal: Ability to express needs and understand communication  Description: Assess patient's communication skills and ability to understand information  Patient will demonstrate use of effective communication techniques, alternative methods of communication and understanding even if not able to speak  - Encourage communication and provide alternate methods of communication as needed  - Collaborate with case management/ for discharge needs  - Include patient/family/caregiver in decisions related to communication  Outcome: Progressing     Problem: Potential for Aspiration  Goal: Non-ventilated patient's risk of aspiration is minimized  Description: Assess and monitor vital signs, respiratory status, and labs (WBC)  Monitor for signs of aspiration (tachypnea, cough, rales, wheezing, cyanosis, fever)  - Assess and monitor patient's ability to swallow  - Place patient up in chair to eat if possible  - HOB up at 90 degrees to eat if unable to get patient up into chair   - Supervise patient during oral intake  - Instruct patient/ family to take small bites  - Instruct patient/ family to take small single sips when taking liquids    - Follow patient-specific strategies generated by speech pathologist   Outcome: Progressing     Problem: Nutrition  Goal: Nutrition/Hydration status is improving  Description: Monitor and assess patient's nutrition/hydration status for malnutrition (ex- brittle hair, bruises, dry skin, pale skin and conjunctiva, muscle wasting, smooth red tongue, and disorientation)  Collaborate with interdisciplinary team and initiate plan and interventions as ordered  Monitor patient's weight and dietary intake as ordered or per policy  Utilize nutrition screening tool and intervene per policy  Determine patient's food preferences and provide high-protein, high-caloric foods as appropriate  - Assist patient with eating   - Allow adequate time for meals   - Encourage patient to take dietary supplement as ordered  - Collaborate with clinical nutritionist   - Include patient/family/caregiver in decisions related to nutrition  Outcome: Progressing     Problem: Potential for Falls  Goal: Patient will remain free of falls  Description: INTERVENTIONS:  - Assess patient frequently for physical needs  -  Identify cognitive and physical deficits and behaviors that affect risk of falls    -  [place] fall precautions as indicated by assessment   - Educate patient/family on patient safety including physical limitations  - Instruct patient to call for assistance with activity based on assessment  - Modify environment to reduce risk of injury  - Consider OT/PT consult to assist with strengthening/mobility  Outcome: Progressing

## 2021-02-19 NOTE — CASE MANAGEMENT
PT for DC in AM with [address], they can transport at 11:30    CMN form completed, copy to medical records    vm left for wife to return call    11A-spoke with wife, aware meds are at [facility] and can be picked up when she comes in  Advised she can ask for me when she gets here and I can go down with her  IMM reviewed with wife  wife agree with discharge determination  Wife aware of transport time of 11:30 tomorrow    Provided non skilled caregiver list to son and explained    Confirmed with Felicia, hospice liaison, equipment was delivered to patients home

## 2021-02-19 NOTE — ASSESSMENT & PLAN NOTE
Findings were discussed with patient. She will need to be scheduled for a colposcopy. 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

## 2021-02-19 NOTE — TRANSPORTATION MEDICAL NECESSITY
Section I - General Information    Name of Patient: Keira Jeffrey                 : 1935    Medicare #: [id number]  Transport Date: 2021 (PCS is valid for round trips on this date and for all repetitive trips in the 60-day range as noted below )  Origin: [address] [facility]                                                         Destination: [address] [place], [place] [address], [place], [place] the pt's stay covered under Medicare Part A (PPS/DRG)   []     Closest appropriate facility? If no, why is transport to more distant facility required? No  If no, explain: patients home  If hospice pt, is this transport related to pt's terminal illness? NA       Section II - Medical Necessity Questionnaire  Ambulance transportation is medically necessary only if other means of transport are contraindicated or would be potentially harmful to the patient  To meet this requirement, the patient must either be "bed confined" or suffer from a condition such that transport by means other than ambulance is contraindicated by the patient's condition  The following questions must be answered by the medical professional signing below for this form to be valid:    1)  Describe the MEDICAL CONDITION (physical and/or mental) of this patient AT [address] [place] [address] that requires the patient to be transported in an ambulance and why transport by other means is contraindicated by the patient's condition:CVA, aphasic, metastatic cancer, fall risk, safety risk    2) Is the patient "bed confined" as defined below? Yes  To be "be confined" the patient must satisfy all three of the following conditions: (1) unable to get up from bed without Assistance; AND (2) unable to ambulate; AND (3) unable to sit in a chair or wheelchair  3) Can this patient safely be transported by car or wheelchair van (i e , seated during transport without a medical attendant or monitoring)?    No    4) In addition to completing questions 1-3 above, please check any of the following conditions that apply*:   *Note: supporting documentation for any boxes checked must be maintained in the patient's medical records  If hosp-hosp transfer, describe services needed at 2nd facility not available at 1st facility? Unable to tolerate seated position for time needed to transport   Other(specify) aphasic, fall and safety risk      Section III - Signature of Physician or Healthcare Professional  I certify that the above information is true and correct based on my evaluation of this patient, and represent that the patient requires transport by ambulance and that other forms of transport are contraindicated  I understand that this information will be used by the Centers for Medicare and Medicaid Services (CMS) to support the determination of medical necessity for ambulance services, and I represent that I have personal knowledge of the patient's condition at time of transport  []  If this box is checked, I also certify that the patient is physically or mentally incapable of signing the ambulance service's claim and that the institution with which I am affiliated has furnished care, services, or assistance to the patient  My signature below is made on behalf of the patient pursuant to 42 CFR §424 36(b)(4)  In accordance with 42 CFR §424 37, the specific reason(s) that the patient is physically or mentally incapable of signing the claim form is as follows: Pallavi Steel of Physician* or Healthcare Professional______________________________________________________________  Signature Date 02/19/21 (For scheduled repetitive transports, this form is not valid for transports performed more than 60 days after this date)    Printed Name & Credentials of Physician or Healthcare Professional (MD, DO, RN, etc )_Ame Calixto RN  *Form must be signed by patient's attending physician for scheduled, repetitive transports   For non-repetitive, unscheduled ambulance transports, if unable to obtain the signature of the attending physician, any of the following may sign (choose appropriate option below)  [] Physician Assistant []  Clinical Nurse Specialist [x]  Registered Nurse  []  Nurse Practitioner  [x] Discharge Planner

## 2021-02-20 VITALS
BODY MASS INDEX: 24.11 KG/M2 | WEIGHT: 168.43 LBS | HEIGHT: 70 IN | DIASTOLIC BLOOD PRESSURE: 90 MMHG | RESPIRATION RATE: 18 BRPM | HEART RATE: 102 BPM | TEMPERATURE: 97.8 F | OXYGEN SATURATION: 94 % | SYSTOLIC BLOOD PRESSURE: 161 MMHG

## 2021-02-20 PROCEDURE — 99239 HOSP IP/OBS DSCHRG MGMT >30: CPT | Performed by: INTERNAL MEDICINE

## 2021-02-20 NOTE — PLAN OF CARE
Problem: Prexisting or High Potential for Compromised Skin Integrity  Goal: Skin integrity is maintained or improved  Description: INTERVENTIONS:  - Identify patients at risk for skin breakdown  - Assess and monitor skin integrity  - Assess and monitor nutrition and hydration status  - Monitor labs   - Assess for incontinence   - Turn and reposition patient  - Assist with mobility/ambulation  - Relieve pressure over bony prominences  - Avoid friction and shearing  - Provide appropriate hygiene as needed including keeping skin clean and dry  - Evaluate need for skin moisturizer/barrier cream  - Collaborate with interdisciplinary team   - Patient/family teaching  - Consider wound care consult   Outcome: Progressing     Problem: PAIN - ADULT  Goal: Verbalizes/displays adequate comfort level or baseline comfort level  Description: Interventions:  - Encourage patient to monitor pain and request assistance  - Assess pain using appropriate pain scale  - Administer analgesics based on type and severity of pain and evaluate response  - Implement non-pharmacological measures as appropriate and evaluate response  - Consider cultural and social influences on pain and pain management  - Notify physician/advanced practitioner if interventions unsuccessful or patient reports new pain  Outcome: Progressing     Problem: INFECTION - ADULT  Goal: Absence or prevention of progression during hospitalization  Description: INTERVENTIONS:  - Assess and monitor for signs and symptoms of infection  - Monitor lab/diagnostic results  - Monitor all insertion sites, i e  indwelling lines, tubes, and drains  - Monitor endotracheal if appropriate and nasal secretions for changes in amount and color  - [place] appropriate cooling/warming therapies per order  - Administer medications as ordered  - Instruct and encourage patient and family to use good hand hygiene technique  - Identify and instruct in appropriate isolation precautions for identified infection/condition  Outcome: Progressing  Goal: Absence of fever/infection during neutropenic period  Description: INTERVENTIONS:  - Monitor WBC    Outcome: Progressing     Problem: SAFETY ADULT  Goal: Patient will remain free of falls  Description: INTERVENTIONS:  - Assess patient frequently for physical needs  -  Identify cognitive and physical deficits and behaviors that affect risk of falls    -  [place] fall precautions as indicated by assessment   - Educate patient/family on patient safety including physical limitations  - Instruct patient to call for assistance with activity based on assessment  - Modify environment to reduce risk of injury  - Consider OT/PT consult to assist with strengthening/mobility  Outcome: Progressing  Goal: Maintain or return to baseline ADL function  Description: INTERVENTIONS:  -  Assess patient's ability to carry out ADLs; assess patient's baseline for ADL function and identify physical deficits which impact ability to perform ADLs (bathing, care of mouth/teeth, toileting, grooming, dressing, etc )  - Assess/evaluate cause of self-care deficits   - Assess range of motion  - Assess patient's mobility; develop plan if impaired  - Assess patient's need for assistive devices and provide as appropriate  - Encourage maximum independence but intervene and supervise when necessary  - Involve family in performance of ADLs  - Assess for home care needs following discharge   - Consider OT consult to assist with ADL evaluation and planning for discharge  - Provide patient education as appropriate  Outcome: Progressing  Goal: Maintain or return mobility status to optimal level  Description: INTERVENTIONS:  - Assess patient's baseline mobility status (ambulation, transfers, stairs, etc )    - Identify cognitive and physical deficits and behaviors that affect mobility  - Identify mobility aids required to assist with transfers and/or ambulation (gait belt, sit-to-stand, lift, walker, cane, etc )  - [place] fall precautions as indicated by assessment  - Record patient progress and toleration of activity level on Mobility SBAR; progress patient to next Phase/Stage  - Instruct patient to call for assistance with activity based on assessment  - Consider rehabilitation consult to assist with strengthening/weightbearing, etc   Outcome: Progressing     Problem: DISCHARGE PLANNING  Goal: Discharge to home or other facility with appropriate resources  Description: INTERVENTIONS:  - Identify barriers to discharge w/patient and caregiver  - Arrange for needed discharge resources and transportation as appropriate  - Identify discharge learning needs (meds, wound care, etc )  - Arrange for interpretive services to assist at discharge as needed  - Refer to Case Management Department for coordinating discharge planning if the patient needs post-hospital services based on physician/advanced practitioner order or complex needs related to functional status, cognitive ability, or social support system  Outcome: Progressing     Problem: Knowledge Deficit  Goal: Patient/family/caregiver demonstrates understanding of disease process, treatment plan, medications, and discharge instructions  Description: Complete learning assessment and assess knowledge base  Interventions:  - Provide teaching at level of understanding  - Provide teaching via preferred learning methods  Outcome: Progressing     Problem: Neurological Deficit  Goal: Neurological status is stable or improving  Description: Interventions:  - Monitor and assess patient's level of consciousness, motor function, sensory function, and level of assistance needed for ADLs  - Monitor and report changes from baseline  Collaborate with interdisciplinary team to initiate plan and implement interventions as ordered  - Provide and maintain a safe environment  - Consider seizure precautions  - Consider fall precautions  - Consider aspiration precautions    - Consider bleeding precautions  Outcome: Progressing     Problem: Activity Intolerance/Impaired Mobility  Goal: Mobility/activity is maintained at optimum level for patient  Description: Interventions:  - Assess and monitor patient  barriers to mobility and need for assistive/adaptive devices  - Assess patient's emotional response to limitations  - Collaborate with interdisciplinary team and initiate plans and interventions as ordered  - Encourage independent activity per ability   - Maintain proper body alignment  - Perform active/passive rom as tolerated/ordered  - Plan activities to conserve energy   - Turn patient as appropriate  Outcome: Progressing     Problem: Communication Impairment  Goal: Ability to express needs and understand communication  Description: Assess patient's communication skills and ability to understand information  Patient will demonstrate use of effective communication techniques, alternative methods of communication and understanding even if not able to speak  - Encourage communication and provide alternate methods of communication as needed  - Collaborate with case management/ for discharge needs  - Include patient/family/caregiver in decisions related to communication  Outcome: Progressing     Problem: Potential for Aspiration  Goal: Non-ventilated patient's risk of aspiration is minimized  Description: Assess and monitor vital signs, respiratory status, and labs (WBC)  Monitor for signs of aspiration (tachypnea, cough, rales, wheezing, cyanosis, fever)  - Assess and monitor patient's ability to swallow  - Place patient up in chair to eat if possible  - HOB up at 90 degrees to eat if unable to get patient up into chair   - Supervise patient during oral intake  - Instruct patient/ family to take small bites  - Instruct patient/ family to take small single sips when taking liquids    - Follow patient-specific strategies generated by speech pathologist   Outcome: Progressing     Problem: Nutrition  Goal: Nutrition/Hydration status is improving  Description: Monitor and assess patient's nutrition/hydration status for malnutrition (ex- brittle hair, bruises, dry skin, pale skin and conjunctiva, muscle wasting, smooth red tongue, and disorientation)  Collaborate with interdisciplinary team and initiate plan and interventions as ordered  Monitor patient's weight and dietary intake as ordered or per policy  Utilize nutrition screening tool and intervene per policy  Determine patient's food preferences and provide high-protein, high-caloric foods as appropriate  - Assist patient with eating   - Allow adequate time for meals   - Encourage patient to take dietary supplement as ordered  - Collaborate with clinical nutritionist   - Include patient/family/caregiver in decisions related to nutrition  Outcome: Progressing     Problem: Potential for Falls  Goal: Patient will remain free of falls  Description: INTERVENTIONS:  - Assess patient frequently for physical needs  -  Identify cognitive and physical deficits and behaviors that affect risk of falls    -  [place] fall precautions as indicated by assessment   - Educate patient/family on patient safety including physical limitations  - Instruct patient to call for assistance with activity based on assessment  - Modify environment to reduce risk of injury  - Consider OT/PT consult to assist with strengthening/mobility  Outcome: Progressing

## 2021-02-21 NOTE — ASSESSMENT & PLAN NOTE
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

## 2021-02-21 NOTE — ASSESSMENT & PLAN NOTE
· Creatinine 3 08 2/12/21, up from 1 91 the day prior, and baseline appears to be around 1 0  · Suspected secondary to ATN, bladder CA hydroureteronephrosis, contrast induced  · Ultrasound showed mild-to-moderate bilateral hydronephrosis, worse on the left  · CT abdomen pelvis without contrast showed moderate left hydroureteronephrosis, likely due to patient's bladder cancer since there is eccentric, partially calcified left-sided urinary bladder wall thickening  There is no right-sided hydronephrosis  1 7 cm diameter lytic metastases in T12 vertebral body, liver metastasis, right lower lobe pulmonary nodules, retroperitoneal adenopathy  · S/p  L PCN 2/16  · Started on steroids  · Nephrology discussed with Reford Burkitt Dr Livia Fergusson recommended hospice and discussed that with the patient's family  Patient was made comfort care, transitioned to home hospice

## 2021-02-21 NOTE — DISCHARGE SUMMARY
Discharge- Natalie Hummel 1935, 81 y o  male MRN: [id number]    Unit/Bed#: [facility] 904-01 Encounter: [id number]    Primary Care Provider: Chico Malik MD   Date and time admitted to hospital: 2/10/2021  1:55 PM        * CVA (cerebral vascular accident) [facility])  Assessment & Plan  · Per record review, on 2/10/21, patient was noted to be expressively aphasic, last known well at noon that day, with aphasia noted right after a fall  · Did not receive tPA due to Xarelto use  · Initial CT head with microangiopathic changes, no acute intracranial abnormality  CTA head/neck with left distal M2 and M3 occlusion however no occlusion was noted per neuro surgery  Repeat  · MRI brain revealed cluster of acute to early subacute embolic infarcts in the left MCA territory, within the inferior parietal temporal region  Few scattered embolic infarcts in the frontoparietal region and right cerebellar hemisphere  Possible central/cardiac source    Patient was made level 4 comfort care  Discharged to home hospice       VELVET (acute kidney injury) [facility])  Assessment & Plan  · Creatinine 3 08 2/12/21, up from 1 91 the day prior, and baseline appears to be around 1 0  · Suspected secondary to ATN, bladder CA hydroureteronephrosis, contrast induced  · Ultrasound showed mild-to-moderate bilateral hydronephrosis, worse on the left  · CT abdomen pelvis without contrast showed moderate left hydroureteronephrosis, likely due to patient's bladder cancer since there is eccentric, partially calcified left-sided urinary bladder wall thickening  There is no right-sided hydronephrosis  1 7 cm diameter lytic metastases in T12 vertebral body, liver metastasis, right lower lobe pulmonary nodules, retroperitoneal adenopathy  · S/p  L PCN 2/16  · Started on steroids  · Nephrology discussed with Bennett Soriano recommended hospice and discussed that with the patient's family    Patient was made comfort care, transitioned to home hospice  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 [facility])  Assessment & Plan  · Patient with known urothelial carcinoma, muscle invasive, 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, liver, T12  Follows at [facility]      Pulmonary emboli [facility])  Assessment & Plan  · Known history  Patient was on Xarelto       Anemia  Assessment & Plan  Hemoglobin was stable on last labs stable  Per patient and family no more lab draws, patient is comfort care                 Resolved Problems  Date Reviewed: 2/20/2021    None          Admission Date:   Admission Orders (From admission, onward)     Ordered        02/10/21 1506  Inpatient Admission  Once                     Admitting Diagnosis: Injury, unspecified, initial encounter [T14 90XA]  Expressive aphasia [R47 01]  Aphasia determined by examination [R47 01]    HPI: Herminia Renteria is a 81 y o  male who presents with history of bladder cancer follows at [facility], has PE on xeralto presented after a fall at home  Initially a trauma admission he was cleared for fractures, head laceration  Patient was noted to have expressive aphasia on exam  He was having trouble finding words  Head CT was negative  Stroke alert was called, not a candidate for TPA  Will keep him NPO for speech evaluation  Procedures Performed:   Orders Placed This Encounter   Procedures    Fast Ultrasound       Summary of Hospital Course:  Patient admitted with expressive aphasia  Was not a candidate for tPA due to being on Xarelto  MRI showed cluster of acute early subacute embolic infarct in the left MCA territory  Patient developed acute kidney injury secondary to ATN, known metastatic bladder cancer, also imaging showed hydroureteronephrosis  Patient received L PCN without improvement of kidney function  Nephrology discussed with Ward Jackson oncologist who recommended hospice  Family agreed and patient was transitioned to home hospice  Please see detailed assessment and plan above      Significant Findings, Care, Treatment and Services Provided:   X-ray trauma 02/10/2021  IMPRESSION:  No acute traumatic injury  No active cardiopulmonary disease     CT head without contrast 02/10/2021  IMPRESSION:  No acute intracranial abnormality  Microangiopathic changes  CT cervical spine 02/10/2021  IMPRESSION:  1  No acute cervical spine fracture or traumatic malalignment  2   Multiple somewhat ill-defined biapical pulmonary nodules measuring up to 1 4 cm in the left upper lobe  While these may be postinflammatory/postinfectious in nature, underlying malignancy to be excluded  Recommend dedicated CT of the entire chest     Tube useful to compare to any prior studies the patient may have had  See above  CTA head and neck 02/10/2021  IMPRESSION:  Left distal M2/and M3 occlusion  Multiple apical pulmonary nodules largest measuring 1 3 cm concerning for possible neoplasm  Based on current Fleischner Society 2017 Guidelines on incidental pulmonary nodule, follow-up nonemergent outpatient enhanced chest CT and/or PET/CT recommended   for further evaluation  These could be neoplastic/metastatic, correlate clinically       MRI of the brain 02/11/2021  IMPRESSION:  1  Cluster of acute to early subacute embolic infarcts in the left MCA territory, within the inferior parietal temporal region  Few scattered embolic infarcts in the right frontoparietal region and right cerebellar hemisphere  Possible central/cardiac   source of emboli  2   No evidence of CNS metastases    Kidney ultrasound 02/15/2021  IMPRESSION:  1  Mild to moderate bilateral hydronephrosis, worse on the left  Recommend follow-up with CT of the abdomen and pelvis to assess for any obstructing cause  2   Spangler catheter decompresses the bladder limiting evaluation  3   Incidentally noted is a left pleural effusion      CT abdomen pelvis without contrast 02/15/2021  IMPRESSION:  There is moderate left hydroureteronephrosis, likely due to patient's bladder cancer, since there is eccentric, partially calcified left sided urinary bladder wall thickening (series 2 images 70-76 )  There is no right-sided hydronephrosis  There is a nonobstructing small right renal upper pole calyceal stone  There is a 1 7 cm diameter lytic metastasis in T12 vertebral body (series 2 image 16 and series 602 image 96 )  There are also liver metastases, right lower lobe pulmonary nodules that are likely metastases, and retroperitoneal adenopathy as described above      Complications: none    Condition at Discharge: poor         Discharge instructions/Information to patient and family:   See after visit summary for information provided to patient and family  Provisions for Follow-Up Care:  See after visit summary for information related to follow-up care and any pertinent home health orders  PCP: Steven Yoder MD    Disposition: Home with home hospice    Planned Readmission: No    Discharge Statement   I spent 45 minutes discharging the patient  This time was spent on the day of discharge  I had direct contact with the patient on the day of discharge  Additional documentation is required if more than 30 minutes were spent on discharge  Discharge Medications:  See after visit summary for reconciled discharge medications provided to patient and family

## 2021-02-21 NOTE — ASSESSMENT & PLAN NOTE
· Per record review, on 2/10/21, patient was noted to be expressively aphasic, last known well at noon that day, with aphasia noted right after a fall  · Did not receive tPA due to Xarelto use  · Initial CT head with microangiopathic changes, no acute intracranial abnormality  CTA head/neck with left distal M2 and M3 occlusion however no occlusion was noted per neuro surgery  Repeat  · MRI brain revealed cluster of acute to early subacute embolic infarcts in the left MCA territory, within the inferior parietal temporal region  Few scattered embolic infarcts in the frontoparietal region and right cerebellar hemisphere  Possible central/cardiac source    Patient was made level 4 comfort care    Discharged to home hospice

## 2021-02-22 ENCOUNTER — TELEPHONE (OUTPATIENT)
Dept: NEUROLOGY | Facility: CLINIC | Age: 86
End: 2021-02-22

## 2021-02-22 NOTE — TELEPHONE ENCOUNTER
Reviewed patient chart, appears he was discharged to home with hospice care services  No appointment to be scheduled at this time  No follow up

## 2021-02-24 DIAGNOSIS — R52 PAIN: Primary | ICD-10-CM

## 2021-02-24 RX ORDER — OXYCODONE HYDROCHLORIDE 5 MG/1
7.5 TABLET ORAL EVERY 4 HOURS
Qty: 20 TABLET | Refills: 0 | Status: SHIPPED | OUTPATIENT
Start: 2021-02-24

## 2021-02-26 DIAGNOSIS — C67.9: Primary | ICD-10-CM

## 2021-02-26 DIAGNOSIS — C78.00: ICD-10-CM

## 2021-02-26 DIAGNOSIS — Z51.5 HOSPICE CARE PATIENT: ICD-10-CM

## 2021-02-26 DIAGNOSIS — C78.00: Primary | ICD-10-CM

## 2021-02-26 DIAGNOSIS — C67.9: ICD-10-CM

## 2021-02-26 RX ORDER — LORAZEPAM 2 MG/ML
1 CONCENTRATE ORAL EVERY 6 HOURS SCHEDULED
Qty: 30 ML | Refills: 0 | Status: SHIPPED | OUTPATIENT
Start: 2021-02-26

## 2021-02-26 RX ORDER — MORPHINE SULFATE 100 MG/5ML
10 SOLUTION, CONCENTRATE ORAL EVERY 6 HOURS SCHEDULED
Qty: 30 ML | Refills: 0 | Status: SHIPPED | OUTPATIENT
Start: 2021-02-26 | End: 2021-02-26 | Stop reason: SDUPTHER

## 2021-02-26 RX ORDER — MORPHINE SULFATE 100 MG/5ML
10 SOLUTION, CONCENTRATE ORAL EVERY 6 HOURS SCHEDULED
Qty: 30 ML | Refills: 0 | Status: SHIPPED | OUTPATIENT
Start: 2021-02-26

## 2021-02-26 NOTE — PROGRESS NOTES
2/26/2021 12:58 PM  [facility] patient requests refill of CII medication  Filled electronically via Epic as per [place] State Law  Requested Prescriptions     Signed Prescriptions Disp Refills    Morphine Sulfate, Concentrate, 20 mg/mL concentrated solution 30 mL 0     Sig: Take 0 5 mL (10 mg total) by mouth every 6 (six) hours And every one hour as needed for pain or shortness of breathMax Daily Amount: 40 mg    LORazepam (ATIVAN) 2 mg/mL concentrated solution 30 mL 0     Sig: Take 0 5 mL (1 mg total) by mouth every 6 (six) hours And every 1 hour as needed for anxiety/restlessness/ or shortness of breath       Cynthia Rojas 78 Answering Service: [phone]  You can find me on [employer]!

## 2021-09-30 NOTE — CONSULTS
Neurology Consultation    Assessment/Plan:  80-year-old male seen in consultation for fallen Xarelto however noted to be expressively aphasic  After speaking further with family, they state last known well at noon with aphasia noted right after fall    - No tPA due to xarelto use- last taken yesterday  - CTA H/N with distal M2/M3 occlusion- discussed with Dr Mandujano- no occlusion noted by him  - Continue xarelto for now, if stroke noted on MRI-- would switch  Consider lovenox injections as concern for hypercoagulable state in the setting of bladder carcinoma  - Recommend MRI brain w/w/o contrast to make sure no metastases    Therapies  Permissive HTN upto 220/110  NPO until swallow screen  Will follow up    Discussed above with [facility] attending glenn    HPI- consult  Stroke alert not called as patient intially came in as trauma alert  Meryl Mcadams is a 81 y o  male bladder cancer-with metastatic, receiving infusions follows with [facility] Brochure, history DVT/PE on Xarelto seen in consultation for aphasia  Per surgery resident who spoke with family last known well Sunday and family has noted patient to have intermittent trouble speaking since then  Patient was brought here today as wife called EMS due to fall on Xarelto  CT head with no acute changes  Patient states he has been compliant with Xarelto, did not take it today but took it yesterday    However when I spoke with wife she states patient was slower but no speech issues noted by herself or [facility] where he gets IV infusion of Alalizumab (three doses thus far), no aphasia noted by doctors there per wife  Last known well noon since fall    Patient tells me that his speech is off and he has noticed this more today  He denies any focal weakness  He denies prior history of stroke  He states he has been taking his medication    He is able to communicate however Patient : Ander Rodgers Age: 65 year old Sex: male   MRN: [id number] Encounter Date: 9/29/2021      History     Chief Complaint   Patient presents with   • Urinary Retention   •  Symptoms   • Hematuria     HPI    Patient declines .     65 year old male with history of DM on metformin presents to the ED for evaluation of urinating blood and dysuria that started about three hours ago. He states urine is red with small dark blood clots. He denies any history of same. He reports some bilateral lower abdominal pain, denies any flank pain or back pain. He feels he is emptying his bladder. No fever, chills, nausea, or vomiting.     He denies history of prostate problems, bladder infection, or kidney stones.       No Known Allergies    Discharge Medication List as of 9/30/2021  1:44 AM      Prior to Admission Medications    Details   metFORMIN (GLUCOPHAGE-XR) 500 MG 24 hr tablet Take 2 tablets with breakfast and 2 tablets with dinner.Eprescribe, Disp-360 tablet, R-1      omeprazole (PriLOSEC) 40 MG capsule TAKE 1 CAPSULE BY MOUTH DAILYEprescribe, Disp-90 capsule, R-3      dulaglutide (Trulicity) 0.75 MG/0.5ML pen-injector Inject 1.5 mg into the skin every 7 days.Eprescribe, Disp-6 mL, R-3Discontinue Ozempic.      insulin glargine (Lantus SoloStar) 100 UNIT/ML pen-injector Inject 36 Units into the skin daily. Prime 2 units before each dose.Eprescribe, Disp-30 mL, R-1      blood glucose meter Test blood sugar 4 times daily. Diagnosis: E11.65. Meter: per insurance coverageDisp-1 kit, R-0, Eprescribe      blood glucose (FREESTYLE TEST STRIPS) test strip Test blood sugar 4 times daily. Diagnosis: E11.65. Meter: per insurance coverageDisp-300 each, R-3, Eprescribe      SOFTCLIX LANCETS Misc Test blood sugar 4 times daily. Diagnosis: E11.65. Meter: per insurance coverageDisp-300 each, R-3, Eprescribe      Insulin Lispro, 1 Unit Dial, (HumaLOG KwikPen) 100 UNIT/ML pen-injector Inject 6 Units into the skin 3 times  daily (before meals). Prime 2 units before each dose.Eprescribe, Subcutaneous, 3 TIMES DAILY BEFORE MEALS, Disp-30 mL, R-1**Patient requests 90 days supply**Prime 2 units before each dose. This is for insulin pen.      aspirin 81 MG EC tablet Take 1 tablet by mouth daily.OTC      rosuvastatin (CRESTOR) 40 MG tablet TAKE 1 TABLET BY MOUTH DAILYEprescribe, Disp-90 tablet,R-3**Patient requests 90 days supply**      lisinopril-hydroCHLOROthiazide (ZESTORETIC) 20-12.5 MG per tablet TAKE 2 TABLETS BY MOUTH DAILYEprescribe, Disp-180 tablet,R-3      gabapentin (NEURONTIN) 100 MG capsule Take 1 capsule by mouth 3 times daily.Eprescribe, Disp-30 capsule,R-0      Insulin Pen Needle 32G X 6 MM Misc Use to inject insulin 5 times daily. Remove needle cover(s) to expose needle before injecting.Disp-500 each,R-2, Eprescribe         New Prescriptions    Details   sulfamethoxazole-trimethoprim (Bactrim DS) 800-160 MG per tablet Take 1 tablet by mouth 2 times daily.Eprescribe, Disp-20 tablet, R-0             Past Medical History:   Diagnosis Date   • Arthritis    • Chronic pain     shoulders   • Essential (primary) hypertension    • Fracture 10/10/2019    slipped on wet rainly pavement - fx left ankle   • Other hyperlipidemia 10/12/2018   • Sebaceous cyst    • Type 2 diabetes mellitus without complication, with long-term current use of insulin (CMS/[facility]) 10/12/2018       Past Surgical History:   Procedure Laterality Date   • ANKLE SURGERY Left 10/17/2019    left ankle bimalleolar fracture ORIF, syndesmosis ORIF by Dr. Camacho at [facility]   • ANKLE SURGERY Left 07/14/2020    Left medial and lateral ankle screw removal, release of posterior tibial tendon from flexor retinaculum by Dr. Camacho at [address] [place] [address]   • FRACTURE SURGERY Right age 30s    Fx arm after MVI   • SOFT TISSUE CYST EXCISION  02/04/2019    excision of 4 cysts on back       Family History   Problem Relation Age of Onset   • Asthma Mother    • Diabetes Maternal  difficulty due to mild-to-moderate expressive aphasia  Denies history seizures  Ten point review of systems completed than that noted above  NIHSS 2 for mild to moderate expressive aphasia and does not know month  He is able to understand/follow commands    Physical Exam    General: AO x person place not time and somewhat situation, AD  HEENT: NCAT, PERRL, No septal deviation  CVS: RRR  Respiratory: Symmetric chest rise  No respiratory distress noted  Extremities: No edema noted  MSK: Normal ROM  Neurologic exam:  Mental Status: Alert and oriented to person, place, time and situation  Mild-to-moderate expressive aphasia, no dysarthria  Impaired naming with some objects and repetition impaired  Comprehension intact with 1 and 2 step commands, right left confusion noted    Judgement intact  Cranial Nerves:PERRL  EOM intact  Sensation intact V1-3 b/l  No facial asymmetry noted  Hearing intact to conversation  No uvula or tongue deviation noted  SCM and Trapezius strength bilaterally intact  Motor Exam: B/l UE and LE intact strength  No pronator drift noted  Reflexes 1/4 diffusely  No Babinski and no Barba's or Tromner's noted  Sensory Exam: Intact sensation to light touch bilaterally with no extinction to DSS  Coordination: No dysmetria to FNF or HS testing  COLEMAN intact- no dysdiadochokinesia     Gait deferred due to fall risk      Imaging Studies: I have personally reviewed pertinent films in PACS Grandmother    • Diabetes Maternal Grandfather    • Diabetes Father        Social History     Tobacco Use   • Smoking status: Former Smoker     Packs/day: 0.00     Types: Cigarettes   • Smokeless tobacco: Never Used   • Tobacco comment: quit 2005   Substance Use Topics   • Alcohol use: No   • Drug use: No       E-cigarette/Vaping     E-Cigarette/Vaping Substances & Devices       Review of Systems   Constitutional: Negative for fever.   HENT: Negative for trouble swallowing.    Respiratory: Negative for cough and shortness of breath.    Cardiovascular: Negative for chest pain.   Gastrointestinal: Positive for abdominal pain. Negative for diarrhea and vomiting.   Genitourinary: Positive for dysuria and hematuria. Negative for decreased urine volume, flank pain and testicular pain.   Musculoskeletal: Negative for back pain.   Skin: Negative for rash.   Neurological: Negative for syncope.   Psychiatric/Behavioral: Negative for behavioral problems.       Physical Exam     ED Triage Vitals [09/29/21 2225]   ED Triage Vitals Group      Temp 99 °F (37.2 °C)      Heart Rate 68      Resp 16      BP (!) 156/92      SpO2 98 %      EtCO2 mmHg       Height       Weight       Weight Scale Used       BMI (Calculated)       IBW/kg (Calculated)        Physical Exam  Vitals and nursing note reviewed.   Constitutional:       General: He is not in acute distress.     Appearance: He is well-developed. He is not ill-appearing or diaphoretic.   HENT:      Head: Normocephalic and atraumatic.   Eyes:      Conjunctiva/sclera: Conjunctivae normal.   Cardiovascular:      Rate and Rhythm: Normal rate and regular rhythm.   Pulmonary:      Effort: Pulmonary effort is normal. No respiratory distress.   Abdominal:      Palpations: Abdomen is soft.      Tenderness: There is no abdominal tenderness. There is no right CVA tenderness or left CVA tenderness.   Genitourinary:     Comments: No bleeding, no testicular swelling   Musculoskeletal:       Cervical back: Normal range of motion.   Skin:     General: Skin is warm and dry.   Neurological:      Mental Status: He is alert and oriented to person, place, and time.      GCS: GCS eye subscore is 4. GCS verbal subscore is 5. GCS motor subscore is 6.   Psychiatric:         Behavior: Behavior normal.         ED Course     Procedures    Lab Results     Results for orders placed or performed during the hospital encounter of 09/29/21   Urinalysis & Reflex Microscopy With Culture If Indicated   Result Value Ref Range    COLOR, URINALYSIS Red     APPEARANCE, URINALYSIS Cloudy     GLUCOSE, URINALYSIS Negative Negative mg/dL    BILIRUBIN, URINALYSIS Negative Negative    KETONES, URINALYSIS Negative Negative mg/dL    SPECIFIC GRAVITY, URINALYSIS 1.020 1.005 - 1.030    OCCULT BLOOD, URINALYSIS Large (A) Negative    PH, URINALYSIS 6.0 5.0 - 7.0    PROTEIN, URINALYSIS 30  (A) Negative mg/dL    UROBILINOGEN, URINALYSIS 0.2 0.2, 1.0 mg/dL    NITRITE, URINALYSIS Positive (A) Negative    LEUKOCYTE ESTERASE, URINALYSIS Large (A) Negative    SQUAMOUS EPITHELIAL, URINALYSIS None Seen None Seen, 1 to 5 /hpf    ERYTHROCYTES, URINALYSIS >100 (A) None Seen, 1 to 2 /hpf    LEUKOCYTES, URINALYSIS 26 to 100 (A) None Seen, 1 to 5 /hpf    BACTERIA, URINALYSIS None Seen None Seen /hpf    HYALINE CASTS, URINALYSIS None Seen None Seen, 1 to 5 /lpf   Comprehensive Metabolic Panel   Result Value Ref Range    Fasting Status      Sodium 140 135 - 145 mmol/L    Potassium 3.6 3.4 - 5.1 mmol/L    Chloride 102 98 - 107 mmol/L    Carbon Dioxide 35 (H) 21 - 32 mmol/L    Anion Gap 7 (L) 10 - 20 mmol/L    Glucose 126 (H) 70 - 99 mg/dL    BUN 13 6 - 20 mg/dL    Creatinine 1.17 0.67 - 1.17 mg/dL    Glomerular Filtration Rate 65 >=60    BUN/ Creatinine Ratio 11 7 - 25    Calcium 9.0 8.4 - 10.2 mg/dL    Bilirubin, Total 0.3 0.2 - 1.0 mg/dL    GOT/AST 20 <=37 Units/L    GPT/ALT 23 <64 Units/L    Alkaline Phosphatase 72 45 - 117 Units/L    Albumin 3.9 3.6 -  5.1 g/dL    Protein, Total 7.8 6.4 - 8.2 g/dL    Globulin 3.9 2.0 - 4.0 g/dL    A/G Ratio 1.0 1.0 - 2.4   CBC with Automated Differential (performable only)   Result Value Ref Range    WBC 12.4 (H) 4.2 - 11.0 K/mcL    RBC 4.65 4.50 - 5.90 mil/mcL    HGB 14.0 13.0 - 17.0 g/dL    HCT 41.8 39.0 - 51.0 %    MCV 89.9 78.0 - 100.0 fl    MCH 30.1 26.0 - 34.0 pg    MCHC 33.5 32.0 - 36.5 g/dL    RDW-CV 13.2 11.0 - 15.0 %    RDW-SD 43.2 39.0 - 50.0 fL     140 - 450 K/mcL    NRBC 0 <=0 /100 WBC    Neutrophil, Percent 58 %    Lymphocytes, Percent 31 %    Mono, Percent 7 %    Eosinophils, Percent 4 %    Basophils, Percent 0 %    Immature Granulocytes 0 %    Absolute Neutrophils 7.1 1.8 - 7.7 K/mcL    Absolute Lymphocytes 3.9 1.0 - 4.0 K/mcL    Absolute Monocytes 0.9 0.3 - 0.9 K/mcL    Absolute Eosinophils  0.5 0.0 - 0.5 K/mcL    Absolute Basophils 0.0 0.0 - 0.3 K/mcL    Absolute Immmature Granulocytes 0.0 0.0 - 0.2 K/mcL   ISTAT8 VENOUS  POINT OF CARE   Result Value Ref Range    BUN - POINT OF CARE 13 6 - 20 mg/dL    SODIUM - POINT OF CARE 140 135 - 145 mmol/L    POTASSIUM - POINT OF CARE 3.4 3.4 - 5.1 mmol/L    CHLORIDE - POINT OF CARE 98 98 - 107 mmol/L    TCO2 - POINT OF CARE 29 (H) 19 - 24 mmol/L    ANION GAP - POINT OF CARE 19 10 - 20 mmol/L    HEMATOCRIT - POINT OF CARE 44.0 39.0 - 51.0 %    HEMOGLOBIN - POINT OF CARE 15.0 13.0 - 17.0 g/dL    GLUCOSE - POINT OF CARE 127 (H) 70 - 99 mg/dL    CALCIUM, IONIZED - POINT OF CARE 1.13 (L) 1.15 - 1.29 mmol/L    Creatinine 1.20 (H) 0.67 - 1.17 mg/dL    Glomerular Filtration Rate 64 >=60         Radiology Results     Imaging Results          CT ABDOMEN PELVIS FOR KIDNEY STONES (Final result)  Result time 09/30/21 00:52:17    Final result                 Impression:    IMPRESSION:    1.  Diffuse thickening of the bladder wall with adjacent subtle soft tissue  stranding suggesting cystitis. Correlation with urinalysis is suggested.  There is no focal bladder mass or stone.  2.   No nephrolithiasis or ureterolithiasis. There is no hydronephrosis.  3.  Moderate uncomplicated colonic diverticulosis.    I, Attending Radiologist Jessica Maria MD, have reviewed the images  and report and concur with these findings interpreted by Resident  Radiologist, Concetta Dennis MD.              Narrative:    CT ABDOMEN PELVIS FOR KIDNEY STONES WO CONTRAST    HISTORY: 65-year-old male with history of diabetes presenting with dysuria  and hematuria.    COMPARISON: CT angiogram chest 12/29/2020.     TECHNIQUE:  Axial CT images of the abdomen and pelvis without contrast.  Multiplanar reformats.    FINDINGS:    Examination of the visceral organs and vascular structures is limited by  the lack of IV contrast.    LOWER CHEST:    Lung bases:  Minimal right basilar atelectasis. Right basilar pneumatocele,  unchanged. Interval resolution of groundglass opacities within the  partially visualized lungs.     Heart:  Heart size is normal. No pericardial effusion.    ABDOMEN:    Liver:  Normal parenchymal attenuation. No focal hepatic lesion. No  intrahepatic biliary dilatation.    Biliary system:  No intraluminal stones. Gallbladder is contracted. No  pericholecystic fluid or fat stranding. Common bile duct is of normal  caliber.    Spleen:  Normal size and attenuation.    Pancreas:  Normal parenchymal attenuation. No peripancreatic fluid or  stranding. No pancreatic ductal dilatation.    Adrenal glands:  Unremarkable.    Kidneys:  Bilateral extrarenal pelvises, right greater than left. No  hydronephrosis or hydroureter. No nephrolithiasis or ureterolithiasis. No  perinephric fat stranding or fluid collections.    Bowel: Distal esophagus is unremarkable. Stomach is contracted with  ingested material. Small bowel loops are of normal caliber without evidence  of obstruction. Moderate colonic diverticulosis without adjacent  inflammation to suggest diverticulitis. Normal appendix.    Retroperitoneum/mesentery:  No  abdominal ascites. No retroperitoneal  lymphadenopathy. Left lower quadrant 13 mm peripherally calcified  structure, possibly representing calcified lymph node.    Vascular:  Abdominal aorta is of normal caliber with mild atherosclerotic  calcification.    Osseous structures/subcutaneous tissues:  Small fat-containing umbilical  hernia. Degenerative changes of both hips and of the pubic symphysis.  Degenerative changes of both sacroiliac joints. Moderate multilevel  degenerative changes throughout the partially visualized thoracic spine.  Vertebral body heights are preserved.    PELVIS:     Diffuse circumferential thickening of the bladder wall is identified with  adjacent subtle soft tissue stranding. Findings suggest cystitis. No  intraluminal stones or layering fluid. The prostate is unremarkable.                    Preliminary result                 Impression:      1.  Thickening of the urinary bladder wall, at least in part due to  incomplete distention with superimposed cystitis a consideration.  2.  No nephrolithiasis or ureterolithiasis.  3.  Moderate uncomplicated colonic diverticulosis.               Narrative:    CT ABDOMEN PELVIS FOR KIDNEY STONES WO CONTRAST    HISTORY: 65-year-old male with history of diabetes presenting with dysuria  and hematuria.    COMPARISON: CT angiogram chest 12/29/2020.     TECHNIQUE:  Axial CT images of the abdomen and pelvis without contrast.  Multiplanar reformats.    FINDINGS:    Examination of the visceral organs and vascular structures is limited by  the lack of IV contrast.    LOWER CHEST:    Lung bases:  Minimal right basilar atelectasis. Right basilar   pneumatocele,  unchanged. Interval resolution of groundglass opacities within the  partially visualized lungs.     Heart:  Heart size is normal. No pericardial effusion.    ABDOMEN:    Liver:  Normal parenchymal attenuation. No focal hepatic lesion. No  intrahepatic biliary dilatation.    Biliary system:  No  intraluminal stones. Gallbladder is contracted. No  pericholecystic fluid or fat stranding. Common bile duct is of normal  caliber.    Spleen:  Normal size and attenuation.    Pancreas:  Normal parenchymal attenuation. No peripancreatic fluid or  stranding. No pancreatic ductal dilatation.    Adrenal glands:  Unremarkable.    Kidneys:  Bilateral extrarenal pelvises, right greater than left. No  hydronephrosis or hydroureter. No nephrolithiasis or ureterolithiasis. No  perinephric fat stranding or fluid collections.    Bowel: Distal esophagus is unremarkable. Stomach is contracted with  ingested material. Small bowel loops are of normal caliber without   evidence  of obstruction. Moderate colonic diverticulosis without adjacent  inflammation to suggest diverticulitis. Normal appendix.    Retroperitoneum/mesentery:  No abdominal ascites. No retroperitoneal  lymphadenopathy. Left lower quadrant 13 mm peripherally calcified  structure, possibly representing calcified lymph node.    Vascular:  Abdominal aorta is of normal caliber with mild atherosclerotic  calcification.    Osseous structures/subcutaneous tissues:  Small fat-containing umbilical  hernia. Degenerative changes of both hips and of the pubic symphysis.  Degenerative changes of both sacroiliac joints. Moderate multilevel  degenerative changes throughout the partially visualized thoracic spine.  Vertebral body heights are preserved.    PELVIS:     Thickening of the urinary bladder wall, at least in part due to incomplete  distention. No intraluminal stones or layering fluid. The prostate is  unremarkable.                                  ED Medication Orders (From admission, onward)    Ordered Start     Status Ordering Provider    09/30/21 0135 09/30/21 0136  sulfamethoxazole-trimethoprim (BACTRIM DS) 800-160 MG tablet 1 tablet  ONCE         Last MAR action: Given OPAL GARCIA    09/29/21 3394 09/29/21 2786  HYDROcodone-acetaminophen (NORCO) 5-325 MG per  tablet 1 tablet  ONCE         Last MAR action: Given OPAL GARCIA             Vitals:    09/29/21 2340 09/30/21 0000 09/30/21 0100 09/30/21 0150   BP: (!) 173/102 136/83 135/77 135/72   BP Location: RUE - Right upper extremity RUE - Right upper extremity RUE - Right upper extremity RUE - Right upper extremity   Patient Position: Semi-Weller's Semi-Weller's Semi-Weller's Semi-Weller's   Pulse: 67 63 69 72   Resp: 16 16 16 14   Temp:       TempSrc:       SpO2: 99% 98% 96% 98%       ED Course:    Post void residual bladder scan 41ml    After evaluation, I discussed plan for urine testing, labs, and CT scan to assess for kidney stones. He agrees with plan.     1:30AM Pt recheck: He is lying comfortably in bed, denies any pain at this time. I discussed results of urine testing and CT scan showing thickening of bladder wall, no signs of kidney infection or kidney stone. He is feeling well and would like to return home. I discussed plan for further care at home with antibiotics. I discussed that if bleeding does not improve with antibiotics, he should follow-up with urology. He should return to the ED immediately with any new or worsening symptoms, including inability to urinate, fever, chills, or vomiting antibiotics. I did discuss he will be contacted if antibiotics need to be changed. He feels comfortable returning home, verbalized understanding of return precautions, all questions answered.     MDM:  Pt with hematuria likely due to cystitis. Patient is not retaining urine, no systemic symptoms. He has no stone or pyelonephritis on CT scan. Prostate appears normal on CT scan. He is tolerating PO and stable for outpatient care with oral anbiotics. Strict ED return precautions discussed.       Critical Care time spent on this patient outside of billable procedures:  None    Clinical Impression:  ED Diagnosis        Final diagnosis    Hemorrhagic cystitis                The patient was provided with a recommendation to  follow up with a primary care provider and obtain reassessment of his/her blood pressure within three months.    Follow Up:  Guy Blake MD  [address]  [address]  [facility] [id number] [phone]          [facility]  [address] [facility] [address]  [phone]  Go to   If symptoms worsen, if unable to urinate, flank pain, fever, or vomiting       Discharge Medication List as of 9/30/2021  1:44 AM      START taking these medications    Details   sulfamethoxazole-trimethoprim (Bactrim DS) 800-160 MG per tablet Take 1 tablet by mouth 2 times daily.Eprescribe, Disp-20 tablet, R-0             Pt is discharged in stable condition.                    Bethany Johansen PA-C  09/30/21 0340       Rachel Parikh MD  09/30/21 0346

## 2022-01-10 NOTE — ASSESSMENT & PLAN NOTE
Patient was noted to have PE and started on xeralto  Would hold until neurology clears Should take eliquis as ordered

## (undated) DEVICE — SURGICAL GOWN, XL SMARTSLEEVE: Brand: CONVERTORS

## (undated) DEVICE — GAUZE SPONGES,16 PLY: Brand: CURITY

## (undated) DEVICE — SPONGE STICK WITH PVP-I: Brand: KENDALL

## (undated) DEVICE — LUBRICANT SURGILUBE TUBE 4 OZ  FLIP TOP

## (undated) DEVICE — SPECIMEN CONTAINER STERILE PEEL PACK

## (undated) DEVICE — CATH URETERAL 5FR X 70 CM FLEX TIP POLYUR BARD

## (undated) DEVICE — TELFA NON-ADHERENT ABSORBENT DRESSING: Brand: TELFA

## (undated) DEVICE — CYSTOSCOPY PACK: Brand: CONVERTORS

## (undated) DEVICE — SKIN MARKER DUAL TIP WITH RULER CAP, FLEXIBLE RULER AND LABELS: Brand: DEVON

## (undated) DEVICE — CYSTO TUBING TUR Y IRRIGATION

## (undated) DEVICE — SYRINGE 10ML LL

## (undated) DEVICE — STANDARD SURGICAL GOWN, L: Brand: CONVERTORS

## (undated) DEVICE — 3M™ TEGADERM™ TRANSPARENT FILM DRESSING FRAME STYLE, 1624W, 2-3/8 IN X 2-3/4 IN (6 CM X 7 CM), 100/CT 4CT/CASE: Brand: 3M™ TEGADERM™

## (undated) DEVICE — GLOVE SRG BIOGEL ECLIPSE 7.5

## (undated) DEVICE — UROCATCH BAG

## (undated) DEVICE — PAD GROUNDING ADULT

## (undated) DEVICE — SCD SEQUENTIAL COMPRESSION COMFORT SLEEVE MEDIUM KNEE LENGTH: Brand: KENDALL SCD

## (undated) DEVICE — NEEDLE 18 G X 1 1/2